# Patient Record
Sex: FEMALE | Race: WHITE | HISPANIC OR LATINO | Employment: OTHER | URBAN - METROPOLITAN AREA
[De-identification: names, ages, dates, MRNs, and addresses within clinical notes are randomized per-mention and may not be internally consistent; named-entity substitution may affect disease eponyms.]

---

## 2017-02-03 ENCOUNTER — HISTORICAL (OUTPATIENT)
Dept: ADMINISTRATIVE | Facility: HOSPITAL | Age: 47
End: 2017-02-03

## 2017-05-02 VITALS
WEIGHT: 176 LBS | BODY MASS INDEX: 26.67 KG/M2 | SYSTOLIC BLOOD PRESSURE: 112 MMHG | HEIGHT: 68 IN | DIASTOLIC BLOOD PRESSURE: 70 MMHG | HEART RATE: 87 BPM

## 2017-05-02 RX ORDER — LEVOTHYROXINE SODIUM 100 UG/1
100 CAPSULE ORAL DAILY
COMMUNITY

## 2017-05-02 RX ORDER — MORPHINE SULFATE 15 MG/1
15 TABLET ORAL EVERY 4 HOURS PRN
COMMUNITY
End: 2018-10-30 | Stop reason: ALTCHOICE

## 2017-05-02 RX ORDER — FENOFIBRATE 160 MG/1
160 TABLET ORAL DAILY
COMMUNITY
End: 2018-05-22

## 2017-05-02 RX ORDER — MELOXICAM 7.5 MG/1
7.5 TABLET ORAL 2 TIMES DAILY
COMMUNITY
End: 2023-08-23

## 2017-05-02 RX ORDER — BACLOFEN 10 MG/1
10 TABLET ORAL 2 TIMES DAILY
COMMUNITY
End: 2019-05-07 | Stop reason: DRUGHIGH

## 2017-05-02 RX ORDER — CLONAZEPAM 1 MG/1
1 TABLET ORAL DAILY
COMMUNITY
End: 2019-04-02

## 2017-05-02 RX ORDER — QUETIAPINE FUMARATE 50 MG/1
100 TABLET, FILM COATED ORAL 2 TIMES DAILY
COMMUNITY
End: 2019-05-07 | Stop reason: DRUGHIGH

## 2017-05-12 PROBLEM — R73.03 BORDERLINE DIABETES MELLITUS: Status: ACTIVE | Noted: 2017-05-12

## 2017-05-12 PROBLEM — G89.4 CHRONIC PAIN ASSOCIATED WITH SIGNIFICANT PSYCHOSOCIAL DYSFUNCTION: Status: ACTIVE | Noted: 2017-05-12

## 2017-05-12 PROBLEM — J44.9 CHRONIC OBSTRUCTIVE PULMONARY DISEASE: Status: ACTIVE | Noted: 2017-05-12

## 2017-05-12 PROBLEM — Z98.890 HISTORY OF MAJOR ABDOMINAL SURGERY: Status: ACTIVE | Noted: 2017-05-12

## 2017-05-12 PROBLEM — M81.6 LOCALIZED OSTEOPOROSIS WITHOUT CURRENT PATHOLOGICAL FRACTURE: Status: ACTIVE | Noted: 2017-05-12

## 2017-05-12 PROBLEM — M53.9 DORSOPATHY: Status: ACTIVE | Noted: 2017-05-12

## 2017-05-12 PROBLEM — Z90.81 HISTORY OF SPLENECTOMY: Status: ACTIVE | Noted: 2017-05-12

## 2017-05-12 PROBLEM — J18.9 RECURRENT PNEUMONIA: Status: ACTIVE | Noted: 2017-05-12

## 2017-05-17 ENCOUNTER — OFFICE VISIT (OUTPATIENT)
Dept: PULMONOLOGY | Facility: CLINIC | Age: 47
End: 2017-05-17
Payer: MEDICAID

## 2017-05-17 VITALS
SYSTOLIC BLOOD PRESSURE: 118 MMHG | BODY MASS INDEX: 26.22 KG/M2 | OXYGEN SATURATION: 98 % | WEIGHT: 173 LBS | HEIGHT: 68 IN | HEART RATE: 112 BPM | DIASTOLIC BLOOD PRESSURE: 80 MMHG

## 2017-05-17 DIAGNOSIS — J43.9 PULMONARY EMPHYSEMA, UNSPECIFIED EMPHYSEMA TYPE: Primary | ICD-10-CM

## 2017-05-17 DIAGNOSIS — Z87.891 PERSONAL HISTORY OF TOBACCO USE, PRESENTING HAZARDS TO HEALTH: ICD-10-CM

## 2017-05-17 DIAGNOSIS — J18.9 RECURRENT PNEUMONIA: ICD-10-CM

## 2017-05-17 PROCEDURE — 99214 OFFICE O/P EST MOD 30 MIN: CPT | Mod: 25,,, | Performed by: INTERNAL MEDICINE

## 2017-05-17 PROCEDURE — 99406 BEHAV CHNG SMOKING 3-10 MIN: CPT | Mod: ,,, | Performed by: INTERNAL MEDICINE

## 2017-05-17 RX ORDER — ALBUTEROL SULFATE 90 UG/1
2 AEROSOL, METERED RESPIRATORY (INHALATION) EVERY 6 HOURS PRN
Qty: 18 G | Refills: 5 | Status: SHIPPED | OUTPATIENT
Start: 2017-05-17 | End: 2019-11-07

## 2017-05-17 RX ORDER — HYDROCODONE BITARTRATE AND ACETAMINOPHEN 10; 325 MG/1; MG/1
TABLET ORAL
Refills: 0 | COMMUNITY
Start: 2017-04-20 | End: 2018-10-30 | Stop reason: ALTCHOICE

## 2017-05-17 RX ORDER — SUMATRIPTAN SUCCINATE 100 MG/1
TABLET ORAL
Refills: 3 | COMMUNITY
Start: 2017-04-12 | End: 2019-05-07

## 2017-05-17 NOTE — PROGRESS NOTES
"Subjective:       Patient ID: Araceli Ibarra is a 46 y.o. female.    Chief Complaint: COPD (COPD follow up)    HPI Comments: Here for follow up, overall doing much better with ANORO.  Did get some chest wall injury at a fair and has some chest discomfort as a result.  She continues to work on stopping smoking. No other new complaints    COPD    GOLD A    Last PFT - 2/10/17  FEV1- 90 % DLCO - 59 %     + LABA/LAMA    + prn ALBUTEROL    mMRC -  0 - SOB with strenuous exercise   - + 1 - SOB level ground, slight hill   -  2 - SOB walk slower or stop for breath level ground   -  3 - SOB at 100 yards or after few minutes   -  4 - SOB in house, dressing    Referral to PULMONARY REHABILITATION -   NO    Tested for alpha-1-antitrypsin - NO    Cigarette Counselling    I have counseled pt for 3-5 minutes regarding cigarette cessation.  This has included the need to stop smoking as well as strategies, including but not limited to "cold turkey", CHANTIX (including risks and benefits of kasia drug), nicotine replacement and WELLBUTRIN.      Review of Systems   Constitutional: Negative for activity change, appetite change, chills, fatigue and fever.   HENT: Positive for congestion. Negative for hearing loss, nosebleeds, postnasal drip, sneezing and sore throat.    Respiratory: Negative for apnea, cough, choking, chest tightness, shortness of breath, wheezing and stridor.    Cardiovascular: Negative for chest pain, palpitations and leg swelling.   Gastrointestinal: Negative for abdominal distention, abdominal pain, constipation, diarrhea and nausea.   Genitourinary: Negative for dysuria, frequency and urgency.   Musculoskeletal: Negative for arthralgias and myalgias.   Neurological: Negative for syncope, weakness and numbness.   Hematological: Negative for adenopathy.   Psychiatric/Behavioral: Negative for dysphoric mood. The patient is not nervous/anxious.        Objective:      Physical Exam   Constitutional: She is oriented to " person, place, and time. She appears well-developed and well-nourished. No distress.   HENT:   Head: Normocephalic and atraumatic.   Right Ear: External ear normal.   Left Ear: External ear normal.   Nose: Nose normal.   Mouth/Throat: Oropharynx is clear and moist.   Eyes: Conjunctivae and EOM are normal. Pupils are equal, round, and reactive to light.   Neck: Normal range of motion. Neck supple. No JVD present. No tracheal deviation present. No thyromegaly present.   Cardiovascular: Normal rate, regular rhythm, normal heart sounds and intact distal pulses.  Exam reveals no gallop and no friction rub.    No murmur heard.  Pulmonary/Chest: Effort normal and breath sounds normal. No stridor. No respiratory distress. She has no wheezes. She has no rales. She exhibits no tenderness.   Abdominal: Soft. Bowel sounds are normal. She exhibits no distension. There is no tenderness.   Musculoskeletal: Normal range of motion. She exhibits no edema or tenderness.   Lymphadenopathy:     She has no cervical adenopathy.   Neurological: She is alert and oriented to person, place, and time. No cranial nerve deficit.   Skin: Skin is warm and dry. She is not diaphoretic.   Psychiatric: She has a normal mood and affect. Her behavior is normal.   Nursing note and vitals reviewed.      Assessment:       1. Pulmonary emphysema, unspecified emphysema type    2. Recurrent pneumonia    3. Personal history of tobacco use, presenting hazards to health        Plan:       Problem List Items Addressed This Visit        Pulmonary    Recurrent pneumonia  - stable    Chronic obstructive pulmonary disease - Primary  - continue present medications  -RTC 6 months       Other    Personal history of tobacco use, presenting hazards to health  - d/w pt about the need to stop

## 2017-05-17 NOTE — MR AVS SNAPSHOT
[facility]  [address]  [address]  [place] [address]  Phone: [phone]                  Araceli Ibarra   2017 10:15 AM   Office Visit    Description:  Female : 1970   Provider:  Darrel Page MD   Department:  [facility]           Reason for Visit     COPD           Diagnoses this Visit        Comments    Pulmonary emphysema, unspecified emphysema type    -  Primary     Recurrent pneumonia         Personal history of tobacco use, presenting hazards to health                To Do List           Future Appointments        Provider Department Dept Phone    2017 10:00 AM Darrel Page MD [facility] [phone]      Goals (5 Years of Data)     None      Follow-Up and Disposition     Return in about 6 months (around 2017).    Follow-up and Disposition History       These Medications        Disp Refills Start End    albuterol 90 mcg/actuation inhaler 18 g 5 2017    Inhale 2 puffs into the lungs every 6 (six) hours as needed for Wheezing. Rescue - Inhalation    Pharmacy: [facility] [address] [facility] [facility] [address] AT [facility] #: [phone]              Medications           Message regarding Medications     Verify the changes and/or additions to your medication regime listed below are the same as discussed with your clinician today.  If any of these changes or additions are incorrect, please notify your healthcare provider.        START taking these NEW medications        Refills    albuterol 90 mcg/actuation inhaler 5    Sig: Inhale 2 puffs into the lungs every 6 (six) hours as needed for Wheezing. Rescue    Class: Normal    Route: Inhalation           Verify that the below list of medications is an accurate representation of the medications you are currently taking.  If none reported, the list may be blank. If incorrect, please contact your  "healthcare provider. Carry this list with you in case of emergency.           Current Medications     baclofen (LIORESAL) 10 MG tablet Take 10 mg by mouth 2 (two) times daily.    clonazePAM (KLONOPIN) 1 MG tablet Take 1 mg by mouth once daily.    fenofibrate 160 MG Tab Take 160 mg by mouth once daily.    levothyroxine (SYNTHROID) 75 MCG tablet Take 75 mcg by mouth once daily.    meloxicam (MOBIC) 15 MG tablet Take 15 mg by mouth once daily.    morphine (MSIR) 15 MG tablet Take 15 mg by mouth every 4 (four) hours as needed for Pain.    quetiapine (SEROQUEL) 50 MG tablet Take 50 mg by mouth every evening.    umeclidinium-vilanterol (ANORO ELLIPTA) 62.5-25 mcg/actuation DsDv Inhale 1 puff into the lungs once daily. Controller    albuterol 90 mcg/actuation inhaler Inhale 2 puffs into the lungs every 6 (six) hours as needed for Wheezing. Rescue    hydrocodone-acetaminophen 10-325mg (NORCO)  mg Tab     sumatriptan (IMITREX) 100 MG tablet TK 1 T PO D ONSET OF MIGRAINE PRF HEADACHES           Clinical Reference Information           Your Vitals Were     BP Pulse Height Weight SpO2 BMI    118/80 112 5' 8" (1.727 m) 78.5 kg (173 lb) 98% 26.3 kg/m2      Blood Pressure          Most Recent Value    BP  118/80      Allergies as of 5/17/2017     Cephalosporins    Penicillins    Sulfa (Sulfonamide Antibiotics)      Immunizations Administered on Date of Encounter - 5/17/2017     None      [facility] [facility] Sign UP     Activating your [employer] account is as easy as 1-2-3!     1) Visit [email or web address], select Sign Up Now, enter this activation code and your date of birth, then select Next.  [email or web address]  Expires: 7/1/2017 11:04 AM      2) Create a username and password to use when you visit [employer] in the future and select a security question in case you lose your password and select Next.    3) Enter your e-mail address and click Sign Up!    Additional Information  If you have questions, please  call [phone] to talk to " our MyChart staff. Remember, MyChart is NOT to be used for urgent needs. For medical emergencies, dial 911.

## 2017-05-23 ENCOUNTER — HOSPITAL ENCOUNTER (EMERGENCY)
Facility: HOSPITAL | Age: 47
Discharge: HOME | End: 2017-05-23
Payer: MEDICAID

## 2017-05-23 VITALS
WEIGHT: 173 LBS | RESPIRATION RATE: 16 BRPM | OXYGEN SATURATION: 99 % | HEART RATE: 78 BPM | BODY MASS INDEX: 26.3 KG/M2 | DIASTOLIC BLOOD PRESSURE: 68 MMHG | SYSTOLIC BLOOD PRESSURE: 121 MMHG | TEMPERATURE: 98 F

## 2017-05-23 DIAGNOSIS — S22.41XA CLOSED FRACTURE OF MULTIPLE RIBS OF RIGHT SIDE, INITIAL ENCOUNTER: Primary | ICD-10-CM

## 2017-05-23 DIAGNOSIS — R07.9 CHEST PAIN: ICD-10-CM

## 2017-05-23 DIAGNOSIS — R07.81 RIB PAIN ON RIGHT SIDE: ICD-10-CM

## 2017-05-23 LAB
BASOPHILS # BLD AUTO: 0.1 K/UL
BASOPHILS NFR BLD: 1.1 %
D DIMER PPP IA.FEU-MCNC: 1.32 MG/L FEU
DIFFERENTIAL METHOD: ABNORMAL
EOSINOPHIL # BLD AUTO: 0.2 K/UL
EOSINOPHIL NFR BLD: 1.9 %
ERYTHROCYTE [DISTWIDTH] IN BLOOD BY AUTOMATED COUNT: 13.9 %
HCT VFR BLD AUTO: 36.4 %
HGB BLD-MCNC: 11.9 G/DL
LYMPHOCYTES # BLD AUTO: 2.5 K/UL
LYMPHOCYTES NFR BLD: 24.8 %
MCH RBC QN AUTO: 31.7 PG
MCHC RBC AUTO-ENTMCNC: 32.8 %
MCV RBC AUTO: 97 FL
MONOCYTES # BLD AUTO: 0.6 K/UL
MONOCYTES NFR BLD: 5.8 %
NEUTROPHILS # BLD AUTO: 6.6 K/UL
NEUTROPHILS NFR BLD: 66.4 %
PLATELET # BLD AUTO: 492 K/UL
PMV BLD AUTO: 7.7 FL
RBC # BLD AUTO: 3.77 M/UL
TROPONIN I SERPL DL<=0.01 NG/ML-MCNC: 0.01 NG/ML
WBC # BLD AUTO: 10 K/UL

## 2017-05-23 PROCEDURE — 99284 EMERGENCY DEPT VISIT MOD MDM: CPT

## 2017-05-23 PROCEDURE — 36415 COLL VENOUS BLD VENIPUNCTURE: CPT

## 2017-05-23 PROCEDURE — 84484 ASSAY OF TROPONIN QUANT: CPT

## 2017-05-23 PROCEDURE — 25500020 PHARM REV CODE 255

## 2017-05-23 PROCEDURE — 85025 COMPLETE CBC W/AUTO DIFF WBC: CPT

## 2017-05-23 PROCEDURE — 85379 FIBRIN DEGRADATION QUANT: CPT

## 2017-05-23 PROCEDURE — 93005 ELECTROCARDIOGRAM TRACING: CPT

## 2017-05-23 RX ADMIN — IOHEXOL 100 ML: 350 INJECTION, SOLUTION INTRAVENOUS at 02:05
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
## 2017-06-05 ENCOUNTER — TELEPHONE (OUTPATIENT)
Dept: PULMONOLOGY | Facility: CLINIC | Age: 47
End: 2017-06-05

## 2017-06-07 ENCOUNTER — TELEPHONE (OUTPATIENT)
Dept: PULMONOLOGY | Facility: CLINIC | Age: 47
End: 2017-06-07

## 2017-06-07 NOTE — TELEPHONE ENCOUNTER
Allergies: PCN, Sulfa, Cephalosporins...asked pt what she CAN take, states unsure just knows zpack doesn't help and couldn't tolerate levaquin

## 2017-10-26 ENCOUNTER — TELEPHONE (OUTPATIENT)
Dept: PULMONOLOGY | Facility: CLINIC | Age: 47
End: 2017-10-26

## 2017-10-26 RX ORDER — PREDNISONE 10 MG/1
TABLET ORAL
Qty: 18 TABLET | Refills: 0 | Status: SHIPPED | OUTPATIENT
Start: 2017-10-26 | End: 2017-11-21

## 2017-10-26 RX ORDER — AZITHROMYCIN 250 MG/1
TABLET, FILM COATED ORAL
Qty: 6 TABLET | Refills: 0 | Status: SHIPPED | OUTPATIENT
Start: 2017-10-26 | End: 2017-11-21 | Stop reason: SDUPTHER

## 2017-10-26 NOTE — TELEPHONE ENCOUNTER
Thick mucus for few weeks, cough increasing, now lung starting to hurt, romel when laying down. Tried mucinex and delsym.

## 2017-11-07 RX ORDER — UMECLIDINIUM BROMIDE AND VILANTEROL TRIFENATATE 62.5; 25 UG/1; UG/1
POWDER RESPIRATORY (INHALATION)
Qty: 1 EACH | Refills: 6 | Status: SHIPPED | OUTPATIENT
Start: 2017-11-07 | End: 2018-04-14

## 2017-11-21 ENCOUNTER — OFFICE VISIT (OUTPATIENT)
Dept: PULMONOLOGY | Facility: CLINIC | Age: 47
End: 2017-11-21
Payer: MEDICAID

## 2017-11-21 VITALS
WEIGHT: 187 LBS | DIASTOLIC BLOOD PRESSURE: 90 MMHG | BODY MASS INDEX: 28.34 KG/M2 | SYSTOLIC BLOOD PRESSURE: 150 MMHG | OXYGEN SATURATION: 95 % | HEART RATE: 118 BPM | HEIGHT: 68 IN

## 2017-11-21 DIAGNOSIS — Z87.891 PERSONAL HISTORY OF TOBACCO USE, PRESENTING HAZARDS TO HEALTH: Primary | ICD-10-CM

## 2017-11-21 DIAGNOSIS — J43.9 PULMONARY EMPHYSEMA, UNSPECIFIED EMPHYSEMA TYPE: ICD-10-CM

## 2017-11-21 PROCEDURE — 99214 OFFICE O/P EST MOD 30 MIN: CPT | Mod: ,,, | Performed by: INTERNAL MEDICINE

## 2017-11-21 RX ORDER — AMITRIPTYLINE HYDROCHLORIDE 75 MG/1
75 TABLET ORAL NIGHTLY
COMMUNITY
End: 2021-08-23 | Stop reason: DRUGHIGH

## 2017-11-21 RX ORDER — AZITHROMYCIN 250 MG/1
TABLET, FILM COATED ORAL
Qty: 6 TABLET | Refills: 0 | Status: SHIPPED | OUTPATIENT
Start: 2017-11-21 | End: 2018-04-14 | Stop reason: SDUPTHER

## 2017-11-21 RX ORDER — PREDNISONE 10 MG/1
TABLET ORAL
Qty: 30 TABLET | Refills: 0 | Status: SHIPPED | OUTPATIENT
Start: 2017-11-21 | End: 2017-12-29 | Stop reason: SDUPTHER
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
## 2017-12-26 ENCOUNTER — TELEPHONE (OUTPATIENT)
Dept: PULMONOLOGY | Facility: CLINIC | Age: 47
End: 2017-12-26

## 2017-12-26 DIAGNOSIS — J43.9 PULMONARY EMPHYSEMA, UNSPECIFIED EMPHYSEMA TYPE: ICD-10-CM

## 2017-12-26 NOTE — TELEPHONE ENCOUNTER
Called with c/o cough congestion, productive. Said she can not leave the house due to GI issues shes had, on two new antibiotics from GI to try to clear diarrhea and vomiting issues she has had for two weeks. I explained that would be a reason we could not give any abx., she should consult PCP or GI, but would like to wait to see if Dr. flores would give steroid or soemthing. Explained his time off, but I would call when message answered.

## 2017-12-29 RX ORDER — PREDNISONE 10 MG/1
TABLET ORAL
Qty: 30 TABLET | Refills: 0 | Status: SHIPPED | OUTPATIENT
Start: 2017-12-29 | End: 2018-04-14 | Stop reason: SDUPTHER

## 2018-01-12 ENCOUNTER — TELEPHONE (OUTPATIENT)
Dept: PULMONOLOGY | Facility: CLINIC | Age: 48
End: 2018-01-12

## 2018-03-05 ENCOUNTER — TELEPHONE (OUTPATIENT)
Dept: PULMONOLOGY | Facility: CLINIC | Age: 48
End: 2018-03-05

## 2018-03-05 NOTE — TELEPHONE ENCOUNTER
Lot of mucus, taking mucinex plain bid, doesn't think infected yet but usually will progress to that. Doesn't think anoro is really helping either, asking for spiriva (mother well controlled on that). Asking for rx to dry up mucus, and new inhaler.

## 2018-04-13 ENCOUNTER — TELEPHONE (OUTPATIENT)
Dept: PULMONOLOGY | Facility: CLINIC | Age: 48
End: 2018-04-13

## 2018-04-13 DIAGNOSIS — J43.9 PULMONARY EMPHYSEMA, UNSPECIFIED EMPHYSEMA TYPE: ICD-10-CM

## 2018-04-13 NOTE — TELEPHONE ENCOUNTER
Left voicemail asking for abx/steroid for sinus infection symtoms, explained out until Monday so may want to try PCP but that I will leave message.  Also asking to change to Trelegy from Anoro, could not get back in touch to ask more details. Her next appt is 5/22

## 2018-04-14 RX ORDER — AZITHROMYCIN 250 MG/1
TABLET, FILM COATED ORAL
Qty: 6 TABLET | Refills: 0 | Status: SHIPPED | OUTPATIENT
Start: 2018-04-14 | End: 2018-05-23 | Stop reason: SDUPTHER

## 2018-04-14 RX ORDER — PREDNISONE 10 MG/1
TABLET ORAL
Qty: 30 TABLET | Refills: 0 | Status: SHIPPED | OUTPATIENT
Start: 2018-04-14 | End: 2019-02-05

## 2018-05-22 ENCOUNTER — OFFICE VISIT (OUTPATIENT)
Dept: PULMONOLOGY | Facility: CLINIC | Age: 48
End: 2018-05-22
Payer: MEDICAID

## 2018-05-22 VITALS
OXYGEN SATURATION: 96 % | WEIGHT: 188 LBS | DIASTOLIC BLOOD PRESSURE: 78 MMHG | HEIGHT: 68 IN | SYSTOLIC BLOOD PRESSURE: 122 MMHG | BODY MASS INDEX: 28.49 KG/M2 | HEART RATE: 98 BPM

## 2018-05-22 DIAGNOSIS — Z87.891 PERSONAL HISTORY OF TOBACCO USE, PRESENTING HAZARDS TO HEALTH: ICD-10-CM

## 2018-05-22 DIAGNOSIS — J43.9 PULMONARY EMPHYSEMA, UNSPECIFIED EMPHYSEMA TYPE: Primary | ICD-10-CM

## 2018-05-22 PROCEDURE — 99214 OFFICE O/P EST MOD 30 MIN: CPT | Mod: ,,, | Performed by: INTERNAL MEDICINE

## 2018-05-22 RX ORDER — SIMVASTATIN 20 MG/1
20 TABLET, FILM COATED ORAL NIGHTLY
COMMUNITY
End: 2019-07-24
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
## 2018-05-23 ENCOUNTER — TELEPHONE (OUTPATIENT)
Dept: PULMONOLOGY | Facility: CLINIC | Age: 48
End: 2018-05-23

## 2018-05-23 DIAGNOSIS — J18.9 PNEUMONIA OF RIGHT MIDDLE LOBE DUE TO INFECTIOUS ORGANISM: Primary | ICD-10-CM

## 2018-05-23 DIAGNOSIS — J43.9 PULMONARY EMPHYSEMA, UNSPECIFIED EMPHYSEMA TYPE: ICD-10-CM

## 2018-05-23 RX ORDER — LEVOFLOXACIN 750 MG/1
750 TABLET ORAL DAILY
Qty: 7 TABLET | Refills: 0 | Status: SHIPPED | OUTPATIENT
Start: 2018-05-23 | End: 2018-05-23 | Stop reason: ALTCHOICE

## 2018-05-23 RX ORDER — AZITHROMYCIN 250 MG/1
TABLET, FILM COATED ORAL
Qty: 6 TABLET | Refills: 0 | Status: SHIPPED | OUTPATIENT
Start: 2018-05-23 | End: 2018-06-19 | Stop reason: SDUPTHER

## 2018-05-23 NOTE — TELEPHONE ENCOUNTER
Can not take Levaquin, forgot to tell us, tolerates zithromax, and thinks she has taken amoxil without incident as an adult

## 2018-05-24 LAB
BASOPHILS NFR BLD: 0.1 K/UL (ref 0–0.2)
BASOPHILS NFR BLD: 0.5 %
EOSINOPHIL NFR BLD: 0.1 K/UL (ref 0–0.7)
EOSINOPHIL NFR BLD: 0.7 %
ERYTHROCYTE [DISTWIDTH] IN BLOOD BY AUTOMATED COUNT: 14.5 % (ref 11.7–14.9)
GRAN #: 7.4 K/UL (ref 1.4–6.5)
GRAN%: 61.7 %
HCT VFR BLD AUTO: 40.1 % (ref 36–48)
HGB BLD-MCNC: 13.2 G/DL (ref 12–15)
IMMATURE GRANS (ABS): 0.1 K/UL (ref 0–1)
IMMATURE GRANULOCYTES: 0.5 %
LYMPH #: 3.6 K/UL (ref 1.2–3.4)
LYMPH%: 30 %
MCH RBC QN AUTO: 32.2 PG (ref 25–35)
MCHC RBC AUTO-ENTMCNC: 32.9 G/DL (ref 31–36)
MCV RBC AUTO: 97.8 FL (ref 79–98)
MONO #: 0.8 K/UL (ref 0.1–0.6)
MONO%: 6.6 %
NUCLEATED RBCS: 0 %
PLATELET # BLD AUTO: 521 K/UL (ref 140–440)
PMV BLD AUTO: 9.7 FL (ref 8.8–12.7)
RBC # BLD AUTO: 4.1 M/UL (ref 3.5–5.5)
WBC # BLD AUTO: 11.9 K/UL (ref 5–10)

## 2018-05-26 LAB — IGE: 9 IU/ML (ref 0–100)

## 2018-05-29 LAB — IGE: 11 IU/ML (ref 0–100)

## 2018-06-07 ENCOUNTER — OFFICE VISIT (OUTPATIENT)
Dept: ALLERGY | Facility: CLINIC | Age: 48
End: 2018-06-07
Payer: MEDICAID

## 2018-06-07 VITALS
DIASTOLIC BLOOD PRESSURE: 80 MMHG | WEIGHT: 188 LBS | HEIGHT: 68 IN | SYSTOLIC BLOOD PRESSURE: 118 MMHG | BODY MASS INDEX: 28.49 KG/M2

## 2018-06-07 DIAGNOSIS — Z90.81 HISTORY OF SPLENECTOMY: ICD-10-CM

## 2018-06-07 DIAGNOSIS — Z87.891 PERSONAL HISTORY OF TOBACCO USE, PRESENTING HAZARDS TO HEALTH: ICD-10-CM

## 2018-06-07 DIAGNOSIS — J43.9 PULMONARY EMPHYSEMA, UNSPECIFIED EMPHYSEMA TYPE: ICD-10-CM

## 2018-06-07 DIAGNOSIS — R73.03 BORDERLINE DIABETES MELLITUS: ICD-10-CM

## 2018-06-07 DIAGNOSIS — J18.9 RECURRENT PNEUMONIA: Primary | ICD-10-CM

## 2018-06-07 PROCEDURE — 99204 OFFICE O/P NEW MOD 45 MIN: CPT | Mod: ,,, | Performed by: ALLERGY & IMMUNOLOGY

## 2018-06-07 NOTE — PROGRESS NOTES
"Subjective:       Patient ID: Araceli Ibarra is a 47 y.o. female.    Chief Complaint: Chest Congestion (Referral from Dr. Darrel Page for abnormal IgG, recurrent URI, pneumonia)    HPI     Pt presents as a consult from Dr. IFRAH Page for recurrent bacterial infection     Infection history:  Onset: 19 yrs old.   Pna: 2 per year since she was 19 yrs old. - recently had azithro for cap outpt . Per report   Sinusitis: none   Otitis: none   She is still having symptoms of cough and mucus production.   She does endorse emphysema as well that may contribute towards proclivity for infection.     She also had a gi bug with diarrhea in dec to march and vomited one day.- Gi doc- Dr. Ge in [place].  pt did have h pylori in feb. Diarrhea didn't stop in march.    Didn't check stool - uncertain if girardia.     Hospitalizations: none for pneumonia - but suggested by pcp.     Of note: she did have her spleen removed a few years ago.   Sx: she sweats profusely but states she is persistently hot. She doesn't "cool off"   - her primary did check her hormones. Unsure of result.   - she does supplement her thyroid.     Labs:  IgG, Total Serum              625 L                   700-1600  mg/dL         Region 6 panel: negative         Review of Systems      General: neg unexpected weight changes, fevers, chills, night sweats, malaise  HEENT: see hpi, Neg eye pain, vision changes, ear drainage, nose bleeds, throat tightness, sores in the mouth  CV: Neg chest pain, palpitations, swelling  Resp: see hpi, neg shortness of breath, hemoptysis, cough  GI: see hpi, neg dysphagia, night abdominal pain, reflux, chronic diarrhea, chronic constipation  Derm: See Hpi, neg new rash, neg flushing  Mu/sk: Neg joint pain, joint swelling   Psych: Neg anxiety  neuro: neg chronic headaches, muscle weakness  Endo: +chronic fatigue + heat intolerance and sweating neg cold     Objective:     Vitals:    06/07/18 1047   BP: 118/80   Weight: 85.3 " "kg (188 lb)   Height: 5' 8" (1.727 m)        Physical Exam      General: no acute distress, well developed well nourished   HEENT:   Head:normocephalic atraumatic  Eyes: KHANH, EOMI, Neg injection, scleral icterus, or conjunctival papillary hypertrophy.  Ears: tm clear bilaterally, normal canal  Nose:2-3+ inferior turbinates pink, neg nasal polyps            Mucosa:             Septal irritation:  OP: mucus membranes moist, - cobblestoning, - PND, neg erythema or lesions  Neck: supple, Full range of motion, neg lymphadenopathy  Chest: full respiratory excursion no abnormal chest abnormality  Resp: clear to ascultation bilaterally  CV: RRR, neg MRG, brisk capillary refill  Abdomen: BS+, non tender, non distended  Ext:  Neg clubbing, cyanosis, pitting edema  Skin: Neg rashes or lesions  Lymph: neg supraclavicular, axillary     Assessment:       1. Recurrent pneumonia    2. Personal history of tobacco use, presenting hazards to health    3. History of splenectomy    4. Pulmonary emphysema, unspecified emphysema type    5. Borderline diabetes mellitus        Plan:       Recurrent pneumonia  -     IgG; Future; Expected date: 06/07/2018  -     IgA; Future; Expected date: 06/07/2018  -     IgM; Future; Expected date: 06/07/2018  -     Pneumococcal Im (14 Serotypes); Future; Expected date: 06/07/2018    Personal history of tobacco use, presenting hazards to health    History of splenectomy    Pulmonary emphysema, unspecified emphysema type    Borderline diabetes mellitus      Pt will likely need ppv-23 q 5 years due to splenectomy.   Tobacco abuse contributing towards health comorbitdities   Dm may make it difficult for her to heal.   continue pulmonary care       Follow up in 6 weeks to review labs.         Argelia Vasquez M.D.  Allergy/Immunology  [facility]   [phone] phone  [phone] fax          "

## 2018-06-07 NOTE — LETTER
June 7, 2018        Darrel Page MD  [address]  [address]  [place] [facility] [place] [address]             [facility] - Allergy  [address]  [address]  [place] [address]  Phone: [phone]  Fax: [phone]   Patient: Araceli Ibarra   MR Number: [id number]   YOB: 1970   Date of Visit: 6/7/2018       Dear Dr. Page:    Thank you for referring Araceli Ibarra to me for evaluation. Below are the relevant portions of my assessment and plan of care.        1. Recurrent pneumonia          Recurrent pneumonia  -     IgG; Future; Expected date: 06/07/2018  -     IgA; Future; Expected date: 06/07/2018  -     IgM; Future; Expected date: 06/07/2018  -     Pneumococcal Im (14 Serotypes); Future; Expected date: 06/07/2018      Follow up in 6 weeks to review labs.       If you have questions, please do not hesitate to call me. I look forward to following Araceli along with you.    Sincerely,      Argelia Vasquez MD           CC  No Recipients

## 2018-06-12 LAB
IGA SERPL-MCNC: 172 MG/DL (ref 87–352)
IGG SERPL-MCNC: 626 MG/DL (ref 700–1600)
IGM SERPL-MCNC: 80 MG/DL (ref 26–217)
S PNEUM DA 18C IGG SER IA-MCNC: 1.8 UG/ML
S PNEUM DA 19A IGG SER IA-MCNC: 1.3 UG/ML
S PNEUM DA 6B IGG SER IA-MCNC: 3.7 UG/ML
S PNEUM DA 7F IGG SER IA-MCNC: 0.1 UG/ML
S PNEUM DA 9V IGG SER IA-MCNC: 1.4 UG/ML
STREPTOCOCCUS PNEUMONIAE 1 AB IGG: <0.1 UG/ML
STREPTOCOCCUS PNEUMONIAE 12 AB IGG: <0.1 UG/ML
STREPTOCOCCUS PNEUMONIAE 14 AB IGG: 22.5 UG/ML
STREPTOCOCCUS PNEUMONIAE 19 AB IGG: 6.1 UG/ML
STREPTOCOCCUS PNEUMONIAE 23 AB IGG: <0.1 UG/ML
STREPTOCOCCUS PNEUMONIAE 3 AB IGG: 1.2 UG/ML
STREPTOCOCCUS PNEUMONIAE 4 AB IGG: 0.1 UG/ML
STREPTOCOCCUS PNEUMONIAE 8 AB IGG: 1.4 UG/ML
STREPTOCOCCUS PNEUMONIAE 9 AB IGG: 0.2 UG/ML

## 2018-06-19 ENCOUNTER — TELEPHONE (OUTPATIENT)
Dept: PULMONOLOGY | Facility: CLINIC | Age: 48
End: 2018-06-19

## 2018-06-19 DIAGNOSIS — J43.9 PULMONARY EMPHYSEMA, UNSPECIFIED EMPHYSEMA TYPE: ICD-10-CM

## 2018-06-19 RX ORDER — AZITHROMYCIN 250 MG/1
TABLET, FILM COATED ORAL
Qty: 6 TABLET | Refills: 0 | Status: SHIPPED | OUTPATIENT
Start: 2018-06-19 | End: 2018-07-20

## 2018-06-19 NOTE — TELEPHONE ENCOUNTER
Patient called this morning and left a voicemail wanting to know if we could order something for her coughing up mucus. She stated that the mucus was colored and thick, and gives her a horrible taste in her mouth. She has tried Mucinex (per patient) but that doesn't work. Please advise.

## 2018-06-26 NOTE — PROGRESS NOTES
Will you call and tell her that her IgG is persistently low, she also has < 50% protection to the strep pneumoniae bug. Id like for her to get the pneumovax 23 vaccine and repeat titers 4 weeks afterwards. Iga and igm are normal.

## 2018-07-09 NOTE — PROGRESS NOTES
Patient notified and stated that she will call her PCP and notify them, she has an appt with them on the 19th as well as with Dr. Vasquez.

## 2018-07-19 ENCOUNTER — OFFICE VISIT (OUTPATIENT)
Dept: ALLERGY | Facility: CLINIC | Age: 48
End: 2018-07-19
Payer: MEDICAID

## 2018-07-19 VITALS
WEIGHT: 182.69 LBS | DIASTOLIC BLOOD PRESSURE: 64 MMHG | SYSTOLIC BLOOD PRESSURE: 112 MMHG | HEIGHT: 68 IN | BODY MASS INDEX: 27.69 KG/M2

## 2018-07-19 DIAGNOSIS — J18.9 RECURRENT PNEUMONIA: Primary | ICD-10-CM

## 2018-07-19 DIAGNOSIS — L74.9 SWEATING ABNORMALITY: ICD-10-CM

## 2018-07-19 DIAGNOSIS — D80.1 HYPOGAMMAGLOBULINEMIA: ICD-10-CM

## 2018-07-19 DIAGNOSIS — Z90.81 HISTORY OF SPLENECTOMY: ICD-10-CM

## 2018-07-19 DIAGNOSIS — Z87.891 PERSONAL HISTORY OF TOBACCO USE, PRESENTING HAZARDS TO HEALTH: ICD-10-CM

## 2018-07-19 PROCEDURE — 99214 OFFICE O/P EST MOD 30 MIN: CPT | Mod: ,,, | Performed by: ALLERGY & IMMUNOLOGY

## 2018-07-19 NOTE — PROGRESS NOTES
"Subjective:       Patient ID: Araceli Ibarra is a 47 y.o. female.    Chief Complaint: Immunodeficiency (recurrent bacterial infection)    HPI     Pt presents for recurrent bacterial infection   Last visit in June 2018    Infection history:  abx since last visit, none  Having rhinitis symptoms at times.   If she feels symptoms she takes otc meds and this can help.   Onset: 19 yrs old.   Pna: 2 per year since she was 19 yrs old. - recently had azithro for cap outpt . Per report   Sinusitis: none   Otitis: none   She is still having symptoms of cough and mucus production.   She does endorse emphysema as well that may contribute towards proclivity for infection.     She also had a gi bug with diarrhea in dec to march and vomited one day.- Gi doc- Dr. Ge in [place].  pt did have h pylori in feb. Diarrhea didn't stop in march.    Didn't check stool - uncertain if girardia.     Hospitalizations: none for pneumonia - but suggested by pcp.     Of note: she did have her spleen removed a few years ago.   Sx: she sweats profusely but states she is persistently hot. She doesn't "cool off"   - her primary did check her hormones. Unsure of result.   - she does supplement her thyroid.     Labs:  IgG, Total Serum              625 L                   700-1600  mg/dL         Region 6 panel: negative     Component      Latest Ref Rng & Units 6/7/2018 5/24/2018   IgE      0 - 100 IU/mL  11   Immunoglobulin G      700 - 1,600 mg/dL 626 (L)    Immunoglobulin A (IgA)      87 - 352 mg/dL 172    Immunoglobulin M      26 - 217 mg/dL 80        Component      Latest Ref Rng & Units 6/7/2018   Streptococcus pneumoniae 1 Ab IgG      >1.3 ug/mL <0.1 (L)   Streptococcus pneumoniae 3 Ab IgG      >1.3 ug/mL 1.2 (L)   Streptococcus pneumoniae 4 Ab IgG      >1.3 ug/mL 0.1 (L)   Streptococcus pneumoniae 8 Ab IgG      >1.3 ug/mL 1.4   Streptococcus pneumoniae 9 Ab IgG      >1.3 ug/mL 0.2 (L)   Streptococcus pneumoniae 12 Ab IgG      >1.3 ug/mL " "<0.1 (L)   Streptococcus pneumoniae 14 Ab IgG      >1.3 ug/mL 22.5   Streptococcus pneumoniae 19 Ab IgG      >1.3 ug/mL 6.1   Streptococcus pneumoniae 23 Ab IgG      >1.3 ug/mL <0.1 (L)   Streptococcus pneumoniae 26 Ab IgG      >1.3 ug/mL 3.7   Streptococcus pneumoniae 51 Ab IgG      >1.3 ug/mL 0.1 (L)   Streptococcus pneumoniae 56 Ab IgG      >1.3 ug/mL 1.8   Streptococcus pneumoniae 57 Ab IgG      >1.3 ug/mL 1.3 (L)   Streptococcus pneumoniae 68 Ab IgG      >1.3 ug/mL 1.4     We review her labs and discuss her hypogam and low strep pneumo titers  I instruct and give her a hand written rx for pneumovax-23 and to get repeat titers 4-6 weeks after injection.     Review of Systems      General: neg unexpected weight changes, fevers, chills, night sweats, malaise  HEENT: see hpi, Neg eye pain, vision changes, ear drainage, nose bleeds, throat tightness, sores in the mouth  CV: Neg chest pain, palpitations, swelling  Resp: see hpi, neg shortness of breath, hemoptysis, cough  GI: see hpi, neg dysphagia, night abdominal pain, reflux, chronic diarrhea, chronic constipation  Derm: See Hpi, neg new rash, neg flushing  Mu/sk: Neg joint pain, joint swelling   Psych: Neg anxiety  neuro: neg chronic headaches, muscle weakness  Endo: +chronic fatigue + heat intolerance and sweating neg cold     Objective:     Vitals:    07/19/18 1100   BP: 112/64   Weight: 82.9 kg (182 lb 11.2 oz)   Height: 5' 8" (1.727 m)        Physical Exam      General: no acute distress, well developed well nourished   HEENT:   Head:normocephalic atraumatic  Eyes: KHANH, EOMI, Neg injection, scleral icterus, or conjunctival papillary hypertrophy.  Ears: tm clear bilaterally, normal canal  Nose:2-3+ inferior turbinates pink, neg nasal polyps            Mucosa:             Septal irritation:  OP: mucus membranes moist, - cobblestoning, - PND, neg erythema or lesions  Neck: supple, Full range of motion, neg lymphadenopathy  Chest: full respiratory excursion no " abnormal chest abnormality  Resp: clear to ascultation bilaterally  CV: RRR, neg MRG, brisk capillary refill  Abdomen: BS+, non tender, non distended  Ext:  Neg clubbing, cyanosis, pitting edema  Skin: Neg rashes or lesions  Lymph: neg supraclavicular, axillary     Assessment:       1. Recurrent pneumonia    2. Hypogammaglobulinemia    3. History of splenectomy    4. Personal history of tobacco use, presenting hazards to health    5. Sweating abnormality        Plan:       Recurrent pneumonia    Hypogammaglobulinemia  -     azithromycin (ZITHROMAX) 500 MG tablet; 1 pill once per day three days per week (mon wed fri ) for immune deficiency.  Dispense: 15 tablet; Refill: 3    History of splenectomy    Personal history of tobacco use, presenting hazards to health    Sweating abnormality      Pt will need ppv-23 q 5 years due to splenectomy.   Tobacco abuse contributing towards health comorbitdities   Dm may make it difficult for her to heal.   continue pulmonary care   Discussed with patient face to face > 30 mins about her immune deficiency and treatment. Discussed after her post titers, i'd like to start her on infusions due to her recurrent infection such as pna and sinusitis. She is being pushed to file disability but wants to work. Discussed she can work on infusions or replacement therapy.     Discussed that she see neuroendocrine for her sweating and fatigue. Answered all pt questions. She agrees to start infusions.     Start proph abx until her infusions are approved.       Follow up in 6 weeks to review post titer labs and start infusions, sooner if needed.        Argelia Vasquez M.D.  Allergy/Immunology  [facility]   [phone] phone  [phone] fax

## 2018-07-19 NOTE — PATIENT INSTRUCTIONS
pneumovax 23 vaccine with Dr. Efren Yoon  [phone]    May be able to help with memory. Ask for referral.     Ask about neuroendocrine for the sweating.

## 2018-07-20 PROBLEM — L74.9 SWEATING ABNORMALITY: Status: ACTIVE | Noted: 2018-07-20

## 2018-07-20 RX ORDER — AZITHROMYCIN 500 MG/1
TABLET, FILM COATED ORAL
Qty: 15 TABLET | Refills: 3 | Status: SHIPPED | OUTPATIENT
Start: 2018-07-20 | End: 2018-08-30 | Stop reason: SDUPTHER

## 2018-07-31 ENCOUNTER — OFFICE VISIT (OUTPATIENT)
Dept: PULMONOLOGY | Facility: CLINIC | Age: 48
End: 2018-07-31
Payer: MEDICAID

## 2018-07-31 VITALS
HEART RATE: 86 BPM | DIASTOLIC BLOOD PRESSURE: 84 MMHG | WEIGHT: 183 LBS | OXYGEN SATURATION: 95 % | SYSTOLIC BLOOD PRESSURE: 120 MMHG | BODY MASS INDEX: 27.74 KG/M2 | HEIGHT: 68 IN

## 2018-07-31 DIAGNOSIS — D80.1 HYPOGAMMAGLOBULINEMIA: ICD-10-CM

## 2018-07-31 DIAGNOSIS — Z87.891 PERSONAL HISTORY OF TOBACCO USE, PRESENTING HAZARDS TO HEALTH: ICD-10-CM

## 2018-07-31 DIAGNOSIS — J43.9 PULMONARY EMPHYSEMA, UNSPECIFIED EMPHYSEMA TYPE: Primary | ICD-10-CM

## 2018-07-31 PROCEDURE — 99214 OFFICE O/P EST MOD 30 MIN: CPT | Mod: ,,, | Performed by: INTERNAL MEDICINE

## 2018-07-31 RX ORDER — ROFLUMILAST 250 UG/1
250 TABLET ORAL DAILY
Qty: 30 TABLET | Refills: 11 | Status: SHIPPED | OUTPATIENT
Start: 2018-07-31 | End: 2018-10-24 | Stop reason: ALTCHOICE

## 2018-07-31 NOTE — PROGRESS NOTES
"Subjective:       Patient ID: Araceli Ibarra is a 47 y.o. female.    Chief Complaint: COPD (follow up from 5/23/18)    5/17/2017 - Here for follow up, overall doing much better with ANORO.  Did get some chest wall injury at a fair and has some chest discomfort as a result.  She continues to work on stopping smoking. No other new complaints    1/21/2017 - Here for follow up, still smoking (lots of smokers in the house).  She is currently using cigarettes and a VAPE.  Has dyspnea (primarily with exertion), has daily cough (mucus currently brown - her usual).  She does feel that her breathing is worse over the last month or so.  Taking medications regularly.  Working on stopping smoking.    5/22/2018 - Here for follow up has been on TRELEGY for 4-6 months, still with problems with cough, congestion which has not been much better.  Still smoking (d/w her)., still using VAPE.  Mother has had similar problems.  Has had elevated WBC and is going to see hematologist.  Denies much nasal congestion, sputum is described as a "caramel" color.  No recent CXR.    7/31/2018 - Here for follow up, feels ok for now, has chronic cough but not worse.  Has chronic dyspnea (not worse).  Still smoking about 1/2 PPD.  Reviewed CXR, CT  And sinus films with pt.  No other new issues.    COPD    GOLD A    Last PFT - 2/10/17  FEV1- 90 % DLCO - 59 %     + LABA/LAMA    + prn ALBUTEROL    mMRC -  0 - SOB with strenuous exercise   - + 1 - SOB level ground, slight hill   -  2 - SOB walk slower or stop for breath level ground   -  3 - SOB at 100 yards or after few minutes   -  4 - SOB in house, dressing    Referral to PULMONARY REHABILITATION -   NO    Tested for alpha-1-antitrypsin - NO    Cigarette Counseling    Currently smoking 1/2 packs per day  30 pack years    I have counseled pt for 3-5 minutes regarding cigarette cessation.  This has included the need to stop smoking as well as strategies, including but not limited to "cold turkey", " CHANTIX (including risks and benefits of kasia drug), nicotine replacement and WELLBUTRIN.        COPD   Associated symptoms include coughing. Pertinent negatives include no abdominal pain, arthralgias, chest pain, chills, congestion, fatigue, fever, myalgias, nausea, numbness, sore throat or weakness.     Review of Systems   Constitutional: Negative for activity change, appetite change, chills, fatigue and fever.   HENT: Negative for congestion, hearing loss, nosebleeds, postnasal drip, sneezing and sore throat.    Respiratory: Positive for cough and shortness of breath. Negative for apnea, choking, chest tightness, wheezing and stridor.    Cardiovascular: Negative for chest pain, palpitations and leg swelling.   Gastrointestinal: Negative for abdominal distention, abdominal pain, constipation, diarrhea and nausea.   Genitourinary: Negative for dysuria, frequency and urgency.   Musculoskeletal: Negative for arthralgias and myalgias.   Neurological: Negative for syncope, weakness and numbness.   Hematological: Negative for adenopathy.   Psychiatric/Behavioral: Negative for dysphoric mood. The patient is nervous/anxious.        Objective:      Physical Exam   Constitutional: She is oriented to person, place, and time. She appears well-developed and well-nourished. No distress.   HENT:   Head: Normocephalic and atraumatic.   Nose: Nose normal.   Mouth/Throat: Oropharynx is clear and moist.   Eyes: Conjunctivae and EOM are normal. Pupils are equal, round, and reactive to light.   Neck: Normal range of motion. Neck supple. No JVD present. No tracheal deviation present. No thyromegaly present.   Cardiovascular: Normal rate, regular rhythm, normal heart sounds and intact distal pulses.  Exam reveals no gallop and no friction rub.    No murmur heard.  Pulmonary/Chest: Effort normal and breath sounds normal. No stridor. No respiratory distress. She has no wheezes. She has no rales. She exhibits no tenderness.   Abdominal:  Soft. Bowel sounds are normal. She exhibits no distension. There is no tenderness.   Musculoskeletal: Normal range of motion. She exhibits no edema or tenderness.   Lymphadenopathy:     She has no cervical adenopathy.   Neurological: She is alert and oriented to person, place, and time. No cranial nerve deficit.   Skin: Skin is warm and dry. She is not diaphoretic.   Psychiatric: She has a normal mood and affect. Her behavior is normal.   Nursing note and vitals reviewed.      Assessment:       No diagnosis found.    Plan:       Problem List Items Addressed This Visit        Pulmonary    Recurrent pneumonia  - resolved      Chronic obstructive pulmonary disease - Primary  - continue present medications  - add daliresp (d/w pt) 250 mg/day - call me in one month - may need to increase dose  - reviewed all studies with pt  - needs to stop smoking  - RTC 3 months         Other    Personal history of tobacco use, presenting hazards to health  - d/w pt about the need to stop

## 2018-08-30 ENCOUNTER — OFFICE VISIT (OUTPATIENT)
Dept: ALLERGY | Facility: CLINIC | Age: 48
End: 2018-08-30
Payer: MEDICAID

## 2018-08-30 VITALS
SYSTOLIC BLOOD PRESSURE: 110 MMHG | DIASTOLIC BLOOD PRESSURE: 68 MMHG | BODY MASS INDEX: 27.13 KG/M2 | WEIGHT: 179 LBS | HEIGHT: 68 IN

## 2018-08-30 DIAGNOSIS — A49.9 RECURRENT BACTERIAL INFECTION: ICD-10-CM

## 2018-08-30 DIAGNOSIS — R05.8 PRODUCTIVE COUGH: ICD-10-CM

## 2018-08-30 DIAGNOSIS — R61 SWEATING INCREASE: ICD-10-CM

## 2018-08-30 DIAGNOSIS — R68.83 CHILLS: ICD-10-CM

## 2018-08-30 DIAGNOSIS — R68.89 HEAT INTOLERANCE: ICD-10-CM

## 2018-08-30 DIAGNOSIS — R89.4 IMMUNOLOGIC ABNORMALITY: ICD-10-CM

## 2018-08-30 DIAGNOSIS — D80.1 HYPOGAMMAGLOBULINEMIA: Primary | ICD-10-CM

## 2018-08-30 PROCEDURE — S8110 PEAK EXPIRATORY FLOW RATE (P: HCPCS | Mod: ,,, | Performed by: ALLERGY & IMMUNOLOGY

## 2018-08-30 PROCEDURE — 99213 OFFICE O/P EST LOW 20 MIN: CPT | Mod: ,,, | Performed by: ALLERGY & IMMUNOLOGY

## 2018-08-30 RX ORDER — AZITHROMYCIN 500 MG/1
TABLET, FILM COATED ORAL
Qty: 15 TABLET | Refills: 3 | Status: SHIPPED | OUTPATIENT
Start: 2018-08-30 | End: 2019-02-05

## 2018-08-30 RX ORDER — THEOPHYLLINE 300 MG/1
300 TABLET, EXTENDED RELEASE ORAL 2 TIMES DAILY
Qty: 60 TABLET | Refills: 11 | Status: SHIPPED | OUTPATIENT
Start: 2018-08-30 | End: 2018-10-24 | Stop reason: ALTCHOICE

## 2018-08-30 NOTE — PATIENT INSTRUCTIONS
Start azithromycin three days per week. 500 mg. Once per day.     Keep sinuses clear with saline.     Continue inhalers per Dr. Page.     Let's try prophylactic antibiotics for about 3 months, if getting sick prior to this, call me.     [facility]     Once labs are in will call.     Call if you are ill or sick. [phone] push [phone].

## 2018-08-30 NOTE — PROGRESS NOTES
"Subjective:       Patient ID: Araceli Ibarra is a 47 y.o. female.    Chief Complaint: Other (follow up for possible labs to see if a candidate for IGG therapy)    HPI     Pt presents for recurrent bacterial infection   Last visit in July    Currently, pt has been having chills x 1 week, fatigue, decrease in appetite.   Pt states she hasn't been on prophylactic antibiotics that were ordered at the last visit for prophylaxis.       Infection history:  abx since last visit, none  Having rhinitis symptoms at times.   If she feels symptoms she takes otc meds and this can help.   Onset: 19 yrs old.   Pna: 2 per year since she was 19 yrs old. - recently had azithro for cap outpt . Per report   Sinusitis: none   Otitis: none   She is still having symptoms of cough and mucus production. Always productive from yellow to brown.   She does endorse emphysema as well that may contribute towards proclivity for infection.     She also had a gi bug with diarrhea in dec to march and vomited one day.- Gi doc- Dr. Ge in [place].  pt did have h pylori in feb. Diarrhea didn't stop in march.    Didn't check stool - uncertain if girardia.     Hospitalizations: none for pneumonia - but suggested by pcp.     Of note: she did have her spleen removed a few years ago.   Sx: she sweats profusely but states she is persistently hot. She doesn't "cool off"   - her primary did check her hormones. Unsure of result.   - she does supplement her thyroid.   Pt is seeking endocrinologist.     Labs:  IgG, Total Serum              625 L                   700-1600  mg/dL         Region 6 panel: negative     Component      Latest Ref Rng & Units 6/7/2018 5/24/2018   IgE      0 - 100 IU/mL  11   Immunoglobulin G      700 - 1,600 mg/dL 626 (L)    Immunoglobulin A (IgA)      87 - 352 mg/dL 172    Immunoglobulin M      26 - 217 mg/dL 80        Component      Latest Ref Rng & Units 6/7/2018   Streptococcus pneumoniae 1 Ab IgG      >1.3 ug/mL <0.1 (L) " "  Streptococcus pneumoniae 3 Ab IgG      >1.3 ug/mL 1.2 (L)   Streptococcus pneumoniae 4 Ab IgG      >1.3 ug/mL 0.1 (L)   Streptococcus pneumoniae 8 Ab IgG      >1.3 ug/mL 1.4   Streptococcus pneumoniae 9 Ab IgG      >1.3 ug/mL 0.2 (L)   Streptococcus pneumoniae 12 Ab IgG      >1.3 ug/mL <0.1 (L)   Streptococcus pneumoniae 14 Ab IgG      >1.3 ug/mL 22.5   Streptococcus pneumoniae 19 Ab IgG      >1.3 ug/mL 6.1   Streptococcus pneumoniae 23 Ab IgG      >1.3 ug/mL <0.1 (L)   Streptococcus pneumoniae 26 Ab IgG      >1.3 ug/mL 3.7   Streptococcus pneumoniae 51 Ab IgG      >1.3 ug/mL 0.1 (L)   Streptococcus pneumoniae 56 Ab IgG      >1.3 ug/mL 1.8   Streptococcus pneumoniae 57 Ab IgG      >1.3 ug/mL 1.3 (L)   Streptococcus pneumoniae 68 Ab IgG      >1.3 ug/mL 1.4     We review her labs and discuss her hypogam and low strep pneumo titers  Had injection and ready for post strep labs.     Review of Systems      General: neg unexpected weight changes, fevers, chills, night sweats, malaise  HEENT: see hpi, Neg eye pain, vision changes, ear drainage, nose bleeds, throat tightness, sores in the mouth  CV: Neg chest pain, palpitations, swelling  Resp: see hpi, neg shortness of breath, hemoptysis, cough  GI: see hpi, neg dysphagia, night abdominal pain, reflux, chronic diarrhea, chronic constipation  Derm: See Hpi, neg new rash, neg flushing  Mu/sk: Neg joint pain, joint swelling   Psych: Neg anxiety  neuro: neg chronic headaches, muscle weakness  Endo: +chronic fatigue + heat intolerance and sweating neg cold     Objective:     Vitals:    08/30/18 0946   BP: 110/68   Weight: 81.2 kg (179 lb)   Height: 5' 8" (1.727 m)   PF: 250 L/min        Physical Exam      General: no acute distress, well developed well nourished   HEENT:   Head:normocephalic atraumatic  Eyes: KHANH, EOMI, Neg injection, scleral icterus, or conjunctival papillary hypertrophy.  Ears: tm clear bilaterally, normal canal  Nose:2-3+ inferior turbinates pink, neg " nasal polyps            Mucosa:             Septal irritation:  OP: mucus membranes moist, - cobblestoning, - PND, neg erythema or lesions  Neck: supple, Full range of motion, neg lymphadenopathy  Chest: full respiratory excursion no abnormal chest abnormality  Resp: clear to ascultation bilaterally  CV: RRR, neg MRG, brisk capillary refill  Abdomen: BS+, non tender, non distended  Ext:  Neg clubbing, cyanosis, pitting edema  Skin: Neg rashes or lesions  Lymph: neg supraclavicular, axillary     Assessment:       1. Hypogammaglobulinemia    2. Chills    3. Productive cough    4. Immunologic abnormality    5. Recurrent bacterial infection    6. Heat intolerance    7. Sweating increase        Plan:       Hypogammaglobulinemia  -     azithromycin (ZITHROMAX) 500 MG tablet; 1 pill once per day three days per week (mon wed fri ) for immune deficiency.  Dispense: 15 tablet; Refill: 3  -     Cancel: Pneumococcal Im (14 Serotypes); Future; Expected date: 08/30/2018  -     Pneumococcal Im (14 Serotypes); Future; Expected date: 08/30/2018    Chills    Productive cough    Immunologic abnormality    Recurrent bacterial infection    Heat intolerance    Sweating increase      Pt will need ppv-23 q 5 years due to splenectomy.   Tobacco abuse contributing towards health comorbitdities   Dm may make it difficult for her to heal.   continue pulmonary care   Discussed with patient face to face > 30 mins about her immune deficiency and treatment. Discussed after her post titers, i'd like to start her on infusions due to her recurrent infection such as pna and sinusitis. She is being pushed to file disability but wants to work. Discussed she can work on infusions or replacement therapy.     Discussed that she see neuroendocrine for her sweating and fatigue. Answered all pt questions. She agrees to start infusions.     Start proph abx until her infusions are approved.       Follow up in 6 weeks to review post titer labs and start  infusions, sooner if needed.        Argelia Vasquez M.D.  Allergy/Immunology  [facility]   [phone] phone  [phone] fax

## 2018-09-05 ENCOUNTER — TELEPHONE (OUTPATIENT)
Dept: PULMONOLOGY | Facility: CLINIC | Age: 48
End: 2018-09-05

## 2018-09-20 LAB
Lab: 0.4 AI
Lab: 0.7 AI
Lab: 0.8 AI
Lab: 1.2 AI
Lab: 1.2 AI
Lab: <0.3 AI
Lab: NORMAL AI

## 2018-09-27 ENCOUNTER — OFFICE VISIT (OUTPATIENT)
Dept: ALLERGY | Facility: CLINIC | Age: 48
End: 2018-09-27
Payer: MEDICAID

## 2018-09-27 VITALS
BODY MASS INDEX: 27.13 KG/M2 | SYSTOLIC BLOOD PRESSURE: 130 MMHG | HEIGHT: 68 IN | DIASTOLIC BLOOD PRESSURE: 80 MMHG | WEIGHT: 179 LBS

## 2018-09-27 DIAGNOSIS — J40 BRONCHITIS: ICD-10-CM

## 2018-09-27 DIAGNOSIS — Z90.81 HISTORY OF SPLENECTOMY: ICD-10-CM

## 2018-09-27 DIAGNOSIS — D80.1 HYPOGAMMAGLOBULINEMIA: Primary | ICD-10-CM

## 2018-09-27 DIAGNOSIS — D80.6 ANTI-POLYSACCHARIDE ANTIBODY DEFICIENCY: ICD-10-CM

## 2018-09-27 PROCEDURE — 99213 OFFICE O/P EST LOW 20 MIN: CPT | Mod: ,,, | Performed by: ALLERGY & IMMUNOLOGY

## 2018-09-27 RX ORDER — DOXYCYCLINE HYCLATE 100 MG
100 TABLET ORAL 2 TIMES DAILY
Qty: 20 TABLET | Refills: 0 | Status: SHIPPED | OUTPATIENT
Start: 2018-09-27 | End: 2018-10-07

## 2018-09-27 RX ORDER — DULOXETIN HYDROCHLORIDE 60 MG/1
1 CAPSULE, DELAYED RELEASE ORAL 2 TIMES DAILY
COMMUNITY
End: 2019-04-02

## 2018-09-27 NOTE — PATIENT INSTRUCTIONS
Stop azithromycin until after your doxycycline course is done.    Then once the doxycycline course is finished, go back to azithromycin three days per week.     [facility] will be the specialty pharmacy I am sending your prescription. Infusion suite is in [place].  [facility] will be the infusion product.

## 2018-09-27 NOTE — PROGRESS NOTES
"Subjective:       Patient ID: Araceli Ibarra is a 48 y.o. female.    Chief Complaint: Other (follow up, doing not so great - she was sick this past weekend)    HPI     Pt presents for recurrent bacterial infection       Currently, pt has been having chills x 1 week, fatigue, decrease in appetite.   Pt states doing better on prophylactic abx however this past weekend she felt that she has had difficulty breathing, change in her mucus, she was also having chills.       Her recent labs show SAD as she did not respond to her pneumonia vaccine.   This is in addition to her hypogam.     Infection history:  abx since last visit, none  Having rhinitis symptoms at times.   If she feels symptoms she takes otc meds and this can help.   Onset: 19 yrs old.   Pna: 2 per year since she was 19 yrs old. - recently had azithro for cap outpt . Per report   Sinusitis: none   Otitis: none   She is still having symptoms of cough and mucus production. Always productive from yellow to brown.   She does endorse emphysema as well that may contribute towards proclivity for infection.     She also had a gi bug with diarrhea in dec to march and vomited one day.- Gi doc- Dr. Ge in [place].  pt did have h pylori in feb. Diarrhea didn't stop in march.    Didn't check stool - uncertain if girardia.     Hospitalizations: none for pneumonia - but suggested by pcp.     Of note: she did have her spleen removed a few years ago.   Sx: she sweats profusely but states she is persistently hot. She doesn't "cool off"   - her primary did check her hormones. Unsure of result.   - she does supplement her thyroid.   Pt is seeking endocrinologist.     Labs:  IgG, Total Serum              625 L                   700-1600  mg/dL         Region 6 panel: negative     Component      Latest Ref Rng & Units 6/7/2018 5/24/2018   IgE      0 - 100 IU/mL  11   Immunoglobulin G      700 - 1,600 mg/dL 626 (L)    Immunoglobulin A (IgA)      87 - 352 mg/dL 172  "   Immunoglobulin M      26 - 217 mg/dL 80        Component      Latest Ref Rng & Units 6/7/2018   Streptococcus pneumoniae 1 Ab IgG      >1.3 ug/mL <0.1 (L)   Streptococcus pneumoniae 3 Ab IgG      >1.3 ug/mL 1.2 (L)   Streptococcus pneumoniae 4 Ab IgG      >1.3 ug/mL 0.1 (L)   Streptococcus pneumoniae 8 Ab IgG      >1.3 ug/mL 1.4   Streptococcus pneumoniae 9 Ab IgG      >1.3 ug/mL 0.2 (L)   Streptococcus pneumoniae 12 Ab IgG      >1.3 ug/mL <0.1 (L)   Streptococcus pneumoniae 14 Ab IgG      >1.3 ug/mL 22.5   Streptococcus pneumoniae 19 Ab IgG      >1.3 ug/mL 6.1   Streptococcus pneumoniae 23 Ab IgG      >1.3 ug/mL <0.1 (L)   Streptococcus pneumoniae 26 Ab IgG      >1.3 ug/mL 3.7   Streptococcus pneumoniae 51 Ab IgG      >1.3 ug/mL 0.1 (L)   Streptococcus pneumoniae 56 Ab IgG      >1.3 ug/mL 1.8   Streptococcus pneumoniae 57 Ab IgG      >1.3 ug/mL 1.3 (L)   Streptococcus pneumoniae 68 Ab IgG      >1.3 ug/mL 1.4     Component      Latest Ref Rng & Units 8/31/2018   PNEUMO AB TYPE 5 AVIDITY      AI CANCELED   Pneumo Ab Type 68(9V) Avidity      AI 1.2   PNEUMO AB TYPE 1 AVIDITY      AI TNP   PNEUMO AB TYPE 3 AVIDITY      AI <0.3   Pneumo Ab Type 4 Avidity      AI TNP   Pneumo Ab Type 26(6B) Avidity      AI <0.3   Pneumo Ab Type 8 Avidity      AI 0.7   Pneumo Ab Type 9(9N) Avidity      AI <0.3   Pneumo Ab Type 12(12F)Avidity      AI TNP   Pneumo Ab Type 14 Avidity      AI 0.8   Pneumo Ab Type 19(19F)Avidity      AI <0.3   Pneumo Ab Type 23(23F)Avidity      AI TNP   Pneumo Ab Type 51(7F) Avidity      AI 1.2   Pneumo Ab Type 56(18C)Avidity      AI 0.4       We review her labs and discuss her hypogam and low strep pneumo titers  She did not respond to her vaccine.     Review of Systems      General: neg unexpected weight changes, fevers, chills, night sweats, malaise  HEENT: see hpi, Neg eye pain, vision changes, ear drainage, nose bleeds, throat tightness, sores in the mouth  CV: Neg chest pain, palpitations,  "swelling  Resp: see hpi, neg shortness of breath, hemoptysis  GI: see hpi, neg dysphagia, night abdominal pain, reflux, chronic diarrhea, chronic constipation  Derm: See Hpi, neg new rash, neg flushing  Mu/sk: Neg joint pain, joint swelling   Psych: Neg anxiety  neuro: neg chronic headaches, muscle weakness  Endo: +chronic fatigue + heat intolerance and sweating neg cold     Objective:     Vitals:    09/27/18 1025   BP: 130/80   Weight: 81.2 kg (179 lb)   Height: 5' 8" (1.727 m)        Physical Exam      General: no acute distress, well developed well nourished   HEENT:   Head:normocephalic atraumatic  Eyes: KHANH, EOMI, Neg injection, scleral icterus, or conjunctival papillary hypertrophy.  Ears: tm clear bilaterally, normal canal  Nose:2-3+ inferior turbinates pink, neg nasal polyps            Mucosa: dry             Septal irritation: none   OP: mucus membranes moist, - cobblestoning, - PND, neg erythema or lesions  Neck: supple, Full range of motion, neg lymphadenopathy  Chest: full respiratory excursion no abnormal chest abnormality  Resp: clear to ascultation bilaterally  CV: RRR, neg MRG, brisk capillary refill  Abdomen: BS+, non tender, non distended  Ext:  Neg clubbing, cyanosis, pitting edema  Skin: Neg rashes or lesions   Lymph: neg supraclavicular, axillary     Assessment:       1. Hypogammaglobulinemia    2. Anti-polysaccharide antibody deficiency    3. History of splenectomy    4. Bronchitis        Plan:       Hypogammaglobulinemia    Anti-polysaccharide antibody deficiency    History of splenectomy    Bronchitis  -     doxycycline (VIBRA-TABS) 100 MG tablet; Take 1 tablet (100 mg total) by mouth 2 (two) times daily. for 10 days  Dispense: 20 tablet; Refill: 0      Start infusions, hizentra 20% 550 mg/kg/month- 11 grams per week bioscrip  Tobacco abuse contributing towards health comorbitdities   Dm may make it difficult for her to heal.   continue pulmonary care   Send rx today for infusion suite " training.     Discussed that she see neuroendocrine for her sweating and fatigue. Answered all pt questions.      continue proph abx until her infusions are approved.   Due to recent illness stop proph till after full tx course. Resume proph till infusions started.     Follow up in 3 months , sooner if needed.           Argelia Vasquez M.D.  Allergy/Immunology  [facility]   [phone] phone  [phone] fax

## 2018-10-01 ENCOUNTER — TELEPHONE (OUTPATIENT)
Dept: PULMONOLOGY | Facility: CLINIC | Age: 48
End: 2018-10-01

## 2018-10-24 ENCOUNTER — TELEPHONE (OUTPATIENT)
Dept: PULMONOLOGY | Facility: CLINIC | Age: 48
End: 2018-10-24

## 2018-10-24 RX ORDER — ROFLUMILAST 500 UG/1
500 TABLET ORAL DAILY
Qty: 30 TABLET | Refills: 11 | Status: SHIPPED | OUTPATIENT
Start: 2018-10-24 | End: 2020-05-07 | Stop reason: SDUPTHER

## 2018-10-25 ENCOUNTER — OFFICE VISIT (OUTPATIENT)
Dept: ALLERGY | Facility: CLINIC | Age: 48
End: 2018-10-25
Payer: MEDICAID

## 2018-10-25 VITALS
WEIGHT: 185 LBS | HEIGHT: 68 IN | SYSTOLIC BLOOD PRESSURE: 124 MMHG | BODY MASS INDEX: 28.04 KG/M2 | DIASTOLIC BLOOD PRESSURE: 74 MMHG

## 2018-10-25 DIAGNOSIS — D80.6 ANTI-POLYSACCHARIDE ANTIBODY DEFICIENCY: ICD-10-CM

## 2018-10-25 DIAGNOSIS — J43.9 PULMONARY EMPHYSEMA, UNSPECIFIED EMPHYSEMA TYPE: ICD-10-CM

## 2018-10-25 DIAGNOSIS — D80.1 HYPOGAMMAGLOBULINEMIA: Primary | ICD-10-CM

## 2018-10-25 DIAGNOSIS — T80.90XA INFUSION REACTION, INITIAL ENCOUNTER: ICD-10-CM

## 2018-10-25 PROCEDURE — 99213 OFFICE O/P EST LOW 20 MIN: CPT | Mod: ,,, | Performed by: ALLERGY & IMMUNOLOGY

## 2018-10-25 RX ORDER — LIDOCAINE AND PRILOCAINE 25; 25 MG/G; MG/G
CREAM TOPICAL
Qty: 30 G | Refills: 3 | Status: SHIPPED | OUTPATIENT
Start: 2018-10-25

## 2018-10-25 RX ORDER — DIPHENHYDRAMINE HCL 25 MG
25 TABLET ORAL NIGHTLY PRN
Qty: 30 TABLET | Refills: 6 | Status: SHIPPED | OUTPATIENT
Start: 2018-10-25

## 2018-10-25 NOTE — PROGRESS NOTES
"Subjective:       Patient ID: Araceli Ibarra is a 48 y.o. female.    Chief Complaint: Other (tired/weak after first infusion - discuss how she is doing with treatments)    HPI     Pt presents for recurrent bacterial infection       Her recent labs show SAD as she did not respond to her pneumonia vaccine.   This is in addition to her hypogam.     Infection history:  abx since last visit, none  She started her hizentra 20% 550 mg/kg/month, 11 grams per week.   Mainly se are fatigue.   Infuses in legs and stomach  She is using briova for infusions.     Having rhinitis symptoms at times.   If she feels symptoms she takes otc meds and this can help.   Onset: 19 yrs old.   Pna: 2 per year since she was 19 yrs old. - recently had azithro for cap outpt . Per report   Sinusitis: none   Otitis: none   She is still having symptoms of cough and mucus production. Always productive from yellow to brown.   She does endorse emphysema as well that may contribute towards proclivity for infection.     She also had a gi bug with diarrhea in dec to march and vomited one day.- Gi doc- Dr. Ge in [place].  pt did have h pylori in feb. Diarrhea didn't stop in march.    Didn't check stool - uncertain if girardia.     Hospitalizations: none for pneumonia - but suggested by pcp.     Of note: she did have her spleen removed a few years ago.   Sx: she sweats profusely but states she is persistently hot. She doesn't "cool off"   - her primary did check her hormones. Unsure of result.   - she does supplement her thyroid.   Pt is seeking endocrinologist.     Labs:  IgG, Total Serum              625 L                   700-1600  mg/dL         Region 6 panel: negative     Component      Latest Ref Rng & Units 6/7/2018 5/24/2018   IgE      0 - 100 IU/mL  11   Immunoglobulin G      700 - 1,600 mg/dL 626 (L)    Immunoglobulin A (IgA)      87 - 352 mg/dL 172    Immunoglobulin M      26 - 217 mg/dL 80        Component      Latest Ref Rng & Units " 6/7/2018   Streptococcus pneumoniae 1 Ab IgG      >1.3 ug/mL <0.1 (L)   Streptococcus pneumoniae 3 Ab IgG      >1.3 ug/mL 1.2 (L)   Streptococcus pneumoniae 4 Ab IgG      >1.3 ug/mL 0.1 (L)   Streptococcus pneumoniae 8 Ab IgG      >1.3 ug/mL 1.4   Streptococcus pneumoniae 9 Ab IgG      >1.3 ug/mL 0.2 (L)   Streptococcus pneumoniae 12 Ab IgG      >1.3 ug/mL <0.1 (L)   Streptococcus pneumoniae 14 Ab IgG      >1.3 ug/mL 22.5   Streptococcus pneumoniae 19 Ab IgG      >1.3 ug/mL 6.1   Streptococcus pneumoniae 23 Ab IgG      >1.3 ug/mL <0.1 (L)   Streptococcus pneumoniae 26 Ab IgG      >1.3 ug/mL 3.7   Streptococcus pneumoniae 51 Ab IgG      >1.3 ug/mL 0.1 (L)   Streptococcus pneumoniae 56 Ab IgG      >1.3 ug/mL 1.8   Streptococcus pneumoniae 57 Ab IgG      >1.3 ug/mL 1.3 (L)   Streptococcus pneumoniae 68 Ab IgG      >1.3 ug/mL 1.4     Component      Latest Ref Rng & Units 8/31/2018   PNEUMO AB TYPE 5 AVIDITY      AI CANCELED   Pneumo Ab Type 68(9V) Avidity      AI 1.2   PNEUMO AB TYPE 1 AVIDITY      AI TNP   PNEUMO AB TYPE 3 AVIDITY      AI <0.3   Pneumo Ab Type 4 Avidity      AI TNP   Pneumo Ab Type 26(6B) Avidity      AI <0.3   Pneumo Ab Type 8 Avidity      AI 0.7   Pneumo Ab Type 9(9N) Avidity      AI <0.3   Pneumo Ab Type 12(12F)Avidity      AI TNP   Pneumo Ab Type 14 Avidity      AI 0.8   Pneumo Ab Type 19(19F)Avidity      AI <0.3   Pneumo Ab Type 23(23F)Avidity      AI TNP   Pneumo Ab Type 51(7F) Avidity      AI 1.2   Pneumo Ab Type 56(18C)Avidity      AI 0.4       We review her labs and discuss her hypogam and low strep pneumo titers  She did not respond to her vaccine.     Review of Systems      General: neg unexpected weight changes, fevers, chills, night sweats, malaise  HEENT: see hpi, Neg eye pain, vision changes, ear drainage, nose bleeds, throat tightness, sores in the mouth  CV: Neg chest pain, palpitations, swelling  Resp: see hpi, neg shortness of breath, hemoptysis  GI: see hpi, neg dysphagia, night  "abdominal pain, reflux, chronic diarrhea, chronic constipation  Derm: See Hpi, neg new rash, neg flushing  Mu/sk: Neg joint pain, joint swelling   Psych: Neg anxiety  neuro: neg chronic headaches, muscle weakness  Endo: +chronic fatigue + heat intolerance and sweating neg cold     Objective:     Vitals:    10/25/18 1122   BP: 124/74   Weight: 83.9 kg (185 lb)   Height: 5' 8" (1.727 m)        Physical Exam      General: no acute distress, well developed well nourished   HEENT:   Head:normocephalic atraumatic  Eyes: KHANH, EOMI, Neg injection, scleral icterus, or conjunctival papillary hypertrophy.  Ears: tm clear bilaterally, normal canal  Nose:2-3+ inferior turbinates pink, neg nasal polyps            Mucosa: dry             Septal irritation: none   OP: mucus membranes moist, - cobblestoning, - PND, neg erythema or lesions  Neck: supple, Full range of motion, neg lymphadenopathy  Chest: full respiratory excursion no abnormal chest abnormality  Resp: clear to ascultation bilaterally  CV: RRR, neg MRG, brisk capillary refill  Abdomen: BS+, non tender, non distended  Ext:  Neg clubbing, cyanosis, pitting edema  Skin: Neg rashes or lesions   Lymph: neg supraclavicular, axillary     Assessment:       1. Hypogammaglobulinemia    2. Anti-polysaccharide antibody deficiency    3. Pulmonary emphysema, unspecified emphysema type    4. Infusion reaction, initial encounter        Plan:       Hypogammaglobulinemia    Anti-polysaccharide antibody deficiency    Pulmonary emphysema, unspecified emphysema type    Infusion reaction, initial encounter  -     lidocaine-prilocaine (EMLA) cream; Apply topically as needed.  Dispense: 30 g; Refill: 3  -     diphenhydrAMINE (BENADRYL ALLERGY) 25 mg tablet; Take 1 tablet (25 mg total) by mouth nightly as needed for Itching.  Dispense: 30 tablet; Refill: 6      continue infusions, hizentra 20% 550 mg/kg/month- 11 grams per week brannon.   Tobacco abuse contributing towards health " comorbitdities   Dm may make it difficult for her to heal.   continue pulmonary care   Pt to call if abx are needed and will increase dose.     Repeat IgG in April.     Follow up in 3 months, sooner if needed.           Argelia Vasquez M.D.  Allergy/Immunology  [facility]   [phone] phone  [phone] fax

## 2018-10-30 ENCOUNTER — OFFICE VISIT (OUTPATIENT)
Dept: PULMONOLOGY | Facility: CLINIC | Age: 48
End: 2018-10-30
Payer: MEDICAID

## 2018-10-30 VITALS
OXYGEN SATURATION: 97 % | SYSTOLIC BLOOD PRESSURE: 120 MMHG | HEIGHT: 68 IN | DIASTOLIC BLOOD PRESSURE: 80 MMHG | BODY MASS INDEX: 26.83 KG/M2 | HEART RATE: 108 BPM | WEIGHT: 177 LBS

## 2018-10-30 DIAGNOSIS — Z87.891 PERSONAL HISTORY OF TOBACCO USE, PRESENTING HAZARDS TO HEALTH: ICD-10-CM

## 2018-10-30 DIAGNOSIS — J43.9 PULMONARY EMPHYSEMA, UNSPECIFIED EMPHYSEMA TYPE: Primary | ICD-10-CM

## 2018-10-30 DIAGNOSIS — D80.1 HYPOGAMMAGLOBULINEMIA: ICD-10-CM

## 2018-10-30 PROCEDURE — 99214 OFFICE O/P EST MOD 30 MIN: CPT | Mod: ,,, | Performed by: INTERNAL MEDICINE

## 2018-10-30 RX ORDER — OXYCODONE AND ACETAMINOPHEN 7.5; 325 MG/1; MG/1
TABLET ORAL
Refills: 0 | COMMUNITY
Start: 2018-10-22 | End: 2020-05-07

## 2018-10-30 RX ORDER — GABAPENTIN 800 MG/1
800 TABLET ORAL 2 TIMES DAILY
Refills: 1 | COMMUNITY
Start: 2018-10-22

## 2018-10-30 NOTE — PROGRESS NOTES
"Subjective:       Patient ID: Araceli Ibarra is a 48 y.o. female.    Chief Complaint: COPD (follow up, finally got Daliresp approved. )    5/17/2017 - Here for follow up, overall doing much better with ANORO.  Did get some chest wall injury at a fair and has some chest discomfort as a result.  She continues to work on stopping smoking. No other new complaints    1/21/2017 - Here for follow up, still smoking (lots of smokers in the house).  She is currently using cigarettes and a VAPE.  Has dyspnea (primarily with exertion), has daily cough (mucus currently brown - her usual).  She does feel that her breathing is worse over the last month or so.  Taking medications regularly.  Working on stopping smoking.    5/22/2018 - Here for follow up has been on TRELEGY for 4-6 months, still with problems with cough, congestion which has not been much better.  Still smoking (d/w her)., still using VAPE.  Mother has had similar problems.  Has had elevated WBC and is going to see hematologist.  Denies much nasal congestion, sputum is described as a "caramel" color.  No recent CXR.    7/31/2018 - Here for follow up, feels ok for now, has chronic cough but not worse.  Has chronic dyspnea (not worse).  Still smoking about 1/2 PPD.  Reviewed CXR, CT  And sinus films with pt.  No other new issues.    10/30/2018 - Here for follow up, has been getting immunoglobulin infusions.  Smoking about 6 cig/day.  Overall doing ok, no recent pneumonia, no recent ER visits or hospitalizations.  Sputum is still described as brown but not as much as usual.  Has also been approved for DALIRESP.and started about 2 weeks ago.    COPD    GOLD A    Last PFT - 2/10/17  FEV1- 90 % DLCO - 59 %     + LABA/LAMA/ICS/daliresp    + prn ALBUTEROL    mMRC -  0 - SOB with strenuous exercise   - + 1 - SOB level ground, slight hill   -  2 - SOB walk slower or stop for breath level ground   -  3 - SOB at 100 yards or after few minutes   -  4 - SOB in house, " "dressing    Referral to PULMONARY REHABILITATION -   NO    Tested for alpha-1-antitrypsin - NO    Cigarette Counseling    Currently smoking 1/2 packs per day  30 pack years    I have counseled pt for 3-5 minutes regarding cigarette cessation.  This has included the need to stop smoking as well as strategies, including but not limited to "cold turkey", CHANTIX (including risks and benefits of kasia drug), nicotine replacement and WELLBUTRIN.        COPD   Associated symptoms include coughing. Pertinent negatives include no abdominal pain, arthralgias, chest pain, chills, congestion, fatigue, fever, myalgias, nausea, numbness, sore throat or weakness.     Review of Systems   Constitutional: Negative for activity change, appetite change, chills, fatigue and fever.   HENT: Negative for congestion, hearing loss, nosebleeds, postnasal drip, sneezing and sore throat.    Respiratory: Positive for cough and shortness of breath. Negative for apnea, choking, chest tightness, wheezing and stridor.    Cardiovascular: Negative for chest pain, palpitations and leg swelling.   Gastrointestinal: Negative for abdominal distention, abdominal pain, constipation, diarrhea and nausea.   Genitourinary: Negative for dysuria, frequency and urgency.   Musculoskeletal: Negative for arthralgias and myalgias.   Neurological: Negative for syncope, weakness and numbness.   Hematological: Negative for adenopathy.   Psychiatric/Behavioral: Negative for dysphoric mood. The patient is nervous/anxious.        Objective:      Physical Exam   Constitutional: She is oriented to person, place, and time. She appears well-developed and well-nourished. No distress.   HENT:   Head: Normocephalic and atraumatic.   Nose: Nose normal.   Mouth/Throat: Oropharynx is clear and moist.   Eyes: Conjunctivae and EOM are normal. Pupils are equal, round, and reactive to light.   Neck: Normal range of motion. Neck supple. No JVD present. No tracheal deviation present. No " thyromegaly present.   Cardiovascular: Normal rate, regular rhythm, normal heart sounds and intact distal pulses. Exam reveals no gallop and no friction rub.   No murmur heard.  Pulmonary/Chest: Effort normal and breath sounds normal. No stridor. No respiratory distress. She has no wheezes. She has no rales. She exhibits no tenderness.   Abdominal: Soft. Bowel sounds are normal. She exhibits no distension. There is no tenderness.   Musculoskeletal: Normal range of motion. She exhibits no edema or tenderness.   Lymphadenopathy:     She has no cervical adenopathy.   Neurological: She is alert and oriented to person, place, and time. No cranial nerve deficit.   Skin: Skin is warm and dry. She is not diaphoretic.   Psychiatric: She has a normal mood and affect. Her behavior is normal.   Nursing note and vitals reviewed.      Assessment:       No diagnosis found.    Plan:       Problem List Items Addressed This Visit        Pulmonary    Recurrent pneumonia  - resolved      Chronic obstructive pulmonary disease - Primary  - continue present medications  - needs to stop smoking  - actually seems better on current regimen  - RTC 3 months         Other    Personal history of tobacco use, presenting hazards to health  - d/w pt about the need to stop

## 2018-11-15 ENCOUNTER — TELEPHONE (OUTPATIENT)
Dept: ALLERGY | Facility: CLINIC | Age: 48
End: 2018-11-15

## 2018-11-15 NOTE — TELEPHONE ENCOUNTER
Patient states her supplies came from the company, but the benadryl did not arrive with it.  I found the fax where it was faxed to [employer], I will call them to check on this.      Patient also states she got a jury duty summons.  She is questioning if she should try to give them a medical reason to be excused to avoid being around that many people.  She does her infusion on Friday and it wipes her out.      Please call at home if she does answer cell: [phone].

## 2018-11-19 ENCOUNTER — TELEPHONE (OUTPATIENT)
Dept: PULMONOLOGY | Facility: CLINIC | Age: 48
End: 2018-11-19

## 2018-12-21 RX ORDER — FLUTICASONE FUROATE, UMECLIDINIUM BROMIDE AND VILANTEROL TRIFENATATE 100; 62.5; 25 UG/1; UG/1; UG/1
POWDER RESPIRATORY (INHALATION)
Qty: 60 EACH | Refills: 6 | Status: SHIPPED | OUTPATIENT
Start: 2018-12-21 | End: 2019-09-13 | Stop reason: SDUPTHER

## 2019-01-03 ENCOUNTER — OFFICE VISIT (OUTPATIENT)
Dept: ALLERGY | Facility: CLINIC | Age: 49
End: 2019-01-03
Payer: MEDICAID

## 2019-01-03 VITALS
DIASTOLIC BLOOD PRESSURE: 84 MMHG | HEART RATE: 107 BPM | SYSTOLIC BLOOD PRESSURE: 118 MMHG | OXYGEN SATURATION: 99 % | HEIGHT: 68 IN | WEIGHT: 182 LBS | BODY MASS INDEX: 27.58 KG/M2

## 2019-01-03 DIAGNOSIS — D80.6 DEFICIENCY OF ANTI-PNEUMOCOCCAL POLYSACCHARIDE ANTIBODY: ICD-10-CM

## 2019-01-03 DIAGNOSIS — D80.1 HYPOGAMMAGLOBULINEMIA: Primary | ICD-10-CM

## 2019-01-03 PROCEDURE — 99213 PR OFFICE/OUTPT VISIT, EST, LEVL III, 20-29 MIN: ICD-10-PCS | Mod: ,,, | Performed by: ALLERGY & IMMUNOLOGY

## 2019-01-03 PROCEDURE — 99213 OFFICE O/P EST LOW 20 MIN: CPT | Mod: ,,, | Performed by: ALLERGY & IMMUNOLOGY

## 2019-01-03 RX ORDER — TEMAZEPAM 30 MG/1
30 CAPSULE ORAL NIGHTLY
Refills: 2 | COMMUNITY
Start: 2018-12-14 | End: 2024-01-29

## 2019-01-04 PROBLEM — D80.6 DEFICIENCY OF ANTI-PNEUMOCOCCAL POLYSACCHARIDE ANTIBODY: Status: ACTIVE | Noted: 2019-01-04

## 2019-01-04 NOTE — PROGRESS NOTES
"Subjective:       Patient ID: Araecli Ibarra is a 48 y.o. female.    Chief Complaint: Immunodeficiency    HPI     Pt presents for recurrent bacterial infection       Her recent labs show SAD as she did not respond to her pneumonia vaccine.   This is in addition to her hypogam.     Infection history:  abx since last visit, none  She started her hizentra 20% 550 mg/kg/month, 11 grams per week.   Mainly se are fatigue. Can have headaches  Infuses in legs and stomach  She is using briova for infusions.   Duration is 1 hour.   Happy that she doesn't have pneumonia.     Having rhinitis symptoms at times.   If she feels symptoms she takes otc meds and this can help.       Prior history 10/25/2018  Onset: 19 yrs old.   Pna: 2 per year since she was 19 yrs old. - recently had azithro for cap outpt . Per report   Sinusitis: none   Otitis: none   She is still having symptoms of cough and mucus production. Always productive from yellow to brown.   She does endorse emphysema as well that may contribute towards proclivity for infection.     She also had a gi bug with diarrhea in dec to march and vomited one day.- Gi doc- Dr. Ge in [place].  pt did have h pylori in feb. Diarrhea didn't stop in march.    Didn't check stool - uncertain if girardia.     Hospitalizations: none for pneumonia - but suggested by pcp.     Of note: she did have her spleen removed a few years ago.   Sx: she sweats profusely but states she is persistently hot. She doesn't "cool off"   - her primary did check her hormones. Unsure of result.   - she does supplement her thyroid.   Pt is seeking endocrinologist.     Labs:  IgG, Total Serum              625 L                   700-1600  mg/dL         Region 6 panel: negative     Component      Latest Ref Rng & Units 6/7/2018 5/24/2018   IgE      0 - 100 IU/mL  11   Immunoglobulin G      700 - 1,600 mg/dL 626 (L)    Immunoglobulin A (IgA)      87 - 352 mg/dL 172    Immunoglobulin M      26 - 217 mg/dL 80  "       Component      Latest Ref Rng & Units 6/7/2018   Streptococcus pneumoniae 1 Ab IgG      >1.3 ug/mL <0.1 (L)   Streptococcus pneumoniae 3 Ab IgG      >1.3 ug/mL 1.2 (L)   Streptococcus pneumoniae 4 Ab IgG      >1.3 ug/mL 0.1 (L)   Streptococcus pneumoniae 8 Ab IgG      >1.3 ug/mL 1.4   Streptococcus pneumoniae 9 Ab IgG      >1.3 ug/mL 0.2 (L)   Streptococcus pneumoniae 12 Ab IgG      >1.3 ug/mL <0.1 (L)   Streptococcus pneumoniae 14 Ab IgG      >1.3 ug/mL 22.5   Streptococcus pneumoniae 19 Ab IgG      >1.3 ug/mL 6.1   Streptococcus pneumoniae 23 Ab IgG      >1.3 ug/mL <0.1 (L)   Streptococcus pneumoniae 26 Ab IgG      >1.3 ug/mL 3.7   Streptococcus pneumoniae 51 Ab IgG      >1.3 ug/mL 0.1 (L)   Streptococcus pneumoniae 56 Ab IgG      >1.3 ug/mL 1.8   Streptococcus pneumoniae 57 Ab IgG      >1.3 ug/mL 1.3 (L)   Streptococcus pneumoniae 68 Ab IgG      >1.3 ug/mL 1.4     Component      Latest Ref Rng & Units 8/31/2018   PNEUMO AB TYPE 5 AVIDITY      AI CANCELED   Pneumo Ab Type 68(9V) Avidity      AI 1.2   PNEUMO AB TYPE 1 AVIDITY      AI TNP   PNEUMO AB TYPE 3 AVIDITY      AI <0.3   Pneumo Ab Type 4 Avidity      AI TNP   Pneumo Ab Type 26(6B) Avidity      AI <0.3   Pneumo Ab Type 8 Avidity      AI 0.7   Pneumo Ab Type 9(9N) Avidity      AI <0.3   Pneumo Ab Type 12(12F)Avidity      AI TNP   Pneumo Ab Type 14 Avidity      AI 0.8   Pneumo Ab Type 19(19F)Avidity      AI <0.3   Pneumo Ab Type 23(23F)Avidity      AI TNP   Pneumo Ab Type 51(7F) Avidity      AI 1.2   Pneumo Ab Type 56(18C)Avidity      AI 0.4       She did not respond to her vaccine.     Review of Systems      General: neg unexpected weight changes, fevers, chills, night sweats, malaise  HEENT: see hpi, Neg eye pain, vision changes, ear drainage, nose bleeds, throat tightness, sores in the mouth  CV: Neg chest pain, palpitations, swelling  Resp: see hpi, neg shortness of breath, hemoptysis  GI: see hpi, neg dysphagia, night abdominal pain, reflux, chronic  "diarrhea, chronic constipation  Derm: See Hpi, neg new rash, neg flushing  Mu/sk: Neg joint pain, joint swelling   Psych: Neg anxiety  neuro: neg chronic headaches, muscle weakness  Endo: +chronic fatigue + heat intolerance and sweating neg cold     Objective:     Vitals:    01/03/19 1147   BP: 118/84   Pulse: 107   SpO2: 99%   Weight: 82.6 kg (182 lb)   Height: 5' 8" (1.727 m)        Physical Exam      General: no acute distress, well developed well nourished   HEENT:   Head:normocephalic atraumatic  Eyes: KHANH, EOMI, Neg injection, scleral icterus, or conjunctival papillary hypertrophy.  Ears: tm clear bilaterally, normal canal  Nose:2-3+ inferior turbinates pink, neg nasal polyps            Mucosa: dry             Septal irritation: none   OP: mucus membranes moist, - cobblestoning, - PND, neg erythema or lesions  Neck: supple, Full range of motion, neg lymphadenopathy  Chest: full respiratory excursion no abnormal chest abnormality  Resp: clear to ascultation bilaterally  CV: RRR, neg MRG, brisk capillary refill  Abdomen: BS+, non tender, non distended  Ext:  Neg clubbing, cyanosis, pitting edema  Skin: Neg rashes or lesions   Lymph: neg supraclavicular, axillary     Assessment:       1. Hypogammaglobulinemia    2. Deficiency of anti-pneumococcal polysaccharide antibody        Plan:       Hypogammaglobulinemia    Deficiency of anti-pneumococcal polysaccharide antibody      continue infusions, hizentra 20% 550 mg/kg/month- 11 grams per week briova.   Tobacco abuse contributing towards health comorbitdities   Dm may make it difficult for her to heal.   continue pulmonary care   Pt to call if abx are needed and will increase dose.     Repeat IgG in April 2019     Follow up in 6 months, sooner if needed.           Argelia Vasquez M.D.  Allergy/Immunology  [facility]   [phone] phone  [phone] fax          "

## 2019-02-05 ENCOUNTER — OFFICE VISIT (OUTPATIENT)
Dept: PULMONOLOGY | Facility: CLINIC | Age: 49
End: 2019-02-05
Payer: MEDICAID

## 2019-02-05 VITALS
SYSTOLIC BLOOD PRESSURE: 122 MMHG | DIASTOLIC BLOOD PRESSURE: 84 MMHG | OXYGEN SATURATION: 98 % | WEIGHT: 179 LBS | HEART RATE: 94 BPM | BODY MASS INDEX: 27.13 KG/M2 | HEIGHT: 68 IN

## 2019-02-05 DIAGNOSIS — D80.1 HYPOGAMMAGLOBULINEMIA: ICD-10-CM

## 2019-02-05 DIAGNOSIS — Z87.891 PERSONAL HISTORY OF TOBACCO USE, PRESENTING HAZARDS TO HEALTH: ICD-10-CM

## 2019-02-05 DIAGNOSIS — R05.9 COUGH: ICD-10-CM

## 2019-02-05 DIAGNOSIS — J43.9 PULMONARY EMPHYSEMA, UNSPECIFIED EMPHYSEMA TYPE: Primary | ICD-10-CM

## 2019-02-05 DIAGNOSIS — J18.9 RECURRENT PNEUMONIA: ICD-10-CM

## 2019-02-05 PROCEDURE — 99214 OFFICE O/P EST MOD 30 MIN: CPT | Mod: ,,, | Performed by: INTERNAL MEDICINE

## 2019-02-05 PROCEDURE — 99214 PR OFFICE/OUTPT VISIT, EST, LEVL IV, 30-39 MIN: ICD-10-PCS | Mod: ,,, | Performed by: INTERNAL MEDICINE

## 2019-02-05 RX ORDER — METOPROLOL SUCCINATE 25 MG/1
50 TABLET, EXTENDED RELEASE ORAL 2 TIMES DAILY
Refills: 4 | COMMUNITY
Start: 2019-01-29 | End: 2023-05-11

## 2019-02-05 RX ORDER — RANOLAZINE 1000 MG/1
1000 TABLET, FILM COATED, EXTENDED RELEASE ORAL 2 TIMES DAILY
Refills: 3 | COMMUNITY
Start: 2019-01-17 | End: 2022-02-23

## 2019-02-05 NOTE — PROGRESS NOTES
"Subjective:       Patient ID: Araceli Ibarra is a 48 y.o. female.    Chief Complaint: COPD (been pneumonia free but still lot of mucus, even on daliresp)    5/17/2017 - Here for follow up, overall doing much better with ANORO.  Did get some chest wall injury at a fair and has some chest discomfort as a result.  She continues to work on stopping smoking. No other new complaints    1/21/2017 - Here for follow up, still smoking (lots of smokers in the house).  She is currently using cigarettes and a VAPE.  Has dyspnea (primarily with exertion), has daily cough (mucus currently brown - her usual).  She does feel that her breathing is worse over the last month or so.  Taking medications regularly.  Working on stopping smoking.    5/22/2018 - Here for follow up has been on TRELEGY for 4-6 months, still with problems with cough, congestion which has not been much better.  Still smoking (d/w her)., still using VAPE.  Mother has had similar problems.  Has had elevated WBC and is going to see hematologist.  Denies much nasal congestion, sputum is described as a "caramel" color.  No recent CXR.    7/31/2018 - Here for follow up, feels ok for now, has chronic cough but not worse.  Has chronic dyspnea (not worse).  Still smoking about 1/2 PPD.  Reviewed CXR, CT  And sinus films with pt.  No other new issues.    10/30/2018 - Here for follow up, has been getting immunoglobulin infusions.  Smoking about 6 cig/day.  Overall doing ok, no recent pneumonia, no recent ER visits or hospitalizations.  Sputum is still described as brown but not as much as usual.  Has also been approved for DALIRESP.and started about 2 weeks ago.    2/5/2019 - Here for follow up, still with daily mucus (white to a little green).  Breathing has been OK.  Usually sinuses are not as much of a problem.  No reflux or heartburn.  Has been on daliresp for about 4 months and on TRELEGY.  Still smoking (was as high as 1 PPD but back to < 1/2 PPD).  Getting IVIG and " "hasn't had pneumonia since.  No hemoptysis.    COPD    GOLD A    Last PFT - 2/10/17  FEV1- 90 % DLCO - 59 %     + LABA/LAMA/ICS/daliresp    + prn ALBUTEROL    mMRC -  0 - SOB with strenuous exercise   - + 1 - SOB level ground, slight hill   -  2 - SOB walk slower or stop for breath level ground   -  3 - SOB at 100 yards or after few minutes   -  4 - SOB in house, dressing    Referral to PULMONARY REHABILITATION -   NO    Tested for alpha-1-antitrypsin - NO    Cigarette Counseling    Currently smoking 1/2 packs per day  30 pack years    I have counseled pt for 3-5 minutes regarding cigarette cessation.  This has included the need to stop smoking as well as strategies, including but not limited to "cold turkey", CHANTIX (including risks and benefits of kasia drug), nicotine replacement and WELLBUTRIN.        COPD   Associated symptoms include coughing. Pertinent negatives include no abdominal pain, arthralgias, chest pain, chills, congestion, fatigue, fever, myalgias, nausea, numbness, sore throat or weakness.     Review of Systems   Constitutional: Negative for activity change, appetite change, chills, fatigue and fever.   HENT: Negative for congestion, hearing loss, nosebleeds, postnasal drip, sneezing and sore throat.    Respiratory: Positive for cough and shortness of breath. Negative for apnea, choking, chest tightness, wheezing and stridor.    Cardiovascular: Negative for chest pain, palpitations and leg swelling.   Gastrointestinal: Negative for abdominal distention, abdominal pain, constipation, diarrhea and nausea.   Genitourinary: Negative for dysuria, frequency and urgency.   Musculoskeletal: Negative for arthralgias and myalgias.   Neurological: Negative for syncope, weakness and numbness.   Hematological: Negative for adenopathy.   Psychiatric/Behavioral: Negative for dysphoric mood. The patient is nervous/anxious.        Objective:      Physical Exam   Constitutional: She is oriented to person, place, and " time. She appears well-developed and well-nourished. No distress.   HENT:   Head: Normocephalic and atraumatic.   Nose: Nose normal.   Mouth/Throat: Oropharynx is clear and moist.   Eyes: Conjunctivae and EOM are normal. Pupils are equal, round, and reactive to light.   Neck: Normal range of motion. Neck supple. No JVD present. No tracheal deviation present. No thyromegaly present.   Cardiovascular: Normal rate, regular rhythm, normal heart sounds and intact distal pulses. Exam reveals no gallop and no friction rub.   No murmur heard.  Pulmonary/Chest: Effort normal and breath sounds normal. No stridor. No respiratory distress. She has no wheezes. She has no rales. She exhibits no tenderness.   Abdominal: Soft. Bowel sounds are normal. She exhibits no distension. There is no tenderness.   Musculoskeletal: Normal range of motion. She exhibits no edema or tenderness.   Lymphadenopathy:     She has no cervical adenopathy.   Neurological: She is alert and oriented to person, place, and time. No cranial nerve deficit.   Skin: Skin is warm and dry. She is not diaphoretic.   Psychiatric: She has a normal mood and affect. Her behavior is normal.   Nursing note and vitals reviewed.      Assessment:       No diagnosis found.    Plan:       Problem List Items Addressed This Visit        Pulmonary    Recurrent pneumonia  - resolved      Chronic obstructive pulmonary disease - Primary  - continue present medications  - needs to stop smoking  - actually seems better on current regimen  - will recheck CT scan  - check sputum for AFB and routine culture  - may consider a trial of qod zithromax  - RTC 3 months         Other    Personal history of tobacco use, presenting hazards to health  - d/w pt about the need to stop          Darrel Page MD

## 2019-02-25 ENCOUNTER — TELEPHONE (OUTPATIENT)
Dept: PULMONOLOGY | Facility: CLINIC | Age: 49
End: 2019-02-25

## 2019-02-25 NOTE — TELEPHONE ENCOUNTER
Called with concerns about a letter [facility] sent warning they will not cover trelegy/daliresp in may. Told her when its time we can PA both like we've done before  *also c/o mucus getting thicker and bad taste/odor-usually indicates infection. Asking for rx

## 2019-02-26 RX ORDER — AZITHROMYCIN 250 MG/1
TABLET, FILM COATED ORAL
Qty: 6 TABLET | Refills: 0 | Status: SHIPPED | OUTPATIENT
Start: 2019-02-26 | End: 2019-04-02

## 2019-03-29 ENCOUNTER — TELEPHONE (OUTPATIENT)
Dept: ALLERGY | Facility: CLINIC | Age: 49
End: 2019-03-29

## 2019-03-29 NOTE — TELEPHONE ENCOUNTER
Patient called and left a message on the voicemail stating that she has been sick.  Call returned and she reports having pneumonia about a month ago requiring antibiotics.  She is still coughing.  Her sinuses have mucous in them.  Cough is productive.  Voice is hoarse.  She wants to know if she may need an increase in Hizentra dose.  Appt scheduled for Tuesday at 4, will look for open appointments Monday AM to see sooner.     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

## 2019-04-02 ENCOUNTER — OFFICE VISIT (OUTPATIENT)
Dept: ALLERGY | Facility: CLINIC | Age: 49
End: 2019-04-02
Payer: MEDICAID

## 2019-04-02 VITALS
SYSTOLIC BLOOD PRESSURE: 132 MMHG | HEIGHT: 68 IN | BODY MASS INDEX: 28.19 KG/M2 | WEIGHT: 186 LBS | DIASTOLIC BLOOD PRESSURE: 76 MMHG

## 2019-04-02 DIAGNOSIS — R41.3 MEMORY PROBLEM: ICD-10-CM

## 2019-04-02 DIAGNOSIS — D80.6 ANTI-POLYSACCHARIDE ANTIBODY DEFICIENCY: ICD-10-CM

## 2019-04-02 DIAGNOSIS — G43.809 OTHER MIGRAINE WITHOUT STATUS MIGRAINOSUS, NOT INTRACTABLE: ICD-10-CM

## 2019-04-02 DIAGNOSIS — D80.1 HYPOGAMMAGLOBULINEMIA: Primary | ICD-10-CM

## 2019-04-02 DIAGNOSIS — R05.8 PRODUCTIVE COUGH: ICD-10-CM

## 2019-04-02 DIAGNOSIS — J18.9 RECURRENT PNEUMONIA: ICD-10-CM

## 2019-04-02 PROCEDURE — 99214 OFFICE O/P EST MOD 30 MIN: CPT | Mod: ,,, | Performed by: ALLERGY & IMMUNOLOGY

## 2019-04-02 PROCEDURE — 99214 PR OFFICE/OUTPT VISIT, EST, LEVL IV, 30-39 MIN: ICD-10-PCS | Mod: ,,, | Performed by: ALLERGY & IMMUNOLOGY

## 2019-04-02 NOTE — PROGRESS NOTES
Subjective:       Patient ID: Araceli Ibarra is a 48 y.o. female.    Chief Complaint: Hypogammaglobulinemia (SAD with infection - has had pneumonia this month - possibly need to increase hizentra)    HPI     Pt presents for recurrent bacterial infection   Her recent labs show SAD as she did not respond to her pneumonia vaccine.   This is in addition to her hypogam.     This past 4 weeks , she has been coughing up brown sputum daily.   Cough is worst at night than in the morning.   Pt states she was dx with pna in the past few weeks  Has had fever 101-102.  Prior was well until the past 4 weeks.   Chest ct showed emphysematous changes. 2/2019  She was given z pack for her pneumonia.     Infection history:  abx since last visit: zpack for pna per report.   She started her hizentra 20% 550 mg/kg/month, 11 grams per week.   Mainly se are fatigue. Can have headaches  Infuses in legs and stomach  She is using [facility]ova for infusions as specialty pharmacy.   Duration is 1 hour.     Having rhinitis symptoms at times.   If she feels symptoms she takes otc meds and this can help.       Prior history 10/25/2018  Onset: 19 yrs old.   Pna: 2 per year since she was 19 yrs old. - recently had azithro for cap outpt . Per report   Sinusitis: none   Otitis: none   She is still having symptoms of cough and mucus production. Always productive from yellow to brown.   She does endorse emphysema as well that may contribute towards proclivity for infection.     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 Has had recent ppv 23.     Labs:  IgG, Total Serum              625 L                   700-1600  mg/dL         Region 6 panel: negative     Component      Latest Ref Rng & Units 6/7/2018 5/24/2018   IgE      0 - 100 IU/mL  11   Immunoglobulin G      700 - 1,600 mg/dL 626 (L)    Immunoglobulin A (IgA)      87 - 352 mg/dL 172    Immunoglobulin M      26 - 217 mg/dL 80         Component      Latest Ref Rng & Units 6/7/2018   Streptococcus pneumoniae 1 Ab IgG      >1.3 ug/mL <0.1 (L)   Streptococcus pneumoniae 3 Ab IgG      >1.3 ug/mL 1.2 (L)   Streptococcus pneumoniae 4 Ab IgG      >1.3 ug/mL 0.1 (L)   Streptococcus pneumoniae 8 Ab IgG      >1.3 ug/mL 1.4   Streptococcus pneumoniae 9 Ab IgG      >1.3 ug/mL 0.2 (L)   Streptococcus pneumoniae 12 Ab IgG      >1.3 ug/mL <0.1 (L)   Streptococcus pneumoniae 14 Ab IgG      >1.3 ug/mL 22.5   Streptococcus pneumoniae 19 Ab IgG      >1.3 ug/mL 6.1   Streptococcus pneumoniae 23 Ab IgG      >1.3 ug/mL <0.1 (L)   Streptococcus pneumoniae 26 Ab IgG      >1.3 ug/mL 3.7   Streptococcus pneumoniae 51 Ab IgG      >1.3 ug/mL 0.1 (L)   Streptococcus pneumoniae 56 Ab IgG      >1.3 ug/mL 1.8   Streptococcus pneumoniae 57 Ab IgG      >1.3 ug/mL 1.3 (L)   Streptococcus pneumoniae 68 Ab IgG      >1.3 ug/mL 1.4     Component      Latest Ref Rng & Units 8/31/2018   PNEUMO AB TYPE 5 AVIDITY      AI CANCELED   Pneumo Ab Type 68(9V) Avidity      AI 1.2   PNEUMO AB TYPE 1 AVIDITY      AI TNP   PNEUMO AB TYPE 3 AVIDITY      AI <0.3   Pneumo Ab Type 4 Avidity      AI TNP   Pneumo Ab Type 26(6B) Avidity      AI <0.3   Pneumo Ab Type 8 Avidity      AI 0.7   Pneumo Ab Type 9(9N) Avidity      AI <0.3   Pneumo Ab Type 12(12F)Avidity      AI TNP   Pneumo Ab Type 14 Avidity      AI 0.8   Pneumo Ab Type 19(19F)Avidity      AI <0.3   Pneumo Ab Type 23(23F)Avidity      AI TNP   Pneumo Ab Type 51(7F) Avidity      AI 1.2   Pneumo Ab Type 56(18C)Avidity      AI 0.4       She did not respond to her vaccine.     Review of Systems      General: neg unexpected weight changes, fevers, chills, night sweats, malaise  HEENT: see hpi, Neg eye pain, vision changes, ear drainage, nose bleeds, throat tightness, sores in the mouth  CV: Neg chest pain, palpitations, swelling  Resp: see hpi, neg shortness of breath, hemoptysis  GI: see hpi, neg dysphagia, night abdominal pain, reflux, chronic  "diarrhea, chronic constipation  Derm: See Hpi, neg new rash, neg flushing  Mu/sk: Neg joint pain, joint swelling   Psych: Neg anxiety  neuro: neg chronic headaches, muscle weakness  Endo: +chronic fatigue + heat intolerance and sweating neg cold     Objective:     Vitals:    04/02/19 1332   BP: 132/76   Weight: 84.4 kg (186 lb)   Height: 5' 8" (1.727 m)   PF: 360 L/min        Physical Exam      General: no acute distress, well developed well nourished   HEENT:   Head:normocephalic atraumatic  Eyes: KHANH, EOMI, Neg injection, scleral icterus, or conjunctival papillary hypertrophy.  Ears: tm clear bilaterally, normal canal  Nose:2-3+ inferior turbinates pink, neg nasal polyps            Mucosa: dry             Septal irritation: none   OP: mucus membranes moist, - cobblestoning, - PND, neg erythema or lesions  Neck: supple, Full range of motion, neg lymphadenopathy  Chest: full respiratory excursion no abnormal chest abnormality  Resp: clear to ascultation bilaterally- coarseness   CV: RRR, neg MRG, brisk capillary refill  Abdomen: BS+, non tender, non distended  Ext:  Neg clubbing, cyanosis, pitting edema  Skin: Neg rashes or lesions   Lymph: neg supraclavicular, axillary     Assessment:       1. Hypogammaglobulinemia    2. Anti-polysaccharide antibody deficiency    3. Recurrent pneumonia    4. Productive cough    5. Other migraine without status migrainosus, not intractable    6. Memory problem        Plan:       Hypogammaglobulinemia  -     IgG; Future; Expected date: 04/02/2019  -     immun glob G,IgG,-pro-IgA 0-50 (HIZENTRA) 4 gram/20 mL (20 %) Soln; Infuse 13 grams weekly  Dispense: 320 mL; Refill: 11    Anti-polysaccharide antibody deficiency  -     immun glob G,IgG,-pro-IgA 0-50 (HIZENTRA) 4 gram/20 mL (20 %) Soln; Infuse 13 grams weekly  Dispense: 320 mL; Refill: 11    Recurrent pneumonia    Productive cough  -     IgE; Future; Expected date: 04/02/2019  -     Aspergillus fumagatus IgE; Future; Expected " date: 04/02/2019  -     IgE Bipolaris; Future; Expected date: 04/02/2019  -     Curvularia lunata IgE; Future; Expected date: 04/02/2019    Other migraine without status migrainosus, not intractable  -     Ambulatory Referral to Neurology    Memory problem  -     Ambulatory Referral to Neurology      continue infusions, hizentra 20% increase dose 600 mg/kg/month- 13 grams per week briova. From 11 grams.   Tobacco abuse contributing towards health comorbitdities   Dm may make it difficult for her to heal.   continue pulmonary care with Dr. Page.    Repeat IgG today, IgE to see if there is any potential abpa- rust colored brown sputum. Emphasematous changes on ct.       Pt states she has migraine issues and memory problems and would like to see neurology.     Follow up in 6 weeks, sooner if needed.           Argelia Vasquez M.D.  Allergy/Immunology  [facility]   [phone] phone  [phone] fax

## 2019-04-02 NOTE — PATIENT INSTRUCTIONS
Labs today non fasting or tomorrow.     Increase dose to 13 grams weekly when new shipment arrives.     Continue respiratory care per Dr. Page.     Referral for neurology placed for migraine and memory.

## 2019-05-07 ENCOUNTER — OFFICE VISIT (OUTPATIENT)
Dept: PULMONOLOGY | Facility: CLINIC | Age: 49
End: 2019-05-07
Payer: MEDICAID

## 2019-05-07 ENCOUNTER — OFFICE VISIT (OUTPATIENT)
Dept: ALLERGY | Facility: CLINIC | Age: 49
End: 2019-05-07
Payer: MEDICAID

## 2019-05-07 VITALS
WEIGHT: 184 LBS | HEART RATE: 84 BPM | SYSTOLIC BLOOD PRESSURE: 126 MMHG | BODY MASS INDEX: 27.89 KG/M2 | OXYGEN SATURATION: 98 % | DIASTOLIC BLOOD PRESSURE: 74 MMHG | HEIGHT: 68 IN

## 2019-05-07 VITALS
DIASTOLIC BLOOD PRESSURE: 74 MMHG | WEIGHT: 184 LBS | HEIGHT: 68 IN | BODY MASS INDEX: 27.89 KG/M2 | OXYGEN SATURATION: 98 % | HEART RATE: 84 BPM | SYSTOLIC BLOOD PRESSURE: 126 MMHG

## 2019-05-07 DIAGNOSIS — R05.8 PRODUCTIVE COUGH: ICD-10-CM

## 2019-05-07 DIAGNOSIS — J43.9 PULMONARY EMPHYSEMA, UNSPECIFIED EMPHYSEMA TYPE: Primary | ICD-10-CM

## 2019-05-07 DIAGNOSIS — J18.9 RECURRENT PNEUMONIA: ICD-10-CM

## 2019-05-07 DIAGNOSIS — J43.9 PULMONARY EMPHYSEMA, UNSPECIFIED EMPHYSEMA TYPE: ICD-10-CM

## 2019-05-07 DIAGNOSIS — D80.6 ANTI-POLYSACCHARIDE ANTIBODY DEFICIENCY: ICD-10-CM

## 2019-05-07 DIAGNOSIS — J18.9 RECURRENT PNEUMONIA: Primary | ICD-10-CM

## 2019-05-07 DIAGNOSIS — Z87.891 PERSONAL HISTORY OF TOBACCO USE, PRESENTING HAZARDS TO HEALTH: ICD-10-CM

## 2019-05-07 DIAGNOSIS — R05.9 COUGH: ICD-10-CM

## 2019-05-07 DIAGNOSIS — D80.1 HYPOGAMMAGLOBULINEMIA: ICD-10-CM

## 2019-05-07 PROCEDURE — 99214 PR OFFICE/OUTPT VISIT, EST, LEVL IV, 30-39 MIN: ICD-10-PCS | Mod: ,,, | Performed by: INTERNAL MEDICINE

## 2019-05-07 PROCEDURE — 99214 OFFICE O/P EST MOD 30 MIN: CPT | Mod: ,,, | Performed by: ALLERGY & IMMUNOLOGY

## 2019-05-07 PROCEDURE — 99214 OFFICE O/P EST MOD 30 MIN: CPT | Mod: ,,, | Performed by: INTERNAL MEDICINE

## 2019-05-07 PROCEDURE — 99214 PR OFFICE/OUTPT VISIT, EST, LEVL IV, 30-39 MIN: ICD-10-PCS | Mod: ,,, | Performed by: ALLERGY & IMMUNOLOGY

## 2019-05-07 RX ORDER — MORPHINE SULFATE 15 MG/1
15 TABLET, FILM COATED, EXTENDED RELEASE ORAL 2 TIMES DAILY
COMMUNITY
Start: 2019-04-17

## 2019-05-07 RX ORDER — HYDROCODONE BITARTRATE AND ACETAMINOPHEN 10; 325 MG/1; MG/1
1 TABLET ORAL 3 TIMES DAILY
COMMUNITY
Start: 2019-04-17

## 2019-05-07 RX ORDER — QUETIAPINE FUMARATE 200 MG/1
200 TABLET, FILM COATED ORAL NIGHTLY
Refills: 0 | COMMUNITY
Start: 2019-03-20

## 2019-05-07 RX ORDER — BACLOFEN 20 MG/1
20 TABLET ORAL 3 TIMES DAILY
COMMUNITY
Start: 2019-04-17

## 2019-05-07 NOTE — PROGRESS NOTES
"Subjective:       Patient ID: Araceli Ibarra is a 48 y.o. female.    Chief Complaint: Emphysema (3 moth follow up)    5/17/2017 - Here for follow up, overall doing much better with ANORO.  Did get some chest wall injury at a fair and has some chest discomfort as a result.  She continues to work on stopping smoking. No other new complaints    1/21/2017 - Here for follow up, still smoking (lots of smokers in the house).  She is currently using cigarettes and a VAPE.  Has dyspnea (primarily with exertion), has daily cough (mucus currently brown - her usual).  She does feel that her breathing is worse over the last month or so.  Taking medications regularly.  Working on stopping smoking.    5/22/2018 - Here for follow up has been on TRELEGY for 4-6 months, still with problems with cough, congestion which has not been much better.  Still smoking (d/w her)., still using VAPE.  Mother has had similar problems.  Has had elevated WBC and is going to see hematologist.  Denies much nasal congestion, sputum is described as a "caramel" color.  No recent CXR.    7/31/2018 - Here for follow up, feels ok for now, has chronic cough but not worse.  Has chronic dyspnea (not worse).  Still smoking about 1/2 PPD.  Reviewed CXR, CT  And sinus films with pt.  No other new issues.    10/30/2018 - Here for follow up, has been getting immunoglobulin infusions.  Smoking about 6 cig/day.  Overall doing ok, no recent pneumonia, no recent ER visits or hospitalizations.  Sputum is still described as brown but not as much as usual.  Has also been approved for DALIRESP.and started about 2 weeks ago.    2/5/2019 - Here for follow up, still with daily mucus (white to a little green).  Breathing has been OK.  Usually sinuses are not as much of a problem.  No reflux or heartburn.  Has been on daliresp for about 4 months and on TRELEGY.  Still smoking (was as high as 1 PPD but back to < 1/2 PPD).  Getting IVIG and hasn't had pneumonia since.  No " "hemoptysis.    5/7/2019 - Here for follow up, overall stable on present meds (TRELEGY and DALIRESP - she has been on numerous other regimens and this has been the best at controlling her symptoms).  Pt is currently stable on present medications with no recent increases in their symptoms or use of rescue medications.  I have reviewed the medical regimen and re-educated the pt on the role of rescue and controlling medications.  All questions answered.  Inhaler technique seems adequate.  Still getting immunoglobulin infusions (dose has been increased recently).  Still smoking anywhere from (1/4 - 3/4 PPD) - lives in house with multiple smokers.  CT scan showed COPD but no acute changes.  Sputum culture and AFB were negative.    COPD    GOLD A    Last PFT - 2/10/17  FEV1- 90 % DLCO - 59 %     + LABA/LAMA/ICS/daliresp    + prn ALBUTEROL    mMRC -  0 - SOB with strenuous exercise   - + 1 - SOB level ground, slight hill   -  2 - SOB walk slower or stop for breath level ground   -  3 - SOB at 100 yards or after few minutes   -  4 - SOB in house, dressing    Referral to PULMONARY REHABILITATION -   NO    Tested for alpha-1-antitrypsin - NO    Cigarette Counseling    Currently smoking 1/2 packs per day  30 pack years    I have counseled pt for 3-5 minutes regarding cigarette cessation.  This has included the need to stop smoking as well as strategies, including but not limited to "cold turkey", CHANTIX (including risks and benefits of kasia drug), nicotine replacement and WELLBUTRIN.      COPD   Associated symptoms include coughing. Pertinent negatives include no abdominal pain, arthralgias, chest pain, chills, congestion, fatigue, fever, myalgias, nausea, numbness, sore throat or weakness.     Review of Systems   Constitutional: Negative for activity change, appetite change, chills, fatigue and fever.   HENT: Negative for congestion, hearing loss, nosebleeds, postnasal drip, sneezing and sore throat.    Respiratory: Positive " for cough and shortness of breath. Negative for apnea, choking, chest tightness, wheezing and stridor.    Cardiovascular: Negative for chest pain, palpitations and leg swelling.   Gastrointestinal: Negative for abdominal distention, abdominal pain, constipation, diarrhea and nausea.   Genitourinary: Negative for dysuria, frequency and urgency.   Musculoskeletal: Negative for arthralgias and myalgias.   Neurological: Negative for syncope, weakness and numbness.   Hematological: Negative for adenopathy.   Psychiatric/Behavioral: Negative for dysphoric mood. The patient is nervous/anxious.        Objective:      Physical Exam   Constitutional: She is oriented to person, place, and time. She appears well-developed and well-nourished. No distress.   HENT:   Head: Normocephalic and atraumatic.   Nose: Nose normal.   Mouth/Throat: Oropharynx is clear and moist.   Eyes: Pupils are equal, round, and reactive to light. Conjunctivae and EOM are normal.   Neck: Normal range of motion. Neck supple. No JVD present. No tracheal deviation present. No thyromegaly present.   Cardiovascular: Normal rate, regular rhythm, normal heart sounds and intact distal pulses. Exam reveals no gallop and no friction rub.   No murmur heard.  Pulmonary/Chest: Effort normal and breath sounds normal. No stridor. No respiratory distress. She has no wheezes. She has no rales. She exhibits no tenderness.   Abdominal: Soft. Bowel sounds are normal. She exhibits no distension. There is no tenderness.   Musculoskeletal: Normal range of motion. She exhibits no edema or tenderness.   Lymphadenopathy:     She has no cervical adenopathy.   Neurological: She is alert and oriented to person, place, and time. No cranial nerve deficit.   Skin: Skin is warm and dry. She is not diaphoretic.   Psychiatric: She has a normal mood and affect. Her behavior is normal.   Nursing note and vitals reviewed.      Assessment:       No diagnosis found.    Plan:       Problem List  Items Addressed This Visit        Pulmonary    Recurrent pneumonia  - resolved      Chronic obstructive pulmonary disease - Primary  - continue present medications  - needs to stop smoking  - actually seems better on current regimen  - RTC 6 months         Other    Personal history of tobacco use, presenting hazards to health  - d/w pt about the need to stop          Darrel Page MD

## 2019-05-07 NOTE — PROGRESS NOTES
Subjective:       Patient ID: Araceli Ibarra is a 48 y.o. female.    Chief Complaint: Hypogammaglobulinemia (increased Hizentra at last visit 4/2/19 - reports that she did not require an abx in the last 5 weeks)    HPI     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   as she did not respond to her pneumonia vaccine.   This is in addition to her hypogam.     she has been coughing up brown sputum daily. This hasn't stopped since the last visit.   No labs done as of yet to eval for ABPA.   Cough is worst at night than in the morning.   Prior was well until the past 8 weeks.   Chest ct showed emphysematous changes. 2/2019  She was given z pack for her pneumonia 2/2019.    No other infection since Feb.     She recently got her increase 13 grams weekly 5/2019.    Infection history:  abx since last visit: zpack for pna per report.   She started her hizentra 20% 550 mg/kg/month, 11 grams per week.   Mainly se are fatigue. Can have headaches.   No headaches with increase in dose.   Needs longer needles had leaking in her legs. Most recent dose.   Infuses in legs and stomach, rotates sites.   Possible 9 mm needle, discussed 12 -14 mm needle.   She is using [facility] for infusions as specialty pharmacy.   Duration is 1 hour.     Having rhinitis symptoms at times.   If she feels symptoms she takes otc meds and this can help.       Prior history 10/25/2018  Onset: 19 yrs old.   Pna: 2 per year since she was 19 yrs old. - recently had azithro for cap outpt . Per report   Sinusitis: none   Otitis: none   She is still having symptoms of cough and mucus production. Always productive from yellow to brown.   She does endorse emphysema as well that may contribute towards proclivity for infection.     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 Has had recent ppv 23.     Labs:  IgG, Total Serum              625 L                   700-1600  mg/dL          Region 6 panel: negative     Component      Latest Ref Rng & Units 6/7/2018 5/24/2018   IgE      0 - 100 IU/mL  11   Immunoglobulin G      700 - 1,600 mg/dL 626 (L)    Immunoglobulin A (IgA)      87 - 352 mg/dL 172    Immunoglobulin M      26 - 217 mg/dL 80        Component      Latest Ref Rng & Units 6/7/2018   Streptococcus pneumoniae 1 Ab IgG      >1.3 ug/mL <0.1 (L)   Streptococcus pneumoniae 3 Ab IgG      >1.3 ug/mL 1.2 (L)   Streptococcus pneumoniae 4 Ab IgG      >1.3 ug/mL 0.1 (L)   Streptococcus pneumoniae 8 Ab IgG      >1.3 ug/mL 1.4   Streptococcus pneumoniae 9 Ab IgG      >1.3 ug/mL 0.2 (L)   Streptococcus pneumoniae 12 Ab IgG      >1.3 ug/mL <0.1 (L)   Streptococcus pneumoniae 14 Ab IgG      >1.3 ug/mL 22.5   Streptococcus pneumoniae 19 Ab IgG      >1.3 ug/mL 6.1   Streptococcus pneumoniae 23 Ab IgG      >1.3 ug/mL <0.1 (L)   Streptococcus pneumoniae 26 Ab IgG      >1.3 ug/mL 3.7   Streptococcus pneumoniae 51 Ab IgG      >1.3 ug/mL 0.1 (L)   Streptococcus pneumoniae 56 Ab IgG      >1.3 ug/mL 1.8   Streptococcus pneumoniae 57 Ab IgG      >1.3 ug/mL 1.3 (L)   Streptococcus pneumoniae 68 Ab IgG      >1.3 ug/mL 1.4     Component      Latest Ref Rng & Units 8/31/2018   PNEUMO AB TYPE 5 AVIDITY      AI CANCELED   Pneumo Ab Type 68(9V) Avidity      AI 1.2   PNEUMO AB TYPE 1 AVIDITY      AI TNP   PNEUMO AB TYPE 3 AVIDITY      AI <0.3   Pneumo Ab Type 4 Avidity      AI TNP   Pneumo Ab Type 26(6B) Avidity      AI <0.3   Pneumo Ab Type 8 Avidity      AI 0.7   Pneumo Ab Type 9(9N) Avidity      AI <0.3   Pneumo Ab Type 12(12F)Avidity      AI TNP   Pneumo Ab Type 14 Avidity      AI 0.8   Pneumo Ab Type 19(19F)Avidity      AI <0.3   Pneumo Ab Type 23(23F)Avidity      AI TNP   Pneumo Ab Type 51(7F) Avidity      AI 1.2   Pneumo Ab Type 56(18C)Avidity      AI 0.4       She did not respond to her vaccine.     Review of Systems      General: neg unexpected weight changes, fevers, chills, night sweats, malaise  HEENT:  "see hpi, Neg eye pain, vision changes, ear drainage, nose bleeds, throat tightness, sores in the mouth  CV: Neg chest pain, palpitations, swelling  Resp: see hpi, neg shortness of breath, hemoptysis  GI: see hpi, neg dysphagia, night abdominal pain, reflux, chronic diarrhea, chronic constipation  Derm: See Hpi, neg new rash, neg flushing  Mu/sk: Neg joint pain, joint swelling   Psych: Neg anxiety  neuro: neg chronic headaches, muscle weakness  Endo: +chronic fatigue + heat intolerance and sweating neg cold     Objective:     Vitals:    05/07/19 1212   BP: 126/74   Pulse: 84   SpO2: 98%   Weight: 83.5 kg (184 lb)   Height: 5' 8" (1.727 m)        Physical Exam      General: no acute distress, well developed well nourished   HEENT:   Head:normocephalic atraumatic  Eyes: KHANH, EOMI, Neg injection, scleral icterus, or conjunctival papillary hypertrophy.  Ears: tm clear bilaterally, normal canal  Nose: nares patent   OP: mucus membranes moist, - cobblestoning, - PND, neg erythema or lesions  Neck: supple, Full range of motion, neg lymphadenopathy  Chest: full respiratory excursion no abnormal chest abnormality  Resp: clear to ascultation anaterior, posterior diminished and wheezing.    CV: RRR, neg MRG, brisk capillary refill  Abdomen: BS+, non tender, non distended  Ext:  Neg clubbing, cyanosis, pitting edema  Skin: Neg rashes or lesions   Lymph: neg supraclavicular, axillary     Assessment:       1. Recurrent pneumonia    2. Pulmonary emphysema, unspecified emphysema type    3. Productive cough    4. Hypogammaglobulinemia    5. Anti-polysaccharide antibody deficiency        Plan:       Recurrent pneumonia    Pulmonary emphysema, unspecified emphysema type    Productive cough    Hypogammaglobulinemia    Anti-polysaccharide antibody deficiency      continue infusions, hizentra 20% increase dose 600 mg/kg/month- 13 grams per week briova. From 11 grams. Just received 5/2019.  Discussed infusion techniques today.   Tobacco " abuse contributing towards health comorbitdities   Dm may make it difficult for her to heal.   continue pulmonary care with Dr. Page.    Repeat IgG, IgE to see if there is any potential ABPA- rust colored brown sputum. Emphasematous changes on ct.     Pt states she has migraine issues and memory problems and would like to see neurology. Has appt not seen yet.     For sweating, trying to get in to see endocrinology.     Follow up in 3 months, sooner if needed.           Argelia Vasquez M.D.  Allergy/Immunology  [facility]   [phone] phone  [phone] fax

## 2019-05-07 NOTE — PATIENT INSTRUCTIONS
Hizentra:  Try longer needle 12 mm or 14 mm needle. Try 12 mm first then try 14 mm needle with Briova.     Labs- [facility]    Follow up in 3 months

## 2019-05-09 LAB — IGG, SERUM: 1107 MG/DL (ref 700–1600)

## 2019-05-16 LAB — IGE: 6 IU/ML (ref 6–495)

## 2019-06-10 ENCOUNTER — TELEPHONE (OUTPATIENT)
Dept: ALLERGY | Facility: CLINIC | Age: 49
End: 2019-06-10

## 2019-06-10 NOTE — TELEPHONE ENCOUNTER
----- Message from Yaquelin Mullen sent at 6/6/2019 10:30 AM CDT -----  Contact: Patient  The patient called stating she had an infusion on Friday that did not help her condition and she now has Pneumonia.  She is requesting a Z-Pack and steroids sent to her pharmacy [facility]  [phone].  Please assist and thank in advance.

## 2019-06-11 DIAGNOSIS — D80.6 ANTI-POLYSACCHARIDE ANTIBODY DEFICIENCY: ICD-10-CM

## 2019-06-11 DIAGNOSIS — D80.1 HYPOGAMMAGLOBULINEMIA: Primary | ICD-10-CM

## 2019-06-11 DIAGNOSIS — R05.9 COUGH: ICD-10-CM

## 2019-06-11 NOTE — PROGRESS NOTES
Pt states she feels she has pneumonia.     Will order chest radiograph and try to confirm.

## 2019-07-24 ENCOUNTER — OFFICE VISIT (OUTPATIENT)
Dept: ALLERGY | Facility: CLINIC | Age: 49
End: 2019-07-24
Payer: MEDICAID

## 2019-07-24 VITALS
WEIGHT: 191 LBS | BODY MASS INDEX: 28.95 KG/M2 | DIASTOLIC BLOOD PRESSURE: 62 MMHG | HEIGHT: 68 IN | SYSTOLIC BLOOD PRESSURE: 126 MMHG

## 2019-07-24 DIAGNOSIS — J43.9 PULMONARY EMPHYSEMA, UNSPECIFIED EMPHYSEMA TYPE: ICD-10-CM

## 2019-07-24 DIAGNOSIS — D80.1 HYPOGAMMAGLOBULINEMIA: Primary | ICD-10-CM

## 2019-07-24 DIAGNOSIS — D80.6 ANTI-POLYSACCHARIDE ANTIBODY DEFICIENCY: ICD-10-CM

## 2019-07-24 DIAGNOSIS — R06.2 WHEEZING ON AUSCULTATION: ICD-10-CM

## 2019-07-24 DIAGNOSIS — Z87.891 PERSONAL HISTORY OF TOBACCO USE, PRESENTING HAZARDS TO HEALTH: ICD-10-CM

## 2019-07-24 PROCEDURE — 99213 OFFICE O/P EST LOW 20 MIN: CPT | Mod: ,,, | Performed by: ALLERGY & IMMUNOLOGY

## 2019-07-24 PROCEDURE — 99213 PR OFFICE/OUTPT VISIT, EST, LEVL III, 20-29 MIN: ICD-10-PCS | Mod: ,,, | Performed by: ALLERGY & IMMUNOLOGY

## 2019-07-24 RX ORDER — ROSUVASTATIN CALCIUM 20 MG/1
20 TABLET, COATED ORAL NIGHTLY
COMMUNITY
Start: 2019-07-23

## 2019-07-24 NOTE — PROGRESS NOTES
Subjective:       Patient ID: Araceli Ibarra is a 48 y.o. female.    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   as she did not respond to her pneumonia vaccine.   This is in addition to her hypogam.     Recently she fell in a parking lot [place] [employer]  She did catch a diarrhea bug.   she has been coughing up brown sputum daily. This hasn't stopped since the last visit.   eval for ABPA- negative   Cough is worst at night than in the morning.   Chest ct showed emphysematous changes. 2/2019  She was given z pack for her pneumonia 2/2019.    No other infection since Feb. 2019    She recently got her increase 13 grams weekly 5/2019 hizentra.  She is with briova.     Infection history:  abx since last visit: none since may 2019    zpack for pna per report. 2/2019   She started her hizentra 20% 13 grams per week. 600 mg/kg.   Mainly se are fatigue. Can have headaches.   No headaches with increase in dose.   Needs longer needles had leaking in her legs. Most recent dose.   Infuses in legs and stomach, rotates sites.   Possible 9 mm needle, discussed 12 -14 mm needle. Still has leakage with infusions.   She is using [facility] for infusions as specialty pharmacy.   Duration is 1 hour.     Having rhinitis symptoms at times.   If she feels symptoms she takes otc meds and this can help.     Prior history 10/25/2018  Onset: 19 yrs old.   Pna: 2 per year since she was 19 yrs old. - recently had azithro for cap outpt . Per report   Sinusitis: none   Otitis: none   She is still having symptoms of cough and mucus production. Always productive from yellow to brown.   She does endorse emphysema as well that may contribute towards proclivity for infection.     Hospitalizations: none for pneumonia - tx outpatient     Of note: she did have  her spleen removed a few years ago. Has had recent ppv 23.     Labs:  IgG, Total Serum              625 L                   700-1600  mg/dL         Region 6 panel: negative     Component      Latest Ref Rng & Units 6/7/2018 5/24/2018   IgE      0 - 100 IU/mL  11   Immunoglobulin G      700 - 1,600 mg/dL 626 (L)    Immunoglobulin A (IgA)      87 - 352 mg/dL 172    Immunoglobulin M      26 - 217 mg/dL 80        Component      Latest Ref Rng & Units 6/7/2018   Streptococcus pneumoniae 1 Ab IgG      >1.3 ug/mL <0.1 (L)   Streptococcus pneumoniae 3 Ab IgG      >1.3 ug/mL 1.2 (L)   Streptococcus pneumoniae 4 Ab IgG      >1.3 ug/mL 0.1 (L)   Streptococcus pneumoniae 8 Ab IgG      >1.3 ug/mL 1.4   Streptococcus pneumoniae 9 Ab IgG      >1.3 ug/mL 0.2 (L)   Streptococcus pneumoniae 12 Ab IgG      >1.3 ug/mL <0.1 (L)   Streptococcus pneumoniae 14 Ab IgG      >1.3 ug/mL 22.5   Streptococcus pneumoniae 19 Ab IgG      >1.3 ug/mL 6.1   Streptococcus pneumoniae 23 Ab IgG      >1.3 ug/mL <0.1 (L)   Streptococcus pneumoniae 26 Ab IgG      >1.3 ug/mL 3.7   Streptococcus pneumoniae 51 Ab IgG      >1.3 ug/mL 0.1 (L)   Streptococcus pneumoniae 56 Ab IgG      >1.3 ug/mL 1.8   Streptococcus pneumoniae 57 Ab IgG      >1.3 ug/mL 1.3 (L)   Streptococcus pneumoniae 68 Ab IgG      >1.3 ug/mL 1.4     Component      Latest Ref Rng & Units 8/31/2018   PNEUMO AB TYPE 5 AVIDITY      AI CANCELED   Pneumo Ab Type 68(9V) Avidity      AI 1.2   PNEUMO AB TYPE 1 AVIDITY      AI TNP   PNEUMO AB TYPE 3 AVIDITY      AI <0.3   Pneumo Ab Type 4 Avidity      AI TNP   Pneumo Ab Type 26(6B) Avidity      AI <0.3   Pneumo Ab Type 8 Avidity      AI 0.7   Pneumo Ab Type 9(9N) Avidity      AI <0.3   Pneumo Ab Type 12(12F)Avidity      AI TNP   Pneumo Ab Type 14 Avidity      AI 0.8   Pneumo Ab Type 19(19F)Avidity      AI <0.3   Pneumo Ab Type 23(23F)Avidity      AI TNP   Pneumo Ab Type 51(7F) Avidity      AI 1.2   Pneumo Ab Type 56(18C)Avidity      AI 0.4       She did  "not respond to her vaccine.     Review of Systems      General: neg unexpected weight changes, fevers, chills, night sweats, malaise  HEENT: see hpi, Neg eye pain, vision changes, ear drainage, nose bleeds, throat tightness, sores in the mouth  CV: Neg chest pain, palpitations, swelling  Resp: see hpi, neg shortness of breath, hemoptysis  GI: see hpi, neg dysphagia, night abdominal pain, reflux, chronic diarrhea, chronic constipation  Derm: See Hpi, neg new rash, neg flushing  Mu/sk: Neg joint pain, joint swelling   Psych: Neg anxiety  neuro: neg chronic headaches, muscle weakness  Endo: +chronic fatigue + heat intolerance and sweating neg cold     Objective:     Vitals:    07/24/19 1308   BP: 126/62   Weight: 86.6 kg (191 lb)   Height: 5' 8" (1.727 m)        Physical Exam      General: no acute distress, well developed well nourished   HEENT:   Head:normocephalic atraumatic  Eyes: KHANH, EOMI, Neg injection, scleral icterus, or conjunctival papillary hypertrophy.  Ears: tm clear bilaterally, normal canal  Nose: nares patent   OP: mucus membranes moist, - cobblestoning, - PND, neg erythema or lesions  Neck: supple, Full range of motion, neg lymphadenopathy  Chest: full respiratory excursion no abnormal chest abnormality  Resp: diminished and wheezing in all lung lobes.   CV: RRR, neg MRG, brisk capillary refill  Abdomen: BS+, non tender, non distended  Ext:  Neg clubbing, cyanosis, pitting edema  Skin: Neg rashes or lesions   Lymph: neg supraclavicular, axillary     Assessment:       1. Hypogammaglobulinemia    2. Anti-polysaccharide antibody deficiency    3. Personal history of tobacco use, presenting hazards to health    4. Pulmonary emphysema, unspecified emphysema type    5. Wheezing on auscultation        Plan:       Hypogammaglobulinemia    Anti-polysaccharide antibody deficiency    Personal history of tobacco use, presenting hazards to health    Pulmonary emphysema, unspecified emphysema type    Wheezing on " auscultation      continue infusions, hizentra 20% 600 mg/kg/month- 13 grams per week briova. From 11 grams. Just received 5/2019. Thus far , no more pneumonia.   Discussed infusion techniques today.   Tobacco abuse contributing towards health comorbitdities   Dm may make it difficult for her to heal.   continue pulmonary care with Dr. Page and primary for blood glucose control.     Repeat IgG, IgE to see if there is any potential ABPA- rust colored brown sputum. Emphasematous changes on ct.   - negative abpa evaluation.     Pt states she has migraine issues and memory problems and would like to see neurology. Has appt not seen yet.     For sweating, trying to get in to see endocrinology.     Follow up in 6 months, sooner if needed.           Argelia Vasquez M.D.  Allergy/Immunology  [facility]   [phone] phone  [phone] fax

## 2019-07-24 NOTE — PATIENT INSTRUCTIONS
Hizentra:  Continue 13 grams weekly for infection prevention.  May consider using longer needles for leaking with infusions.     Call [phone] push [phone] for our direct extension.       Follow up in 6 months, sooner if needed.

## 2019-09-16 RX ORDER — FLUTICASONE FUROATE, UMECLIDINIUM BROMIDE AND VILANTEROL TRIFENATATE 100; 62.5; 25 UG/1; UG/1; UG/1
POWDER RESPIRATORY (INHALATION)
Qty: 60 EACH | Refills: 6 | Status: SHIPPED | OUTPATIENT
Start: 2019-09-16 | End: 2020-03-02

## 2019-11-04 ENCOUNTER — TELEPHONE (OUTPATIENT)
Dept: PULMONOLOGY | Facility: CLINIC | Age: 49
End: 2019-11-04

## 2019-11-04 DIAGNOSIS — R50.9 FEVER, UNSPECIFIED FEVER CAUSE: Primary | ICD-10-CM

## 2019-11-04 RX ORDER — AZITHROMYCIN 250 MG/1
TABLET, FILM COATED ORAL
Qty: 6 TABLET | Refills: 0 | Status: SHIPPED | OUTPATIENT
Start: 2019-11-04 | End: 2019-12-19 | Stop reason: SDUPTHER

## 2019-11-04 NOTE — TELEPHONE ENCOUNTER
Ran 104 fever Friday-has come down, has an appt with  this week but thinks she needs to get started on abx. Been doing infusions from .

## 2019-11-06 ENCOUNTER — HOSPITAL ENCOUNTER (OUTPATIENT)
Dept: RADIOLOGY | Facility: HOSPITAL | Age: 49
Discharge: HOME | End: 2019-11-06
Attending: INTERNAL MEDICINE
Payer: MEDICAID

## 2019-11-06 DIAGNOSIS — R50.9 FEVER, UNSPECIFIED FEVER CAUSE: ICD-10-CM

## 2019-11-06 PROCEDURE — 71046 X-RAY EXAM CHEST 2 VIEWS: CPT | Mod: TC

## 2019-11-07 ENCOUNTER — OFFICE VISIT (OUTPATIENT)
Dept: PULMONOLOGY | Facility: CLINIC | Age: 49
End: 2019-11-07
Payer: MEDICAID

## 2019-11-07 ENCOUNTER — TELEPHONE (OUTPATIENT)
Dept: ALLERGY | Facility: CLINIC | Age: 49
End: 2019-11-07

## 2019-11-07 VITALS
OXYGEN SATURATION: 98 % | HEIGHT: 68 IN | SYSTOLIC BLOOD PRESSURE: 124 MMHG | WEIGHT: 188 LBS | HEART RATE: 117 BPM | BODY MASS INDEX: 28.49 KG/M2 | DIASTOLIC BLOOD PRESSURE: 80 MMHG

## 2019-11-07 DIAGNOSIS — J43.9 PULMONARY EMPHYSEMA, UNSPECIFIED EMPHYSEMA TYPE: Primary | ICD-10-CM

## 2019-11-07 DIAGNOSIS — J18.9 RECURRENT PNEUMONIA: ICD-10-CM

## 2019-11-07 DIAGNOSIS — D80.6 ANTI-POLYSACCHARIDE ANTIBODY DEFICIENCY: ICD-10-CM

## 2019-11-07 DIAGNOSIS — R05.8 PRODUCTIVE COUGH: ICD-10-CM

## 2019-11-07 DIAGNOSIS — D80.1 HYPOGAMMAGLOBULINEMIA: ICD-10-CM

## 2019-11-07 DIAGNOSIS — Z87.891 PERSONAL HISTORY OF TOBACCO USE, PRESENTING HAZARDS TO HEALTH: ICD-10-CM

## 2019-11-07 DIAGNOSIS — D80.6 DEFICIENCY OF ANTI-PNEUMOCOCCAL POLYSACCHARIDE ANTIBODY: ICD-10-CM

## 2019-11-07 PROCEDURE — 99214 PR OFFICE/OUTPT VISIT, EST, LEVL IV, 30-39 MIN: ICD-10-PCS | Mod: S$GLB,,, | Performed by: INTERNAL MEDICINE

## 2019-11-07 PROCEDURE — 99214 OFFICE O/P EST MOD 30 MIN: CPT | Mod: S$GLB,,, | Performed by: INTERNAL MEDICINE

## 2019-11-07 RX ORDER — ALBUTEROL SULFATE 0.83 MG/ML
2.5 SOLUTION RESPIRATORY (INHALATION) EVERY 6 HOURS PRN
Qty: 100 EACH | Refills: 6 | Status: SHIPPED | OUTPATIENT
Start: 2019-11-07 | End: 2021-11-10 | Stop reason: SDUPTHER

## 2019-11-07 RX ORDER — BUDESONIDE 0.5 MG/2ML
0.5 INHALANT ORAL 2 TIMES DAILY
Qty: 120 ML | Refills: 5 | Status: SHIPPED | OUTPATIENT
Start: 2019-11-07 | End: 2020-03-02 | Stop reason: ALTCHOICE

## 2019-11-07 NOTE — PROGRESS NOTES
"Subjective:       Patient ID: Araceli Ibarra is a 49 y.o. female.    Chief Complaint: Emphysema (6 month follow up-recent exacerbation-meds sent and normal CXR.)    5/17/2017 - Here for follow up, overall doing much better with ANORO.  Did get some chest wall injury at a fair and has some chest discomfort as a result.  She continues to work on stopping smoking. No other new complaints    1/21/2017 - Here for follow up, still smoking (lots of smokers in the house).  She is currently using cigarettes and a VAPE.  Has dyspnea (primarily with exertion), has daily cough (mucus currently brown - her usual).  She does feel that her breathing is worse over the last month or so.  Taking medications regularly.  Working on stopping smoking.    5/22/2018 - Here for follow up has been on TRELEGY for 4-6 months, still with problems with cough, congestion which has not been much better.  Still smoking (d/w her)., still using VAPE.  Mother has had similar problems.  Has had elevated WBC and is going to see hematologist.  Denies much nasal congestion, sputum is described as a "caramel" color.  No recent CXR.    7/31/2018 - Here for follow up, feels ok for now, has chronic cough but not worse.  Has chronic dyspnea (not worse).  Still smoking about 1/2 PPD.  Reviewed CXR, CT  And sinus films with pt.  No other new issues.    10/30/2018 - Here for follow up, has been getting immunoglobulin infusions.  Smoking about 6 cig/day.  Overall doing ok, no recent pneumonia, no recent ER visits or hospitalizations.  Sputum is still described as brown but not as much as usual.  Has also been approved for DALIRESP.and started about 2 weeks ago.    2/5/2019 - Here for follow up, still with daily mucus (white to a little green).  Breathing has been OK.  Usually sinuses are not as much of a problem.  No reflux or heartburn.  Has been on daliresp for about 4 months and on TRELEGY.  Still smoking (was as high as 1 PPD but back to < 1/2 PPD).  Getting " "IVIG and hasn't had pneumonia since.  No hemoptysis.    5/7/2019 - Here for follow up, overall stable on present meds (TRELEGY and DALIRESP - she has been on numerous other regimens and this has been the best at controlling her symptoms).  Pt is currently stable on present medications with no recent increases in their symptoms or use of rescue medications.  I have reviewed the medical regimen and re-educated the pt on the role of rescue and controlling medications.  All questions answered.  Inhaler technique seems adequate.  Still getting immunoglobulin infusions (dose has been increased recently).  Still smoking anywhere from (1/4 - 3/4 PPD) - lives in house with multiple smokers.  CT scan showed COPD but no acute changes.  Sputum culture and AFB were negative.     11/7/2019 - Had URI and temp to 104, CXR was OK, treated with antibiotics and feels better.  She does feel that she "let it go too far".  Has had issues with exposures to things, AC went out, still with a lot of stress.  Still smoking about < 1/2 PPD but has second hand exposure as well.  Getting infusions from Dr Vasquez.  Does not vape at this time.  [place] a fall [place] [employer] and had some back injury which has slowed her down.    COPD    GOLD A    Last PFT - 2/10/17  FEV1- 90 % DLCO - 59 %     + LABA/LAMA/ICS/daliresp    + prn ALBUTEROL    mMRC -  0 - SOB with strenuous exercise   - + 1 - SOB level ground, slight hill   -  2 - SOB walk slower or stop for breath level ground   -  3 - SOB at 100 yards or after few minutes   -  4 - SOB in house, dressing    Referral to PULMONARY REHABILITATION -   NO    Tested for alpha-1-antitrypsin - NO    Cigarette Counseling    Currently smoking 1/2 packs per day  30 pack years    I have counseled pt for 3-5 minutes regarding cigarette cessation.  This has included the need to stop smoking as well as strategies, including but not limited to "cold turkey", CHANTIX (including risks and benefits of kasia drug), nicotine " "replacement and WELLBUTRIN.      COPD   Associated symptoms include coughing. Pertinent negatives include no abdominal pain, arthralgias, chest pain, chills, congestion, fatigue, fever, myalgias, nausea, numbness, sore throat or weakness.     Review of Systems   Constitutional: Negative for activity change, appetite change, chills, fatigue and fever.   HENT: Negative for congestion, hearing loss, nosebleeds, postnasal drip, sneezing and sore throat.    Respiratory: Positive for cough and shortness of breath. Negative for apnea, choking, chest tightness, wheezing and stridor.    Cardiovascular: Negative for chest pain, palpitations and leg swelling.   Gastrointestinal: Negative for abdominal distention, abdominal pain, constipation, diarrhea and nausea.   Genitourinary: Negative for dysuria, frequency and urgency.   Musculoskeletal: Negative for arthralgias and myalgias.   Neurological: Negative for syncope, weakness and numbness.   Hematological: Negative for adenopathy.   Psychiatric/Behavioral: Negative for dysphoric mood. The patient is nervous/anxious.        Objective:       Vitals:    11/07/19 1116   BP: 124/80   Pulse: (!) 117   SpO2: 98%   Weight: 85.3 kg (188 lb)   Height: 5' 8" (1.727 m)         Physical Exam   Constitutional: She is oriented to person, place, and time. She appears well-developed and well-nourished. No distress.   HENT:   Head: Normocephalic and atraumatic.   Nose: Nose normal.   Mouth/Throat: Oropharynx is clear and moist.   Eyes: Pupils are equal, round, and reactive to light. Conjunctivae and EOM are normal.   Neck: Normal range of motion. Neck supple. No JVD present. No tracheal deviation present. No thyromegaly present.   Cardiovascular: Normal rate, regular rhythm, normal heart sounds and intact distal pulses. Exam reveals no gallop and no friction rub.   No murmur heard.  Pulmonary/Chest: Effort normal and breath sounds normal. No stridor. No respiratory distress. She has no " wheezes. She has no rales. She exhibits no tenderness.   Abdominal: Soft. Bowel sounds are normal. She exhibits no distension. There is no tenderness.   Musculoskeletal: Normal range of motion. She exhibits no edema or tenderness.   Lymphadenopathy:     She has no cervical adenopathy.   Neurological: She is alert and oriented to person, place, and time. No cranial nerve deficit.   Skin: Skin is warm and dry. She is not diaphoretic.   Psychiatric: She has a normal mood and affect. Her behavior is normal.   Nursing note and vitals reviewed.      Assessment:       No diagnosis found.    Plan:       Problem List Items Addressed This Visit        Pulmonary    Recurrent pneumonia  - resolved      Chronic obstructive pulmonary disease - Primary  - continue present medications  - needs to stop smoking  - actually seems better on current regimen  - had recent exacerbation with fever - better now  - RTC 6 months         Other    Personal history of tobacco use, presenting hazards to health  - d/w pt about the need to stop          Darrel Page MD

## 2019-11-07 NOTE — TELEPHONE ENCOUNTER
Was here to see  today, recent fever and had to take abx. Asking to look at infusion dose and see if you think she needs to go up again?

## 2019-11-13 DIAGNOSIS — D80.6 DEFICIENCY OF ANTI-PNEUMOCOCCAL POLYSACCHARIDE ANTIBODY: Primary | ICD-10-CM

## 2019-11-13 DIAGNOSIS — D80.1 HYPOGAMMAGLOBULINEMIA: ICD-10-CM

## 2019-11-13 DIAGNOSIS — D80.6 ANTI-POLYSACCHARIDE ANTIBODY DEFICIENCY: ICD-10-CM

## 2019-11-14 NOTE — TELEPHONE ENCOUNTER
Increase to 14 grams weekly hizentra due to recent zpack in 11/2019. Has emphysema and may be metabolizing medication faster.

## 2019-11-25 ENCOUNTER — TELEPHONE (OUTPATIENT)
Dept: PULMONOLOGY | Facility: CLINIC | Age: 49
End: 2019-11-25

## 2019-11-25 NOTE — TELEPHONE ENCOUNTER
Felt bad all last week,  has increased hizentra dose (for next months infusion) but this morning has 103 temp. Cough, congestion. Asking for abx and steroid

## 2019-11-26 RX ORDER — PREDNISONE 10 MG/1
TABLET ORAL
Qty: 18 TABLET | Refills: 0 | Status: SHIPPED | OUTPATIENT
Start: 2019-11-26 | End: 2020-05-07

## 2019-11-26 RX ORDER — DOXYCYCLINE HYCLATE 100 MG
100 TABLET ORAL 2 TIMES DAILY
Qty: 14 TABLET | Refills: 0 | Status: SHIPPED | OUTPATIENT
Start: 2019-11-26 | End: 2020-05-07

## 2019-12-02 RX ORDER — ROFLUMILAST 500 UG/1
TABLET ORAL
Qty: 30 TABLET | Refills: 11 | Status: SHIPPED | OUTPATIENT
Start: 2019-12-02 | End: 2020-11-02 | Stop reason: SDUPTHER

## 2019-12-06 ENCOUNTER — TELEPHONE (OUTPATIENT)
Dept: ALLERGY | Facility: CLINIC | Age: 49
End: 2019-12-06

## 2019-12-06 NOTE — TELEPHONE ENCOUNTER
Patient called to check the status of the PA for Daliresp.  Upon chart review, the PA was submitted earlier in the week and we have not rec'd a decision.  She states understanding.

## 2019-12-19 ENCOUNTER — TELEPHONE (OUTPATIENT)
Dept: PULMONOLOGY | Facility: CLINIC | Age: 49
End: 2019-12-19

## 2019-12-19 DIAGNOSIS — R05.9 COUGH: Primary | ICD-10-CM

## 2019-12-19 RX ORDER — AZITHROMYCIN 250 MG/1
TABLET, FILM COATED ORAL
Qty: 6 TABLET | Refills: 1 | Status: SHIPPED | OUTPATIENT
Start: 2019-12-19 | End: 2020-05-07

## 2019-12-19 RX ORDER — AZITHROMYCIN 250 MG/1
TABLET, FILM COATED ORAL
Qty: 6 TABLET | Refills: 0 | Status: SHIPPED | OUTPATIENT
Start: 2019-12-19 | End: 2019-12-19

## 2019-12-19 NOTE — TELEPHONE ENCOUNTER
----- Message from Yaquelin Mullen sent at 12/19/2019  9:22 AM CST -----  Contact: Self, [phone]  The patient called stating she thinks she has pneumonia(she can taste it).  Please call her back at her request, she is requesting medication for treatment.  Thanks in advance.

## 2020-01-23 ENCOUNTER — OFFICE VISIT (OUTPATIENT)
Dept: ALLERGY | Facility: CLINIC | Age: 50
End: 2020-01-23
Payer: MEDICAID

## 2020-01-23 VITALS
HEART RATE: 117 BPM | WEIGHT: 185 LBS | HEIGHT: 68 IN | SYSTOLIC BLOOD PRESSURE: 120 MMHG | BODY MASS INDEX: 28.04 KG/M2 | OXYGEN SATURATION: 99 % | DIASTOLIC BLOOD PRESSURE: 84 MMHG

## 2020-01-23 DIAGNOSIS — D80.6 DEFICIENCY OF ANTI-PNEUMOCOCCAL POLYSACCHARIDE ANTIBODY: Primary | ICD-10-CM

## 2020-01-23 DIAGNOSIS — R05.8 PRODUCTIVE COUGH: ICD-10-CM

## 2020-01-23 DIAGNOSIS — D80.1 HYPOGAMMAGLOBULINEMIA: ICD-10-CM

## 2020-01-23 DIAGNOSIS — J18.9 RECURRENT PNEUMONIA: ICD-10-CM

## 2020-01-23 DIAGNOSIS — R73.03 BORDERLINE DIABETES MELLITUS: ICD-10-CM

## 2020-01-23 PROCEDURE — 99213 OFFICE O/P EST LOW 20 MIN: CPT | Mod: S$GLB,,, | Performed by: ALLERGY & IMMUNOLOGY

## 2020-01-23 PROCEDURE — 99213 PR OFFICE/OUTPT VISIT, EST, LEVL III, 20-29 MIN: ICD-10-PCS | Mod: S$GLB,,, | Performed by: ALLERGY & IMMUNOLOGY

## 2020-01-23 NOTE — PATIENT INSTRUCTIONS
Continue 14 grams weekly, hizentra.     Let me know if you need more antibiotics, we may need to increase the dose.     I'd like to wait 3 months, to see how you do.       Continue pulmonary care with Dr. Page.     Labs at [facility] in May to check your IgG. Non fasting.       If you need to be seen sooner than 3 months, let me know.     [phone].

## 2020-01-23 NOTE — PROGRESS NOTES
Subjective:       Patient ID: Araceli Ibarra is a 49 y.o. female.    Chief Complaint: Hypogamm (hypogamm follow up. Lot of infections over the holidays, doing better now, Increased her infusions. )    HPI     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   as she did not respond to her pneumonia vaccine.   This is in addition to her hypogam.     Summer 2019 she fell in a parking lot at [employer]. Having this fall has made it difficult for her to move.   In the past, has endorsed brown sputum.   eval for ABPA- negative   Cough is worst at night than in the morning.   Chest ct showed emphysematous changes. 2/2019  She was given z pack for her pneumonia 2/2019.  She has then had an increase in her hizentra to 14 grams weekly in November 2019.  She had doxycycline 11/2019 and azithromycin 12/2019.    increase 13 grams weekly 5/2019 hizentra due to pneumonia and given abx.   She is with [facility].     Infection history:  abx since last visit: 2-3 since July.     zpack for pna per report. 2/2019   Doxycycline and azithromycin 11/12/2019  started her hizentra 20% 14 grams per week. 600 mg/kg.   Mainly se are fatigue. Can have headaches.   No headaches with increase in dose.   Needs longer needles had leaking in her legs. Most recent dose.   Infuses in legs and stomach, rotates sites.   Possible 9 mm needle, discussed 12 -14 mm needle. Still has leakage with infusions.   She is using [facility] for infusions as specialty pharmacy.   Duration is 1 hour.     Having rhinitis symptoms at times.   If she feels symptoms she takes otc meds and this can help.     Prior history 10/25/2018  Onset: 19 yrs old.   Pna: 2 per year since she was 19 yrs old. - recently had azithro for cap outpt . Per report   Sinusitis: none   Otitis: none   She is still having symptoms of cough and mucus production. Always productive from yellow to brown.   She does endorse emphysema as well that may contribute towards proclivity  for infection.     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 Has had recent ppv 23.     Labs:  IgG, Total Serum              625 L                   700-1600  mg/dL         Region 6 panel: negative     Component      Latest Ref Rng & Units 6/7/2018 5/24/2018   IgE      0 - 100 IU/mL  11   Immunoglobulin G      700 - 1,600 mg/dL 626 (L)    Immunoglobulin A (IgA)      87 - 352 mg/dL 172    Immunoglobulin M      26 - 217 mg/dL 80      Component      Latest Ref Rng & Units 5/7/2019   IgG, Serum      700 - 1600 mg/dL 1107       Component      Latest Ref Rng & Units 6/7/2018   Streptococcus pneumoniae 1 Ab IgG      >1.3 ug/mL <0.1 (L)   Streptococcus pneumoniae 3 Ab IgG      >1.3 ug/mL 1.2 (L)   Streptococcus pneumoniae 4 Ab IgG      >1.3 ug/mL 0.1 (L)   Streptococcus pneumoniae 8 Ab IgG      >1.3 ug/mL 1.4   Streptococcus pneumoniae 9 Ab IgG      >1.3 ug/mL 0.2 (L)   Streptococcus pneumoniae 12 Ab IgG      >1.3 ug/mL <0.1 (L)   Streptococcus pneumoniae 14 Ab IgG      >1.3 ug/mL 22.5   Streptococcus pneumoniae 19 Ab IgG      >1.3 ug/mL 6.1   Streptococcus pneumoniae 23 Ab IgG      >1.3 ug/mL <0.1 (L)   Streptococcus pneumoniae 26 Ab IgG      >1.3 ug/mL 3.7   Streptococcus pneumoniae 51 Ab IgG      >1.3 ug/mL 0.1 (L)   Streptococcus pneumoniae 56 Ab IgG      >1.3 ug/mL 1.8   Streptococcus pneumoniae 57 Ab IgG      >1.3 ug/mL 1.3 (L)   Streptococcus pneumoniae 68 Ab IgG      >1.3 ug/mL 1.4     Component      Latest Ref Rng & Units 8/31/2018   PNEUMO AB TYPE 5 AVIDITY      AI CANCELED   Pneumo Ab Type 68(9V) Avidity      AI 1.2   PNEUMO AB TYPE 1 AVIDITY      AI TNP   PNEUMO AB TYPE 3 AVIDITY      AI <0.3   Pneumo Ab Type 4 Avidity      AI TNP   Pneumo Ab Type 26(6B) Avidity      AI <0.3   Pneumo Ab Type 8 Avidity      AI 0.7   Pneumo Ab Type 9(9N) Avidity      AI <0.3   Pneumo Ab Type 12(12F)Avidity      AI TNP   Pneumo Ab Type 14 Avidity      AI 0.8  "  Pneumo Ab Type 19(19F)Avidity      AI <0.3   Pneumo Ab Type 23(23F)Avidity      AI TNP   Pneumo Ab Type 51(7F) Avidity      AI 1.2   Pneumo Ab Type 56(18C)Avidity      AI 0.4       She did not respond to her vaccine.     Review of Systems      General: neg unexpected weight changes, fevers, chills, night sweats, malaise  HEENT: see hpi, Neg eye pain, vision changes, ear drainage, nose bleeds, throat tightness, sores in the mouth  CV: Neg chest pain, palpitations, swelling  Resp: see hpi, neg shortness of breath, hemoptysis  GI: see hpi, neg dysphagia, night abdominal pain, reflux, chronic diarrhea, chronic constipation  Derm: See Hpi, neg new rash, neg flushing  Mu/sk: Neg joint pain, joint swelling   Psych: Neg anxiety  neuro: neg chronic headaches, muscle weakness  Endo: +chronic fatigue + heat intolerance and sweating neg cold     Objective:     Vitals:    01/23/20 1305   BP: 120/84   Pulse: (!) 117   SpO2: 99%   Weight: 83.9 kg (185 lb)   Height: 5' 8" (1.727 m)        Physical Exam      General: no acute distress, well developed well nourished   HEENT:   Head:normocephalic atraumatic  Eyes: KHANH, EOMI, Neg injection, scleral icterus, or conjunctival papillary hypertrophy.  Ears: tm clear bilaterally, normal canal  Nose: nares patent   OP: mucus membranes moist, - cobblestoning, - PND, neg erythema or lesions  Neck: supple, Full range of motion, neg lymphadenopathy  Chest: full respiratory excursion no abnormal chest abnormality  Resp: right anterior lobe with wheezing and rhonchi, other lobes diminished.    CV: RRR, neg MRG, brisk capillary refill  Abdomen: BS+, non tender, non distended  Ext:  Neg clubbing, cyanosis, pitting edema  Skin: Neg rashes or lesions   Lymph: neg supraclavicular, axillary     Assessment:       1. Deficiency of anti-pneumococcal polysaccharide antibody    2. Hypogammaglobulinemia    3. Productive cough    4. Recurrent pneumonia    5. Borderline diabetes mellitus        Plan:     "   Deficiency of anti-pneumococcal polysaccharide antibody  -     IgG; Future; Expected date: 01/23/2020    Hypogammaglobulinemia    Productive cough    Recurrent pneumonia    Borderline diabetes mellitus      continue infusions, hizentra 20% 600 mg/kg/month- 14 grams per week with [facility].   From 11 grams received 5/2019.   Will see if the 14 grams weekly increase will help as this just occurred in December 2019. May take 1-2 months to reach a better steady state.     Discussed infusion techniques.  Tobacco abuse contributing towards health comorbitdities   Dm may make it difficult for her to heal.   continue pulmonary care with Dr. Page and primary for blood glucose control.     Repeat IgG, IgE to see if there is any potential ABPA- rust colored brown sputum. Emphasematous changes on ct.   - negative abpa evaluation.     Repeat IgG in May 2020 - [facility] labs.     Pt states she has migraine issues and memory problems and would like to see neurology. Has seen neurology.     For sweating, trying to get in to see endocrinology. No appt yet.     Follow up in 3 months, sooner if needed.           Argelia Vasquez M.D.  Allergy/Immunology  [facility]   [phone] phone  [phone] fax

## 2020-03-02 RX ORDER — FLUTICASONE FUROATE, UMECLIDINIUM BROMIDE AND VILANTEROL TRIFENATATE 100; 62.5; 25 UG/1; UG/1; UG/1
POWDER RESPIRATORY (INHALATION)
Qty: 60 EACH | Refills: 6 | Status: SHIPPED | OUTPATIENT
Start: 2020-03-02 | End: 2020-09-10

## 2020-04-09 ENCOUNTER — OFFICE VISIT (OUTPATIENT)
Dept: ALLERGY | Facility: CLINIC | Age: 50
End: 2020-04-09
Payer: MEDICAID

## 2020-04-09 DIAGNOSIS — Z87.891 PERSONAL HISTORY OF TOBACCO USE, PRESENTING HAZARDS TO HEALTH: ICD-10-CM

## 2020-04-09 DIAGNOSIS — R73.03 BORDERLINE DIABETES MELLITUS: ICD-10-CM

## 2020-04-09 DIAGNOSIS — D80.6 ANTI-POLYSACCHARIDE ANTIBODY DEFICIENCY: ICD-10-CM

## 2020-04-09 DIAGNOSIS — Z90.81 HISTORY OF SPLENECTOMY: ICD-10-CM

## 2020-04-09 DIAGNOSIS — D80.6 DEFICIENCY OF ANTI-PNEUMOCOCCAL POLYSACCHARIDE ANTIBODY: Primary | ICD-10-CM

## 2020-04-09 DIAGNOSIS — D80.1 HYPOGAMMAGLOBULINEMIA: ICD-10-CM

## 2020-04-09 PROCEDURE — 99214 OFFICE O/P EST MOD 30 MIN: CPT | Mod: 95,,, | Performed by: ALLERGY & IMMUNOLOGY

## 2020-04-09 PROCEDURE — 99214 PR OFFICE/OUTPT VISIT, EST, LEVL IV, 30-39 MIN: ICD-10-PCS | Mod: 95,,, | Performed by: ALLERGY & IMMUNOLOGY

## 2020-04-09 NOTE — PROGRESS NOTES
"Subjective:       Patient ID: Araceli Ibarra is a 49 y.o. female.    Chief Complaint: Immunodeficiency    HPI     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    Recently sang karyoke on a Saturday.  Having "a lot of mucus"  That next Thursday she could "taste infection."  Then the next week again.   She states after infusing she will feel better.     SAD   as she did not respond to her pneumonia vaccine.   This is in addition to her hypogam.   Currently infusing 14 grams of hizentra weekly sub q and feels improvement being on the IgG replacement therapy.   600 mg/kg/ month. Feels need to go up.   She is with [facility] pharmacy.  Mainly se are fatigue. Can have headaches.   No headaches with increase in dose.   Needs longer needles had leaking in her legs. Most recent dose.   Infuses in legs and stomach, rotates sites.   Possible 9 mm needle, discussed 12 -14 mm needle. Still has leakage with infusions in legs.   Duration is 1 hour.     Cough   eval for ABPA- negative   In the past, has endorsed brown sputum.   Chest ct showed emphysematous changes. 2/2019  Currently having mucus coughing.     Abx:    Feels that hizentra has stopped pna and the "stabbing pain."    She was given z pack for her pneumonia 2/2019.  She has then had an increase in her hizentra to 14 grams weekly in November 2019.  She had doxycycline 11/2019 and azithromycin 12/2019.  increase 13 grams weekly 5/2019 hizentra due to pneumonia and given abx.   Recurrent infections Onset: 19 yrs old.   Pna: 2 per year since she was 19 yrs old. - recently had azithro for cap outpt . Per report   Sinusitis: none   Otitis: none   She is still having symptoms of cough and mucus production. Always productive from yellow to brown.   She does endorse emphysema as well that may contribute towards proclivity for infection.     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 Has " had recent ppv 23.     Labs:  IgG, Total Serum              625 L                   700-1600  mg/dL         Region 6 panel: negative     Component      Latest Ref Rng & Units 6/7/2018 5/24/2018   IgE      0 - 100 IU/mL  11   Immunoglobulin G      700 - 1,600 mg/dL 626 (L)    Immunoglobulin A (IgA)      87 - 352 mg/dL 172    Immunoglobulin M      26 - 217 mg/dL 80      Component      Latest Ref Rng & Units 5/7/2019   IgG, Serum      700 - 1600 mg/dL 1107       Component      Latest Ref Rng & Units 6/7/2018   Streptococcus pneumoniae 1 Ab IgG      >1.3 ug/mL <0.1 (L)   Streptococcus pneumoniae 3 Ab IgG      >1.3 ug/mL 1.2 (L)   Streptococcus pneumoniae 4 Ab IgG      >1.3 ug/mL 0.1 (L)   Streptococcus pneumoniae 8 Ab IgG      >1.3 ug/mL 1.4   Streptococcus pneumoniae 9 Ab IgG      >1.3 ug/mL 0.2 (L)   Streptococcus pneumoniae 12 Ab IgG      >1.3 ug/mL <0.1 (L)   Streptococcus pneumoniae 14 Ab IgG      >1.3 ug/mL 22.5   Streptococcus pneumoniae 19 Ab IgG      >1.3 ug/mL 6.1   Streptococcus pneumoniae 23 Ab IgG      >1.3 ug/mL <0.1 (L)   Streptococcus pneumoniae 26 Ab IgG      >1.3 ug/mL 3.7   Streptococcus pneumoniae 51 Ab IgG      >1.3 ug/mL 0.1 (L)   Streptococcus pneumoniae 56 Ab IgG      >1.3 ug/mL 1.8   Streptococcus pneumoniae 57 Ab IgG      >1.3 ug/mL 1.3 (L)   Streptococcus pneumoniae 68 Ab IgG      >1.3 ug/mL 1.4     Component      Latest Ref Rng & Units 8/31/2018   PNEUMO AB TYPE 5 AVIDITY      AI CANCELED   Pneumo Ab Type 68(9V) Avidity      AI 1.2   PNEUMO AB TYPE 1 AVIDITY      AI TNP   PNEUMO AB TYPE 3 AVIDITY      AI <0.3   Pneumo Ab Type 4 Avidity      AI TNP   Pneumo Ab Type 26(6B) Avidity      AI <0.3   Pneumo Ab Type 8 Avidity      AI 0.7   Pneumo Ab Type 9(9N) Avidity      AI <0.3   Pneumo Ab Type 12(12F)Avidity      AI TNP   Pneumo Ab Type 14 Avidity      AI 0.8   Pneumo Ab Type 19(19F)Avidity      AI <0.3   Pneumo Ab Type 23(23F)Avidity      AI TNP   Pneumo Ab Type 51(7F) Avidity      AI 1.2    Pneumo Ab Type 56(18C)Avidity      AI 0.4       She did not respond to her vaccine.     Review of Systems      General: neg unexpected weight changes, fevers, chills, night sweats, malaise  HEENT: see hpi, Neg eye pain, vision changes, ear drainage, nose bleeds, throat tightness, sores in the mouth  CV: Neg chest pain, palpitations, swelling  Resp: see hpi, neg shortness of breath, hemoptysis  GI: see hpi, neg dysphagia, night abdominal pain, reflux, chronic diarrhea, chronic constipation  Derm: See Hpi, neg new rash, neg flushing  Mu/sk: Neg joint pain, joint swelling   Psych: Neg anxiety  neuro: neg chronic headaches, muscle weakness  Endo: +chronic fatigue + heat intolerance and sweating neg cold     Objective:     There were no vitals filed for this visit.     Physical Exam      General: no acute distress, well developed well nourished       Assessment:       1. Deficiency of anti-pneumococcal polysaccharide antibody    2. Borderline diabetes mellitus    3. History of splenectomy    4. Personal history of tobacco use, presenting hazards to health    5. Hypogammaglobulinemia    6. Anti-polysaccharide antibody deficiency        Plan:       Deficiency of anti-pneumococcal polysaccharide antibody  -     IgG; Future; Expected date: 04/09/2020  -     immun glob G,IgG,-pro-IgA 0-50 (HIZENTRA) 10 gram/50 mL (20 %) Soln; 15 grams weekly subcutaneously  Dispense: 100 mL; Refill: 11    Borderline diabetes mellitus    History of splenectomy    Personal history of tobacco use, presenting hazards to health    Hypogammaglobulinemia    Anti-polysaccharide antibody deficiency            Hypogam and SAD   Increase one more time to 15 grams weekly and this should help keep her well. She has done better on higher dose.   continue infusions, hizentra 20% 600 mg/kg/month- 14 grams per week with [facility] pharmacy.   From 11 grams received 5/2019.   Will see if the 14 grams weekly increase will help as this just occurred in  December 2019. May take 1-2 months to reach a better steady state.     Discussed infusion techniques.  Tobacco abuse contributing towards health comorbitdities   Dm may make it difficult for her to heal.     continue pulmonary care with Dr. Page    primary for blood glucose control.     Repeated IgG, IgE to see if there is any potential ABPA- rust colored brown sputum. Emphasematous changes on ct.   - negative abpa evaluation.     Repeat IgG in May 2020 - [facility] labs.     has migraine issues and memory problems and sees neurology.     For sweating, trying to get in to see endocrinology. No appt yet.     Follow up in 5 months, sooner if needed.   > 50 % spent in counseling and coordinating care.  Time spent with patient: 30 mins     The patient location is: home   The chief complaint leading to consultation is: immune deficiency   Visit type: Virtual visit with synchronous audio and video  Total time spent with patient: 25 mins   Each patient to whom he or she provides medical services by telemedicine is:  (1) informed of the relationship between the physician and patient and the respective role of any other health care provider with respect to management of the patient; and (2) notified that he or she may decline to receive medical services by telemedicine and may withdraw from such care at any time.        Argelia Vasquez M.D.  Allergy/Immunology  [facility]   [phone] phone  [phone] fax

## 2020-05-07 ENCOUNTER — OFFICE VISIT (OUTPATIENT)
Dept: PULMONOLOGY | Facility: CLINIC | Age: 50
End: 2020-05-07
Payer: MEDICAID

## 2020-05-07 DIAGNOSIS — J18.9 RECURRENT PNEUMONIA: Primary | ICD-10-CM

## 2020-05-07 DIAGNOSIS — R05.9 COUGH: ICD-10-CM

## 2020-05-07 DIAGNOSIS — Z87.891 PERSONAL HISTORY OF TOBACCO USE, PRESENTING HAZARDS TO HEALTH: ICD-10-CM

## 2020-05-07 DIAGNOSIS — J43.9 PULMONARY EMPHYSEMA, UNSPECIFIED EMPHYSEMA TYPE: ICD-10-CM

## 2020-05-07 PROCEDURE — 99213 OFFICE O/P EST LOW 20 MIN: CPT | Mod: 95,,, | Performed by: INTERNAL MEDICINE

## 2020-05-07 PROCEDURE — 99213 PR OFFICE/OUTPT VISIT, EST, LEVL III, 20-29 MIN: ICD-10-PCS | Mod: 95,,, | Performed by: INTERNAL MEDICINE

## 2020-05-07 RX ORDER — LIDOCAINE 50 MG/G
2 PATCH TOPICAL DAILY
COMMUNITY
Start: 2020-04-15

## 2020-05-07 RX ORDER — ALBUTEROL SULFATE 90 UG/1
2 AEROSOL, METERED RESPIRATORY (INHALATION) EVERY 6 HOURS PRN
Qty: 18 G | Refills: 5 | Status: SHIPPED | OUTPATIENT
Start: 2020-05-07 | End: 2021-11-10 | Stop reason: SDUPTHER

## 2020-05-07 RX ORDER — SUMATRIPTAN SUCCINATE 25 MG/1
25 TABLET ORAL DAILY PRN
COMMUNITY
Start: 2020-04-21

## 2020-05-07 NOTE — PROGRESS NOTES
"Subjective:       Patient ID: Araceli Ibarra is a 49 y.o. female.    Chief Complaint: No chief complaint on file.    5/17/2017 - Here for follow up, overall doing much better with ANORO.  Did get some chest wall injury at a fair and has some chest discomfort as a result.  She continues to work on stopping smoking. No other new complaints    1/21/2017 - Here for follow up, still smoking (lots of smokers in the house).  She is currently using cigarettes and a VAPE.  Has dyspnea (primarily with exertion), has daily cough (mucus currently brown - her usual).  She does feel that her breathing is worse over the last month or so.  Taking medications regularly.  Working on stopping smoking.    5/22/2018 - Here for follow up has been on TRELEGY for 4-6 months, still with problems with cough, congestion which has not been much better.  Still smoking (d/w her)., still using VAPE.  Mother has had similar problems.  Has had elevated WBC and is going to see hematologist.  Denies much nasal congestion, sputum is described as a "caramel" color.  No recent CXR.    7/31/2018 - Here for follow up, feels ok for now, has chronic cough but not worse.  Has chronic dyspnea (not worse).  Still smoking about 1/2 PPD.  Reviewed CXR, CT  And sinus films with pt.  No other new issues.    10/30/2018 - Here for follow up, has been getting immunoglobulin infusions.  Smoking about 6 cig/day.  Overall doing ok, no recent pneumonia, no recent ER visits or hospitalizations.  Sputum is still described as brown but not as much as usual.  Has also been approved for DALIRESP.and started about 2 weeks ago.    2/5/2019 - Here for follow up, still with daily mucus (white to a little green).  Breathing has been OK.  Usually sinuses are not as much of a problem.  No reflux or heartburn.  Has been on daliresp for about 4 months and on TRELEGY.  Still smoking (was as high as 1 PPD but back to < 1/2 PPD).  Getting IVIG and hasn't had pneumonia since.  No " "hemoptysis.    5/7/2019 - Here for follow up, overall stable on present meds (TRELEGY and DALIRESP - she has been on numerous other regimens and this has been the best at controlling her symptoms).  Pt is currently stable on present medications with no recent increases in their symptoms or use of rescue medications.  I have reviewed the medical regimen and re-educated the pt on the role of rescue and controlling medications.  All questions answered.  Inhaler technique seems adequate.  Still getting immunoglobulin infusions (dose has been increased recently).  Still smoking anywhere from (1/4 - 3/4 PPD) - lives in house with multiple smokers.  CT scan showed COPD but no acute changes.  Sputum culture and AFB were negative.     11/7/2019 - Had URI and temp to 104, CXR was OK, treated with antibiotics and feels better.  She does feel that she "let it go too far".  Has had issues with exposures to things, AC went out, still with a lot of stress.  Still smoking about < 1/2 PPD but has second hand exposure as well.  Getting infusions from Dr Vasquez.  Does not vape at this time.  Had a fall at [employer] and had some back injury which has slowed her down.    5/7/2020 - Virtual Visit    The chief complaint leading to consultation is: follow up  The patient location is:  in car  Visit type: Virtual visit with synchronous audio and video    There was a delay in getting some of her hizentra infusion and she felt that she was having more trouble but since restarting she feels that she is better (reports that dose has been increased).  Still smoking about 7-8 cigarettes per day.  Not vaping at this time.    I have discussed the limitations of a virtual visit with the patient including the fact that there are no vital signs and no way to fully examine the patient.  This could lead to misdiagnosis and possibly to delays in appropriate care.    The physical exam in this note is pulled forward from prior visits.  It will be updated " "based on any findings which I can discern based on seeing the patient on a video screen.  I will not eliminate findings from prior exams at this visit.      COPD    GOLD A    Last PFT - 2/10/17  FEV1- 90 % DLCO - 59 %     + LABA/LAMA/ICS/daliresp    + prn ALBUTEROL    mMRC -  0 - SOB with strenuous exercise   - + 1 - SOB level ground, slight hill   -  2 - SOB walk slower or stop for breath level ground   -  3 - SOB at 100 yards or after few minutes   -  4 - SOB in house, dressing    Referral to PULMONARY REHABILITATION -   NO    Tested for alpha-1-antitrypsin - NO    Cigarette Counseling    Currently smoking 1/2 packs per day  30 pack years    I have counseled pt for 3-5 minutes regarding cigarette cessation.  This has included the need to stop smoking as well as strategies, including but not limited to "cold turkey", CHANTIX (including risks and benefits of kasia drug), nicotine replacement and WELLBUTRIN.      COPD   Associated symptoms include coughing. Pertinent negatives include no abdominal pain, arthralgias, chest pain, chills, congestion, fatigue, fever, myalgias, nausea, numbness, sore throat or weakness.     Review of Systems   Constitutional: Negative for activity change, appetite change, chills, fatigue and fever.   HENT: Negative for congestion, hearing loss, nosebleeds, postnasal drip, sneezing and sore throat.    Respiratory: Positive for cough and shortness of breath. Negative for apnea, choking, chest tightness, wheezing and stridor.    Cardiovascular: Negative for chest pain, palpitations and leg swelling.   Gastrointestinal: Negative for abdominal distention, abdominal pain, constipation, diarrhea and nausea.   Genitourinary: Negative for dysuria, frequency and urgency.   Musculoskeletal: Negative for arthralgias and myalgias.   Neurological: Negative for syncope, weakness and numbness.   Hematological: Negative for adenopathy.   Psychiatric/Behavioral: Negative for dysphoric mood. The patient is " nervous/anxious.        Objective:       There were no vitals filed for this visit.      Physical Exam   Constitutional: She is oriented to person, place, and time. She appears well-developed and well-nourished. No distress.   HENT:   Head: Normocephalic and atraumatic.   Nose: Nose normal.   Mouth/Throat: Oropharynx is clear and moist.   Eyes: Pupils are equal, round, and reactive to light. Conjunctivae and EOM are normal.   Neck: Normal range of motion. Neck supple. No JVD present. No tracheal deviation present. No thyromegaly present.   Cardiovascular: Normal rate, regular rhythm, normal heart sounds and intact distal pulses. Exam reveals no gallop and no friction rub.   No murmur heard.  Pulmonary/Chest: Effort normal and breath sounds normal. No stridor. No respiratory distress. She has no wheezes. She has no rales. She exhibits no tenderness.   Abdominal: Soft. Bowel sounds are normal. She exhibits no distension. There is no tenderness.   Musculoskeletal: Normal range of motion. She exhibits no edema or tenderness.   Lymphadenopathy:     She has no cervical adenopathy.   Neurological: She is alert and oriented to person, place, and time. No cranial nerve deficit.   Skin: Skin is warm and dry. She is not diaphoretic.   Psychiatric: She has a normal mood and affect. Her behavior is normal.   Nursing note and vitals reviewed.      Assessment:       No diagnosis found.    Plan:       Problem List Items Addressed This Visit        Pulmonary    Recurrent pneumonia  - stable at this time      Chronic obstructive pulmonary disease - Primary  - continue present medications  - needs to stop smoking, no vaping  - actually seems better on current regimen  - RTC 6 months         Other    Personal history of tobacco use, presenting hazards to health  - d/w pt about the need to stop    Immunoglobulin deficiency  - per Dr Vasquez        Total time spent with patient: 20 minutes including chart review and note  completion.    Each patient to whom he or she provides medical services by telemedicine is:  (1) informed of the relationship between the physician and patient and the respective role of any other health care provider with respect to management of the patient; and (2) notified that he or she may decline to receive medical services by telemedicine and may withdraw from such care at any time.     Darrel Page MD

## 2020-05-19 LAB — IGG SERPL-MCNC: 1047 MG/DL (ref 586–1602)

## 2020-06-08 ENCOUNTER — HOSPITAL ENCOUNTER (OUTPATIENT)
Dept: RADIOLOGY | Facility: HOSPITAL | Age: 50
Discharge: HOME | End: 2020-06-08
Attending: INTERNAL MEDICINE
Payer: MEDICAID

## 2020-06-08 ENCOUNTER — HOSPITAL ENCOUNTER (OUTPATIENT)
Dept: PREADMISSION TESTING | Facility: HOSPITAL | Age: 50
Discharge: HOME | End: 2020-06-08
Attending: INTERNAL MEDICINE
Payer: MEDICAID

## 2020-06-08 VITALS — BODY MASS INDEX: 29.2 KG/M2 | HEIGHT: 68 IN | WEIGHT: 192.69 LBS

## 2020-06-08 DIAGNOSIS — R07.0 PAIN IN THROAT AND CHEST: ICD-10-CM

## 2020-06-08 DIAGNOSIS — R06.09 OTHER FORMS OF DYSPNEA: ICD-10-CM

## 2020-06-08 DIAGNOSIS — R07.9 PAIN IN THROAT AND CHEST: ICD-10-CM

## 2020-06-08 DIAGNOSIS — Z01.810 PRE-PROCEDURAL CARDIOVASCULAR EXAMINATION: Primary | ICD-10-CM

## 2020-06-08 DIAGNOSIS — R07.9 PAIN IN THROAT AND CHEST: Primary | ICD-10-CM

## 2020-06-08 DIAGNOSIS — R07.0 PAIN IN THROAT AND CHEST: Primary | ICD-10-CM

## 2020-06-08 LAB
ALBUMIN SERPL BCP-MCNC: 3.6 G/DL (ref 3.5–5.2)
ALP SERPL-CCNC: 74 U/L (ref 55–135)
ALT SERPL W/O P-5'-P-CCNC: 11 U/L (ref 10–44)
ANION GAP SERPL CALC-SCNC: 9 MMOL/L (ref 8–16)
APTT PPP: 29.3 SEC (ref 23.6–33.3)
AST SERPL-CCNC: 19 U/L (ref 10–40)
BILIRUB SERPL-MCNC: 0.4 MG/DL (ref 0.1–1)
BUN SERPL-MCNC: 14 MG/DL (ref 6–20)
CALCIUM SERPL-MCNC: 8.6 MG/DL (ref 8.7–10.5)
CHLORIDE SERPL-SCNC: 105 MMOL/L (ref 95–110)
CO2 SERPL-SCNC: 23 MMOL/L (ref 23–29)
CREAT SERPL-MCNC: 0.7 MG/DL (ref 0.5–1.4)
ERYTHROCYTE [DISTWIDTH] IN BLOOD BY AUTOMATED COUNT: 13.6 % (ref 11.5–14.5)
EST. GFR  (AFRICAN AMERICAN): >60 ML/MIN/1.73 M^2
EST. GFR  (NON AFRICAN AMERICAN): >60 ML/MIN/1.73 M^2
GLUCOSE SERPL-MCNC: 114 MG/DL (ref 70–110)
HCT VFR BLD AUTO: 37.6 % (ref 37–48.5)
HGB BLD-MCNC: 12.3 G/DL (ref 12–16)
INR PPP: 1
MAGNESIUM SERPL-MCNC: 1.9 MG/DL (ref 1.6–2.6)
MCH RBC QN AUTO: 32.7 PG (ref 27–31)
MCHC RBC AUTO-ENTMCNC: 32.7 G/DL (ref 32–36)
MCV RBC AUTO: 100 FL (ref 82–98)
PLATELET # BLD AUTO: 432 K/UL (ref 150–350)
PMV BLD AUTO: 8.9 FL (ref 9.2–12.9)
POTASSIUM SERPL-SCNC: 4.1 MMOL/L (ref 3.5–5.1)
PROT SERPL-MCNC: 7 G/DL (ref 6–8.4)
PROTHROMBIN TIME: 12.5 SEC (ref 10.6–14.8)
RBC # BLD AUTO: 3.76 M/UL (ref 4–5.4)
SODIUM SERPL-SCNC: 137 MMOL/L (ref 136–145)
WBC # BLD AUTO: 9.24 K/UL (ref 3.9–12.7)

## 2020-06-08 PROCEDURE — 71046 X-RAY EXAM CHEST 2 VIEWS: CPT | Mod: TC

## 2020-06-08 PROCEDURE — 36415 COLL VENOUS BLD VENIPUNCTURE: CPT

## 2020-06-08 PROCEDURE — U0003 INFECTIOUS AGENT DETECTION BY NUCLEIC ACID (DNA OR RNA); SEVERE ACUTE RESPIRATORY SYNDROME CORONAVIRUS 2 (SARS-COV-2) (CORONAVIRUS DISEASE [COVID-19]), AMPLIFIED PROBE TECHNIQUE, MAKING USE OF HIGH THROUGHPUT TECHNOLOGIES AS DESCRIBED BY CMS-2020-01-R: HCPCS

## 2020-06-08 PROCEDURE — 85730 THROMBOPLASTIN TIME PARTIAL: CPT

## 2020-06-08 PROCEDURE — 85027 COMPLETE CBC AUTOMATED: CPT

## 2020-06-08 PROCEDURE — 93005 ELECTROCARDIOGRAM TRACING: CPT | Performed by: INTERNAL MEDICINE

## 2020-06-08 PROCEDURE — 85610 PROTHROMBIN TIME: CPT

## 2020-06-08 PROCEDURE — 80053 COMPREHEN METABOLIC PANEL: CPT

## 2020-06-08 PROCEDURE — 83735 ASSAY OF MAGNESIUM: CPT

## 2020-06-08 RX ORDER — IBUPROFEN 100 MG/5ML
1000 SUSPENSION, ORAL (FINAL DOSE FORM) ORAL DAILY
COMMUNITY
End: 2022-08-15

## 2020-06-08 RX ORDER — CHOLECALCIFEROL (VITAMIN D3) 50 MCG
1 TABLET ORAL DAILY
COMMUNITY
End: 2022-08-15

## 2020-06-08 RX ORDER — LANOLIN ALCOHOL/MO/W.PET/CERES
400 CREAM (GRAM) TOPICAL DAILY
COMMUNITY
End: 2021-08-23

## 2020-06-08 RX ORDER — LANOLIN ALCOHOL/MO/W.PET/CERES
1000 CREAM (GRAM) TOPICAL DAILY
COMMUNITY
End: 2021-08-23

## 2020-06-08 NOTE — DISCHARGE INSTRUCTIONS
 Nothing to eat or drink after midnight the night before your procedure.   Do not take any medications the morning of your procedure   Bring all your medications with you in the original pill bottles from pharmacy.   If you take blood thinners, ask your doctor if you should stop taking them.   Do not take metformin 24 hours prior to your procedure.   Do your Hibiclens wash the night before and morning of your procedure.   If you use a CPAP or BiPAP at home, please bring it with you the day of your procedure.   Make arrangements for someone you know to drive you home after your procedure. Taxi and Uber are not acceptable.     Tuesday 06/09/2020 05:30AM

## 2020-06-09 ENCOUNTER — HOSPITAL ENCOUNTER (OUTPATIENT)
Facility: HOSPITAL | Age: 50
Discharge: HOME | End: 2020-06-09
Attending: INTERNAL MEDICINE | Admitting: INTERNAL MEDICINE
Payer: MEDICAID

## 2020-06-09 VITALS
RESPIRATION RATE: 20 BRPM | HEART RATE: 80 BPM | TEMPERATURE: 98 F | DIASTOLIC BLOOD PRESSURE: 75 MMHG | SYSTOLIC BLOOD PRESSURE: 126 MMHG | OXYGEN SATURATION: 93 %

## 2020-06-09 DIAGNOSIS — R07.9 CHEST PAIN, UNSPECIFIED TYPE: ICD-10-CM

## 2020-06-09 DIAGNOSIS — R06.02 SOB (SHORTNESS OF BREATH): ICD-10-CM

## 2020-06-09 LAB — SARS-COV-2 RNA RESP QL NAA+PROBE: NOT DETECTED

## 2020-06-09 PROCEDURE — C1894 INTRO/SHEATH, NON-LASER: HCPCS | Performed by: INTERNAL MEDICINE

## 2020-06-09 PROCEDURE — C1769 GUIDE WIRE: HCPCS | Performed by: INTERNAL MEDICINE

## 2020-06-09 PROCEDURE — 25000003 PHARM REV CODE 250: Performed by: INTERNAL MEDICINE

## 2020-06-09 PROCEDURE — 99152 MOD SED SAME PHYS/QHP 5/>YRS: CPT | Performed by: INTERNAL MEDICINE

## 2020-06-09 PROCEDURE — 63600175 PHARM REV CODE 636 W HCPCS: Performed by: INTERNAL MEDICINE

## 2020-06-09 PROCEDURE — 25500020 PHARM REV CODE 255: Performed by: INTERNAL MEDICINE

## 2020-06-09 PROCEDURE — 99153 MOD SED SAME PHYS/QHP EA: CPT | Performed by: INTERNAL MEDICINE

## 2020-06-09 PROCEDURE — 93458 L HRT ARTERY/VENTRICLE ANGIO: CPT

## 2020-06-09 PROCEDURE — C1887 CATHETER, GUIDING: HCPCS | Performed by: INTERNAL MEDICINE

## 2020-06-09 RX ORDER — SODIUM CHLORIDE 9 MG/ML
INJECTION, SOLUTION INTRAVENOUS CONTINUOUS
Status: DISCONTINUED | OUTPATIENT
Start: 2020-06-09 | End: 2020-06-09 | Stop reason: HOSPADM

## 2020-06-09 RX ORDER — HYDROMORPHONE HYDROCHLORIDE 1 MG/ML
INJECTION, SOLUTION INTRAMUSCULAR; INTRAVENOUS; SUBCUTANEOUS
Status: DISCONTINUED | OUTPATIENT
Start: 2020-06-09 | End: 2020-06-09 | Stop reason: HOSPADM

## 2020-06-09 RX ORDER — VERAPAMIL HYDROCHLORIDE 2.5 MG/ML
INJECTION, SOLUTION INTRAVENOUS
Status: DISCONTINUED | OUTPATIENT
Start: 2020-06-09 | End: 2020-06-09 | Stop reason: HOSPADM

## 2020-06-09 RX ORDER — HEPARIN SODIUM 1000 [USP'U]/ML
INJECTION, SOLUTION INTRAVENOUS; SUBCUTANEOUS
Status: DISCONTINUED | OUTPATIENT
Start: 2020-06-09 | End: 2020-06-09 | Stop reason: HOSPADM

## 2020-06-09 RX ORDER — MIDAZOLAM HYDROCHLORIDE 1 MG/ML
INJECTION INTRAMUSCULAR; INTRAVENOUS
Status: DISCONTINUED | OUTPATIENT
Start: 2020-06-09 | End: 2020-06-09 | Stop reason: HOSPADM

## 2020-06-09 RX ORDER — LIDOCAINE HYDROCHLORIDE 10 MG/ML
INJECTION, SOLUTION EPIDURAL; INFILTRATION; INTRACAUDAL; PERINEURAL
Status: DISCONTINUED | OUTPATIENT
Start: 2020-06-09 | End: 2020-06-09 | Stop reason: HOSPADM

## 2020-06-09 RX ORDER — FENTANYL CITRATE 50 UG/ML
INJECTION, SOLUTION INTRAMUSCULAR; INTRAVENOUS
Status: DISCONTINUED | OUTPATIENT
Start: 2020-06-09 | End: 2020-06-09 | Stop reason: HOSPADM

## 2020-06-09 RX ORDER — NITROGLYCERIN 5 MG/ML
INJECTION, SOLUTION INTRAVENOUS
Status: DISCONTINUED | OUTPATIENT
Start: 2020-06-09 | End: 2020-06-09 | Stop reason: HOSPADM

## 2020-06-09 RX ADMIN — SODIUM CHLORIDE: 0.9 INJECTION, SOLUTION INTRAVENOUS at 06:06

## 2020-06-09 NOTE — Clinical Note
Angiography performed of the right coronary arteries in multiple views. Angiography performed via power injection with 6 mL contrast at 3 mL/sPower injection PSI was 200..

## 2020-06-09 NOTE — DISCHARGE SUMMARY
[facility]  Cardiology  Discharge Summary      Patient Name: Araceli Ibarra  MRN: [id number]  Admission Date: 6/9/2020  Hospital Length of Stay: 0 days  Discharge Date and Time:  06/09/2020 8:07 AM  Attending Physician: Edis Peñaloza MD  Discharging Provider: Edis Peñaloza MD  Primary Care Physician: Mayank Schwartz MD    HPI:  49-year-old female seen in clinic complaining of persistent chest pain.  She had multiple risk factors for CAD and refractory chest pain the medication.  We therefore scheduled for her outpatient left heart catheterization with possible percutaneous intervention of the coronary    Procedure(s) (LRB):  CATHETERIZATION, HEART, LEFT (Left)     Indwelling Lines/Drains at time of discharge: none  Lines/Drains/Airways     None                 Hospital Course (synopsis of major diagnoses, care, treatment, and services provided during the course of the hospital stay):  Patient underwent diagnostic left heart catheterization which revealed moderate focal 50-60% lad stenosis otherwise clean coronaries.  She had normal left ventricular systolic and diastolic function EF 60% with normal LVEDP.  She tolerated the procedure well and was stable and ready for discharge after 2 hr of recovery and [facility].    Consults: none    Significant Diagnostic Studies: Angiography:  See report dictation    Pending Diagnostic Studies:     None          Final Active Diagnoses:    Diagnosis Date Noted POA    SOB (shortness of breath) [R06.02] 06/09/2020 Yes    Chest pain [R07.9] 06/09/2020 Unknown      Problems Resolved During this Admission:       Discharged Condition: good    Follow Up: 1 week  Follow-up Information     Edis Peñaloza MD In 1 week.    Specialty:  Cardiology  Contact information:  [address] [place] [address]  2ND FLOOR  [facility] [address]  [phone]                 Patient Instructions:      Call MD for:  severe uncontrolled pain     Call MD for:  redness, tenderness,  or signs of infection (pain, swelling, redness, odor or green/yellow discharge around incision site)     Remove dressing in 24 hours     Activity as tolerated     Medications:  Reconciled Home Medications:      Medication List      CHANGE how you take these medications    DALIRESP 500 mcg Tab  Generic drug:  roflumilast  TAKE ONE TABLET BY MOUTH ONCE DAILY  What changed:  how much to take     diphenhydrAMINE 25 mg tablet  Commonly known as:  BENADRYL ALLERGY  Take 1 tablet (25 mg total) by mouth nightly as needed for Itching.  What changed:  reasons to take this     immun glob G(IgG)-pro-IgA 0-50 10 gram/50 mL (20 %) Soln  Commonly known as:  HIZENTRA  15 grams weekly subcutaneously  What changed:    · how much to take  · how to take this  · when to take this  · additional instructions     lidocaine-prilocaine cream  Commonly known as:  EMLA  Apply topically as needed.  What changed:    · how much to take  · reasons to take this     TRELEGY ELLIPTA 100-62.5-25 mcg Dsdv  Generic drug:  fluticasone-umeclidin-vilanter  INHALE 1 PUFF INTO THE LUNGS EVERY DAY  What changed:  See the new instructions.        CONTINUE taking these medications    * albuterol 2.5 mg /3 mL (0.083 %) nebulizer solution  Commonly known as:  PROVENTIL  Take 3 mLs (2.5 mg total) by nebulization every 6 (six) hours as needed for Wheezing. Rescue     * albuterol 90 mcg/actuation inhaler  Commonly known as:  PROVENTIL/VENTOLIN HFA  Inhale 2 puffs into the lungs every 6 (six) hours as needed. Rescue     amitriptyline 75 MG tablet  Commonly known as:  ELAVIL  Take 75 mg by mouth every evening.     baclofen 20 MG tablet  Commonly known as:  LIORESAL  Take 20 mg by mouth 3 (three) times daily.     cholecalciferol (vitamin D3) 50 mcg (2,000 unit) Tab  Commonly known as:  VITAMIN D3  Take 1 tablet by mouth once daily.     gabapentin 800 MG tablet  Commonly known as:  NEURONTIN  Take 800 mg by mouth 4 (four) times daily.     GLUCOSAMINE MSM ORAL  Take 1  tablet by mouth once daily.     HYDROcodone-acetaminophen  mg per tablet  Commonly known as:  NORCO  Take 1 tablet by mouth 3 (three) times daily.     KRILL OIL ORAL  Take 350 mg by mouth once daily.     levothyroxine 100 mcg Cap  Commonly known as:  TIROSINT  Take 100 mcg by mouth once daily.     lidocaine 5 %  Commonly known as:  LIDODERM  Place 2 patches onto the skin once daily.     magnesium oxide 400 mg (241.3 mg magnesium) tablet  Commonly known as:  MAG-OX  Take 400 mg by mouth once daily.     meloxicam 7.5 MG tablet  Commonly known as:  MOBIC  Take 7.5 mg by mouth 2 (two) times a day.     metoprolol succinate 25 MG 24 hr tablet  Commonly known as:  TOPROL-XL  Take 25 mg by mouth once daily.     morphine 15 MG 12 hr tablet  Commonly known as:  MS CONTIN  Take 15 mg by mouth 2 (two) times daily.     multivitamin tablet  Commonly known as:  THERAGRAN  Take 1 tablet by mouth once daily.     QUEtiapine 200 MG Tab  Commonly known as:  SEROQUEL  Take 200 mg by mouth every evening.     RANEXA 1,000 mg Tb12  Generic drug:  ranolazine  Take 1,000 mg by mouth 2 (two) times daily.     rosuvastatin 20 MG tablet  Commonly known as:  CRESTOR  Take 20 mg by mouth every evening.     sumatriptan 25 MG Tab  Commonly known as:  IMITREX  Take 25 mg by mouth daily as needed.     temazepam 30 mg capsule  Commonly known as:  RESTORIL  Take 30 mg by mouth every evening.     VITAMIN B-12 1000 MCG tablet  Generic drug:  cyanocobalamin  Take 1,000 mcg by mouth once daily.     VITAMIN C WITH MIRLANDE HIPS 1000 MG tablet  Generic drug:  ascorbic acid (vitamin C)  Take 1,000 mg by mouth once daily.         * This list has 2 medication(s) that are the same as other medications prescribed for you. Read the directions carefully, and ask your doctor or other care provider to review them with you.                Time spent on the discharge of patient: <30 minutes    Edis Peñaloza MD  Cardiology  [facility]

## 2020-06-09 NOTE — Clinical Note
Catheter is repositioned to the ostium   left main. Angiography performed of the left coronary arteries in multiple views. Angiography performed via power injection with 8 mL contrast at 4 mL/sPower injection PSI was 400..

## 2020-06-09 NOTE — INTERVAL H&P NOTE
The patient has been examined and the H&P has been reviewed:    I concur with the findings and no changes have occurred since H&P was written.    Anesthesia/Surgery risks, benefits and alternative options discussed and understood by patient/family.          Active Hospital Problems    Diagnosis  POA    SOB (shortness of breath) [R06.02]  Yes     Priority: High      Resolved Hospital Problems   No resolved problems to display.

## 2020-06-10 LAB
CATH EF ESTIMATED: 60 %
CATH EF QUANTITATIVE: 60 %

## 2020-11-11 ENCOUNTER — OFFICE VISIT (OUTPATIENT)
Dept: PULMONOLOGY | Facility: CLINIC | Age: 50
End: 2020-11-11
Payer: MEDICAID

## 2020-11-11 DIAGNOSIS — R05.9 COUGH: ICD-10-CM

## 2020-11-11 DIAGNOSIS — R06.02 SOB (SHORTNESS OF BREATH): ICD-10-CM

## 2020-11-11 DIAGNOSIS — J43.9 PULMONARY EMPHYSEMA, UNSPECIFIED EMPHYSEMA TYPE: Primary | ICD-10-CM

## 2020-11-11 DIAGNOSIS — Z87.891 PERSONAL HISTORY OF TOBACCO USE, PRESENTING HAZARDS TO HEALTH: ICD-10-CM

## 2020-11-11 DIAGNOSIS — J18.9 RECURRENT PNEUMONIA: ICD-10-CM

## 2020-11-11 PROBLEM — R06.2 WHEEZING ON AUSCULTATION: Status: RESOLVED | Noted: 2019-07-24 | Resolved: 2020-11-11

## 2020-11-11 PROBLEM — R05.8 PRODUCTIVE COUGH: Status: RESOLVED | Noted: 2019-04-02 | Resolved: 2020-11-11

## 2020-11-11 PROCEDURE — 99214 PR OFFICE/OUTPT VISIT, EST, LEVL IV, 30-39 MIN: ICD-10-PCS | Mod: S$GLB,,, | Performed by: INTERNAL MEDICINE

## 2020-11-11 PROCEDURE — 99214 OFFICE O/P EST MOD 30 MIN: CPT | Mod: S$GLB,,, | Performed by: INTERNAL MEDICINE

## 2021-01-26 ENCOUNTER — TELEPHONE (OUTPATIENT)
Dept: ALLERGY | Facility: CLINIC | Age: 51
End: 2021-01-26

## 2021-02-23 ENCOUNTER — OFFICE VISIT (OUTPATIENT)
Dept: ALLERGY | Facility: CLINIC | Age: 51
End: 2021-02-23
Payer: MEDICAID

## 2021-02-23 VITALS
WEIGHT: 188 LBS | HEART RATE: 109 BPM | BODY MASS INDEX: 28.49 KG/M2 | HEIGHT: 68 IN | TEMPERATURE: 98 F | OXYGEN SATURATION: 97 % | SYSTOLIC BLOOD PRESSURE: 130 MMHG | DIASTOLIC BLOOD PRESSURE: 87 MMHG

## 2021-02-23 DIAGNOSIS — F43.9 STRESS AT HOME: ICD-10-CM

## 2021-02-23 DIAGNOSIS — J18.9 RECURRENT PNEUMONIA: ICD-10-CM

## 2021-02-23 DIAGNOSIS — Z90.81 HISTORY OF SPLENECTOMY: ICD-10-CM

## 2021-02-23 DIAGNOSIS — D80.6 DEFICIENCY OF ANTI-PNEUMOCOCCAL POLYSACCHARIDE ANTIBODY: Primary | ICD-10-CM

## 2021-02-23 DIAGNOSIS — D80.1 HYPOGAMMAGLOBULINEMIA: ICD-10-CM

## 2021-02-23 DIAGNOSIS — J43.8 OTHER EMPHYSEMA: ICD-10-CM

## 2021-02-23 PROCEDURE — 99214 OFFICE O/P EST MOD 30 MIN: CPT | Mod: S$GLB,,, | Performed by: ALLERGY & IMMUNOLOGY

## 2021-02-23 PROCEDURE — 99214 PR OFFICE/OUTPT VISIT, EST, LEVL IV, 30-39 MIN: ICD-10-PCS | Mod: S$GLB,,, | Performed by: ALLERGY & IMMUNOLOGY

## 2021-02-23 RX ORDER — HUMAN IMMUNOGLOBULIN G 0.2 G/ML
16 LIQUID SUBCUTANEOUS WEEKLY
Qty: 320 ML | Refills: 11 | Status: SHIPPED | OUTPATIENT
Start: 2021-02-23 | End: 2022-02-23 | Stop reason: SDUPTHER

## 2021-03-09 ENCOUNTER — IMMUNIZATION (OUTPATIENT)
Dept: PRIMARY CARE CLINIC | Facility: CLINIC | Age: 51
End: 2021-03-09
Payer: MEDICAID

## 2021-03-09 DIAGNOSIS — Z23 NEED FOR VACCINATION: Primary | ICD-10-CM

## 2021-03-09 PROCEDURE — 0001A COVID-19, MRNA, LNP-S, PF, 30 MCG/0.3 ML DOSE VACCINE: ICD-10-PCS | Mod: CV19,S$GLB,, | Performed by: FAMILY MEDICINE

## 2021-03-09 PROCEDURE — 91300 COVID-19, MRNA, LNP-S, PF, 30 MCG/0.3 ML DOSE VACCINE: ICD-10-PCS | Mod: S$GLB,,, | Performed by: FAMILY MEDICINE

## 2021-03-09 PROCEDURE — 0001A COVID-19, MRNA, LNP-S, PF, 30 MCG/0.3 ML DOSE VACCINE: CPT | Mod: CV19,S$GLB,, | Performed by: FAMILY MEDICINE

## 2021-03-09 PROCEDURE — 91300 COVID-19, MRNA, LNP-S, PF, 30 MCG/0.3 ML DOSE VACCINE: CPT | Mod: S$GLB,,, | Performed by: FAMILY MEDICINE

## 2021-03-15 ENCOUNTER — TELEPHONE (OUTPATIENT)
Dept: PULMONOLOGY | Facility: CLINIC | Age: 51
End: 2021-03-15

## 2021-03-15 DIAGNOSIS — R07.9 CHEST PAIN, UNSPECIFIED TYPE: ICD-10-CM

## 2021-03-24 ENCOUNTER — TELEPHONE (OUTPATIENT)
Dept: ALLERGY | Facility: CLINIC | Age: 51
End: 2021-03-24

## 2021-03-24 ENCOUNTER — TELEPHONE (OUTPATIENT)
Dept: PULMONOLOGY | Facility: CLINIC | Age: 51
End: 2021-03-24

## 2021-03-24 DIAGNOSIS — R07.9 CHEST PAIN, UNSPECIFIED TYPE: Primary | ICD-10-CM

## 2021-03-30 ENCOUNTER — IMMUNIZATION (OUTPATIENT)
Dept: PRIMARY CARE CLINIC | Facility: CLINIC | Age: 51
End: 2021-03-30
Payer: MEDICAID

## 2021-03-30 DIAGNOSIS — Z23 NEED FOR VACCINATION: Primary | ICD-10-CM

## 2021-03-30 PROCEDURE — 91300 COVID-19, MRNA, LNP-S, PF, 30 MCG/0.3 ML DOSE VACCINE: CPT | Mod: S$GLB,,, | Performed by: FAMILY MEDICINE

## 2021-03-30 PROCEDURE — 0002A COVID-19, MRNA, LNP-S, PF, 30 MCG/0.3 ML DOSE VACCINE: CPT | Mod: CV19,S$GLB,, | Performed by: FAMILY MEDICINE

## 2021-03-30 PROCEDURE — 91300 COVID-19, MRNA, LNP-S, PF, 30 MCG/0.3 ML DOSE VACCINE: ICD-10-PCS | Mod: S$GLB,,, | Performed by: FAMILY MEDICINE

## 2021-03-30 PROCEDURE — 0002A COVID-19, MRNA, LNP-S, PF, 30 MCG/0.3 ML DOSE VACCINE: ICD-10-PCS | Mod: CV19,S$GLB,, | Performed by: FAMILY MEDICINE

## 2021-05-12 ENCOUNTER — OFFICE VISIT (OUTPATIENT)
Dept: PULMONOLOGY | Facility: CLINIC | Age: 51
End: 2021-05-12
Payer: MEDICAID

## 2021-05-12 VITALS
SYSTOLIC BLOOD PRESSURE: 132 MMHG | WEIGHT: 173 LBS | HEART RATE: 63 BPM | DIASTOLIC BLOOD PRESSURE: 80 MMHG | BODY MASS INDEX: 26.3 KG/M2 | OXYGEN SATURATION: 92 %

## 2021-05-12 DIAGNOSIS — Z87.891 PERSONAL HISTORY OF TOBACCO USE, PRESENTING HAZARDS TO HEALTH: ICD-10-CM

## 2021-05-12 DIAGNOSIS — F43.9 STRESS AT HOME: Primary | ICD-10-CM

## 2021-05-12 DIAGNOSIS — R06.02 SOB (SHORTNESS OF BREATH): ICD-10-CM

## 2021-05-12 DIAGNOSIS — R05.9 COUGH: ICD-10-CM

## 2021-05-12 DIAGNOSIS — J43.8 OTHER EMPHYSEMA: ICD-10-CM

## 2021-05-12 PROCEDURE — 99214 OFFICE O/P EST MOD 30 MIN: CPT | Mod: S$GLB,,, | Performed by: INTERNAL MEDICINE

## 2021-05-12 PROCEDURE — 99214 PR OFFICE/OUTPT VISIT, EST, LEVL IV, 30-39 MIN: ICD-10-PCS | Mod: S$GLB,,, | Performed by: INTERNAL MEDICINE

## 2021-05-27 ENCOUNTER — HOSPITAL ENCOUNTER (OUTPATIENT)
Dept: PULMONOLOGY | Facility: HOSPITAL | Age: 51
Discharge: HOME | End: 2021-05-27
Attending: INTERNAL MEDICINE
Payer: MEDICAID

## 2021-05-27 ENCOUNTER — HOSPITAL ENCOUNTER (OUTPATIENT)
Dept: RADIOLOGY | Facility: HOSPITAL | Age: 51
Discharge: HOME | End: 2021-05-27
Attending: INTERNAL MEDICINE
Payer: MEDICAID

## 2021-05-27 DIAGNOSIS — R07.9 CHEST PAIN, UNSPECIFIED TYPE: ICD-10-CM

## 2021-05-27 DIAGNOSIS — J43.8 OTHER EMPHYSEMA: ICD-10-CM

## 2021-05-27 PROCEDURE — 94727 GAS DIL/WSHOT DETER LNG VOL: CPT

## 2021-05-27 PROCEDURE — 71046 X-RAY EXAM CHEST 2 VIEWS: CPT | Mod: TC

## 2021-05-27 PROCEDURE — 94729 DIFFUSING CAPACITY: CPT

## 2021-05-27 PROCEDURE — 94010 BREATHING CAPACITY TEST: CPT

## 2021-06-03 ENCOUNTER — TELEPHONE (OUTPATIENT)
Dept: PULMONOLOGY | Facility: CLINIC | Age: 51
End: 2021-06-03

## 2021-06-03 RX ORDER — AZITHROMYCIN 250 MG/1
TABLET, FILM COATED ORAL
Qty: 6 TABLET | Refills: 0 | Status: SHIPPED | OUTPATIENT
Start: 2021-06-03 | End: 2022-02-23

## 2021-06-17 ENCOUNTER — TELEPHONE (OUTPATIENT)
Dept: ALLERGY | Facility: CLINIC | Age: 51
End: 2021-06-17

## 2021-08-23 ENCOUNTER — OFFICE VISIT (OUTPATIENT)
Dept: ALLERGY | Facility: CLINIC | Age: 51
End: 2021-08-23
Payer: MEDICAID

## 2021-08-23 VITALS
HEIGHT: 68 IN | BODY MASS INDEX: 25.76 KG/M2 | SYSTOLIC BLOOD PRESSURE: 144 MMHG | HEART RATE: 73 BPM | OXYGEN SATURATION: 99 % | WEIGHT: 170 LBS | DIASTOLIC BLOOD PRESSURE: 80 MMHG | TEMPERATURE: 98 F

## 2021-08-23 DIAGNOSIS — D80.6 ANTI-POLYSACCHARIDE ANTIBODY DEFICIENCY: Primary | ICD-10-CM

## 2021-08-23 PROCEDURE — 99213 PR OFFICE/OUTPT VISIT, EST, LEVL III, 20-29 MIN: ICD-10-PCS | Mod: S$GLB,,, | Performed by: ALLERGY & IMMUNOLOGY

## 2021-08-23 PROCEDURE — 99213 OFFICE O/P EST LOW 20 MIN: CPT | Mod: S$GLB,,, | Performed by: ALLERGY & IMMUNOLOGY

## 2021-08-23 RX ORDER — OXCARBAZEPINE 150 MG/1
TABLET, FILM COATED ORAL
COMMUNITY
Start: 2021-08-09 | End: 2022-02-23

## 2021-08-23 RX ORDER — NITROGLYCERIN 0.4 MG/1
TABLET SUBLINGUAL
COMMUNITY
Start: 2021-04-29

## 2021-08-23 RX ORDER — ASPIRIN 81 MG/1
TABLET ORAL
COMMUNITY

## 2021-08-23 RX ORDER — AMITRIPTYLINE HYDROCHLORIDE 100 MG/1
TABLET ORAL
COMMUNITY
Start: 2021-02-05

## 2021-08-23 RX ORDER — ESCITALOPRAM OXALATE 20 MG/1
TABLET ORAL
COMMUNITY
Start: 2021-07-26 | End: 2022-02-23

## 2021-11-10 ENCOUNTER — OFFICE VISIT (OUTPATIENT)
Dept: PULMONOLOGY | Facility: CLINIC | Age: 51
End: 2021-11-10
Payer: MEDICAID

## 2021-11-10 VITALS
SYSTOLIC BLOOD PRESSURE: 110 MMHG | OXYGEN SATURATION: 99 % | BODY MASS INDEX: 28.28 KG/M2 | HEART RATE: 80 BPM | WEIGHT: 186 LBS | DIASTOLIC BLOOD PRESSURE: 72 MMHG

## 2021-11-10 DIAGNOSIS — J18.9 RECURRENT PNEUMONIA: ICD-10-CM

## 2021-11-10 DIAGNOSIS — J43.8 OTHER EMPHYSEMA: ICD-10-CM

## 2021-11-10 DIAGNOSIS — R05.9 COUGH: ICD-10-CM

## 2021-11-10 DIAGNOSIS — F43.9 STRESS AT HOME: Primary | ICD-10-CM

## 2021-11-10 PROCEDURE — 99214 PR OFFICE/OUTPT VISIT, EST, LEVL IV, 30-39 MIN: ICD-10-PCS | Mod: S$GLB,,, | Performed by: INTERNAL MEDICINE

## 2021-11-10 PROCEDURE — 99214 OFFICE O/P EST MOD 30 MIN: CPT | Mod: S$GLB,,, | Performed by: INTERNAL MEDICINE

## 2021-11-10 RX ORDER — ALBUTEROL SULFATE 90 UG/1
2 AEROSOL, METERED RESPIRATORY (INHALATION) EVERY 6 HOURS PRN
Qty: 18 G | Refills: 5 | Status: SHIPPED | OUTPATIENT
Start: 2021-11-10

## 2021-11-10 RX ORDER — ALBUTEROL SULFATE 0.83 MG/ML
2.5 SOLUTION RESPIRATORY (INHALATION) EVERY 6 HOURS PRN
Qty: 100 EACH | Refills: 6 | Status: SHIPPED | OUTPATIENT
Start: 2021-11-10 | End: 2022-08-15

## 2022-01-13 ENCOUNTER — LAB VISIT (OUTPATIENT)
Dept: PRIMARY CARE CLINIC | Facility: OTHER | Age: 52
End: 2022-01-13
Attending: INTERNAL MEDICINE
Payer: MEDICAID

## 2022-01-13 DIAGNOSIS — Z20.822 ENCOUNTER FOR LABORATORY TESTING FOR COVID-19 VIRUS: ICD-10-CM

## 2022-01-13 PROCEDURE — U0003 INFECTIOUS AGENT DETECTION BY NUCLEIC ACID (DNA OR RNA); SEVERE ACUTE RESPIRATORY SYNDROME CORONAVIRUS 2 (SARS-COV-2) (CORONAVIRUS DISEASE [COVID-19]), AMPLIFIED PROBE TECHNIQUE, MAKING USE OF HIGH THROUGHPUT TECHNOLOGIES AS DESCRIBED BY CMS-2020-01-R: HCPCS | Performed by: INTERNAL MEDICINE

## 2022-01-14 LAB
SARS-COV-2 RNA RESP QL NAA+PROBE: NOT DETECTED
SARS-COV-2- CYCLE NUMBER: NORMAL

## 2022-02-23 ENCOUNTER — OFFICE VISIT (OUTPATIENT)
Dept: ALLERGY | Facility: CLINIC | Age: 52
End: 2022-02-23
Payer: MEDICAID

## 2022-02-23 VITALS
HEART RATE: 101 BPM | TEMPERATURE: 97 F | HEIGHT: 68 IN | SYSTOLIC BLOOD PRESSURE: 126 MMHG | DIASTOLIC BLOOD PRESSURE: 84 MMHG | WEIGHT: 177 LBS | OXYGEN SATURATION: 97 % | BODY MASS INDEX: 26.83 KG/M2

## 2022-02-23 DIAGNOSIS — D80.1 HYPOGAMMAGLOBULINEMIA: ICD-10-CM

## 2022-02-23 DIAGNOSIS — D80.6 DEFICIENCY OF ANTI-PNEUMOCOCCAL POLYSACCHARIDE ANTIBODY: Primary | ICD-10-CM

## 2022-02-23 PROCEDURE — 99213 PR OFFICE/OUTPT VISIT, EST, LEVL III, 20-29 MIN: ICD-10-PCS | Mod: S$GLB,,, | Performed by: ALLERGY & IMMUNOLOGY

## 2022-02-23 PROCEDURE — 3079F PR MOST RECENT DIASTOLIC BLOOD PRESSURE 80-89 MM HG: ICD-10-PCS | Mod: S$GLB,,, | Performed by: ALLERGY & IMMUNOLOGY

## 2022-02-23 PROCEDURE — 99213 OFFICE O/P EST LOW 20 MIN: CPT | Mod: S$GLB,,, | Performed by: ALLERGY & IMMUNOLOGY

## 2022-02-23 PROCEDURE — 3008F BODY MASS INDEX DOCD: CPT | Mod: S$GLB,,, | Performed by: ALLERGY & IMMUNOLOGY

## 2022-02-23 PROCEDURE — 3008F PR BODY MASS INDEX (BMI) DOCUMENTED: ICD-10-PCS | Mod: S$GLB,,, | Performed by: ALLERGY & IMMUNOLOGY

## 2022-02-23 PROCEDURE — 3079F DIAST BP 80-89 MM HG: CPT | Mod: S$GLB,,, | Performed by: ALLERGY & IMMUNOLOGY

## 2022-02-23 PROCEDURE — 1160F RVW MEDS BY RX/DR IN RCRD: CPT | Mod: S$GLB,,, | Performed by: ALLERGY & IMMUNOLOGY

## 2022-02-23 PROCEDURE — 3074F PR MOST RECENT SYSTOLIC BLOOD PRESSURE < 130 MM HG: ICD-10-PCS | Mod: S$GLB,,, | Performed by: ALLERGY & IMMUNOLOGY

## 2022-02-23 PROCEDURE — 1160F PR REVIEW ALL MEDS BY PRESCRIBER/CLIN PHARMACIST DOCUMENTED: ICD-10-PCS | Mod: S$GLB,,, | Performed by: ALLERGY & IMMUNOLOGY

## 2022-02-23 PROCEDURE — 3074F SYST BP LT 130 MM HG: CPT | Mod: S$GLB,,, | Performed by: ALLERGY & IMMUNOLOGY

## 2022-02-23 RX ORDER — HUMAN IMMUNOGLOBULIN G 0.2 G/ML
16 LIQUID SUBCUTANEOUS WEEKLY
Qty: 320 ML | Refills: 12 | Status: SHIPPED | OUTPATIENT
Start: 2022-02-23 | End: 2023-02-09 | Stop reason: SDUPTHER

## 2022-02-23 NOTE — PROGRESS NOTES
"Subjective:       Patient ID: Araceil Ibarra is a 51 y.o. female.    Chief Complaint: Immunodeficiency (Has been getting some colds, worst over Gopal. Hizentra has kept her doing well.)    HPI     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    Her last visit was 8/2021     No antibiotics since her last visit.     She is doing well on her increased hizentra dose. 16 grams once per week.      SAD - doing well.   as she did not respond to her pneumonia vaccine.   This is in addition to her hypogam.   Currently infusing 16 grams of hizentra weekly sub q and feels improvement being on the IgG replacement therapy.   600 mg/kg/ month.   She is with [email or web address] pharmacy. Receives shipments on time.   Mainly se are fatigue. Can have headaches with infusions.   No headaches with increase in dose.   Needs longer needles had leaking in her legs. Most recent dose.   Infuses in legs and stomach, rotates sites.   Possible 9 mm needle, discussed 12 -14 mm needle. Still has leakage with infusions in legs.   Duration is 1 hour.     Abx:  None since last visit.   Feels that hizentra has stopped pna and the "stabbing pain."    She was given z pack for her pneumonia 2/2019.  She has then had an increase in her hizentra to 14 grams weekly in November 2019.  She had doxycycline 11/2019 and azithromycin 12/2019.  increase 13 grams weekly 5/2019 hizentra due to pneumonia and given abx.   Recurrent infections Onset: 19 yrs old.   Pna: 2 per year since she was 19 yrs old. - recently had azithro for cap outpt . Per report   Sinusitis: none   Otitis: none   She is still having symptoms of cough and mucus production. Always productive from yellow to brown.   She does endorse emphysema as well that may contribute towards proclivity for infection.     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 Has had recent ppv 23.     Labs:  IgG, Total Serum         "      625 L                   700-1600  mg/dL         Region 6 panel: negative     Component      Latest Ref Rng & Units 6/7/2018 5/24/2018   IgE      0 - 100 IU/mL  11   Immunoglobulin G      700 - 1,600 mg/dL 626 (L)    Immunoglobulin A (IgA)      87 - 352 mg/dL 172    Immunoglobulin M      26 - 217 mg/dL 80      Component      Latest Ref Rng & Units 5/7/2019   IgG, Serum      700 - 1600 mg/dL 1107       Component      Latest Ref Rng & Units 6/7/2018   Streptococcus pneumoniae 1 Ab IgG      >1.3 ug/mL <0.1 (L)   Streptococcus pneumoniae 3 Ab IgG      >1.3 ug/mL 1.2 (L)   Streptococcus pneumoniae 4 Ab IgG      >1.3 ug/mL 0.1 (L)   Streptococcus pneumoniae 8 Ab IgG      >1.3 ug/mL 1.4   Streptococcus pneumoniae 9 Ab IgG      >1.3 ug/mL 0.2 (L)   Streptococcus pneumoniae 12 Ab IgG      >1.3 ug/mL <0.1 (L)   Streptococcus pneumoniae 14 Ab IgG      >1.3 ug/mL 22.5   Streptococcus pneumoniae 19 Ab IgG      >1.3 ug/mL 6.1   Streptococcus pneumoniae 23 Ab IgG      >1.3 ug/mL <0.1 (L)   Streptococcus pneumoniae 26 Ab IgG      >1.3 ug/mL 3.7   Streptococcus pneumoniae 51 Ab IgG      >1.3 ug/mL 0.1 (L)   Streptococcus pneumoniae 56 Ab IgG      >1.3 ug/mL 1.8   Streptococcus pneumoniae 57 Ab IgG      >1.3 ug/mL 1.3 (L)   Streptococcus pneumoniae 68 Ab IgG      >1.3 ug/mL 1.4     Component      Latest Ref Rng & Units 8/31/2018   PNEUMO AB TYPE 5 AVIDITY      AI CANCELED   Pneumo Ab Type 68(9V) Avidity      AI 1.2   PNEUMO AB TYPE 1 AVIDITY      AI TNP   PNEUMO AB TYPE 3 AVIDITY      AI <0.3   Pneumo Ab Type 4 Avidity      AI TNP   Pneumo Ab Type 26(6B) Avidity      AI <0.3   Pneumo Ab Type 8 Avidity      AI 0.7   Pneumo Ab Type 9(9N) Avidity      AI <0.3   Pneumo Ab Type 12(12F)Avidity      AI TNP   Pneumo Ab Type 14 Avidity      AI 0.8   Pneumo Ab Type 19(19F)Avidity      AI <0.3   Pneumo Ab Type 23(23F)Avidity      AI TNP   Pneumo Ab Type 51(7F) Avidity      AI 1.2   Pneumo Ab Type 56(18C)Avidity      AI 0.4       She did not  "respond to her vaccine.     Review of Systems      General: neg unexpected weight changes, fevers, chills, night sweats, malaise  HEENT: see hpi, Neg eye pain, vision changes, ear drainage, nose bleeds, throat tightness, sores in the mouth  CV: Neg chest pain, palpitations, swelling  Resp: see hpi, neg shortness of breath, hemoptysis  GI: see hpi, neg dysphagia, night abdominal pain, reflux, chronic diarrhea, chronic constipation  Derm: See Hpi, neg new rash, neg flushing  Mu/sk: Neg joint pain, joint swelling   Psych: Neg anxiety  neuro: neg chronic headaches, muscle weakness  Endo: +chronic fatigue + heat intolerance and sweating neg cold     Objective:     Vitals:    02/23/22 1123   BP: 126/84   Pulse: 101   Temp: 97.3 °F (36.3 °C)   SpO2: 97%   Weight: 80.3 kg (177 lb)   Height: 5' 8" (1.727 m)     No peak flow due to pandemic.      Physical Exam      General: no acute distress, well developed well nourished          Assessment:       1. Deficiency of anti-pneumococcal polysaccharide antibody    2. Hypogammaglobulinemia        Plan:       Deficiency of anti-pneumococcal polysaccharide antibody  -     immun glob G,IgG,-pro-IgA 0-50 (HIZENTRA) 10 gram/50 mL (20 %) Soln; Inject 80 mLs (16 g total) into the skin once a week.  Dispense: 320 mL; Refill: 12    Hypogammaglobulinemia        Hypogam and SAD   2/2022: doing well, no SE or need for abx.   Increased to 16 grams weekly 2/21.   continue infusions, hizentra 20% 600 mg/kg/month- 16 grams per week with [facility] pharmacy.   Discussed for her to get covid vaccine. obtained. 3/2021 and boosted.     Discussed infusion techniques.  Tobacco abuse contributing towards health comorbitdities   Dm may make it difficult for her to heal.     continue pulmonary care with Dr. Kaylee grady for blood glucose control.     Repeated IgG, IgE to see if there is any potential ABPA- rust colored brown sputum. Emphasematous changes on ct.   - negative abpa evaluation. "     History of tobacco abuse.           Argelia Vasquez M.D.  Allergy/Immunology  [facility]   [phone] phone  [phone] fax

## 2022-03-02 ENCOUNTER — TELEPHONE (OUTPATIENT)
Dept: ALLERGY | Facility: CLINIC | Age: 52
End: 2022-03-02
Payer: MEDICAID

## 2022-03-02 NOTE — TELEPHONE ENCOUNTER
Verbal auth given to [facility] infusion pharmacy for Hizentra infusion pre-meds, benadryl, tylenol, and epipen.

## 2022-05-10 ENCOUNTER — OFFICE VISIT (OUTPATIENT)
Dept: PULMONOLOGY | Facility: CLINIC | Age: 52
End: 2022-05-10
Payer: MEDICAID

## 2022-05-10 VITALS
BODY MASS INDEX: 28.28 KG/M2 | SYSTOLIC BLOOD PRESSURE: 108 MMHG | HEART RATE: 85 BPM | WEIGHT: 186 LBS | DIASTOLIC BLOOD PRESSURE: 74 MMHG | OXYGEN SATURATION: 97 %

## 2022-05-10 DIAGNOSIS — J43.8 OTHER EMPHYSEMA: Primary | ICD-10-CM

## 2022-05-10 DIAGNOSIS — Z87.891 PERSONAL HISTORY OF TOBACCO USE, PRESENTING HAZARDS TO HEALTH: ICD-10-CM

## 2022-05-10 DIAGNOSIS — R05.9 COUGH: ICD-10-CM

## 2022-05-10 PROCEDURE — 1160F PR REVIEW ALL MEDS BY PRESCRIBER/CLIN PHARMACIST DOCUMENTED: ICD-10-PCS | Mod: CPTII,S$GLB,, | Performed by: INTERNAL MEDICINE

## 2022-05-10 PROCEDURE — 1159F PR MEDICATION LIST DOCUMENTED IN MEDICAL RECORD: ICD-10-PCS | Mod: CPTII,S$GLB,, | Performed by: INTERNAL MEDICINE

## 2022-05-10 PROCEDURE — 99406 BEHAV CHNG SMOKING 3-10 MIN: CPT | Mod: S$GLB,,, | Performed by: INTERNAL MEDICINE

## 2022-05-10 PROCEDURE — 99214 OFFICE O/P EST MOD 30 MIN: CPT | Mod: 25,S$GLB,, | Performed by: INTERNAL MEDICINE

## 2022-05-10 PROCEDURE — 99214 PR OFFICE/OUTPT VISIT, EST, LEVL IV, 30-39 MIN: ICD-10-PCS | Mod: 25,S$GLB,, | Performed by: INTERNAL MEDICINE

## 2022-05-10 PROCEDURE — 1160F RVW MEDS BY RX/DR IN RCRD: CPT | Mod: CPTII,S$GLB,, | Performed by: INTERNAL MEDICINE

## 2022-05-10 PROCEDURE — 1159F MED LIST DOCD IN RCRD: CPT | Mod: CPTII,S$GLB,, | Performed by: INTERNAL MEDICINE

## 2022-05-10 PROCEDURE — 99406 PR TOBACCO USE CESSATION INTERMEDIATE 3-10 MINUTES: ICD-10-PCS | Mod: S$GLB,,, | Performed by: INTERNAL MEDICINE

## 2022-05-10 NOTE — PROGRESS NOTES
"    Subjective:       Patient ID: Araceli Ibarra is a 51 y.o. female.    Chief Complaint: Emphysema    5/17/2017 - Here for follow up, overall doing much better with ANORO.  Did get some chest wall injury at a fair and has some chest discomfort as a result.  She continues to work on stopping smoking. No other new complaints    1/21/2017 - Here for follow up, still smoking (lots of smokers in the house).  She is currently using cigarettes and a VAPE.  Has dyspnea (primarily with exertion), has daily cough (mucus currently brown - her usual).  She does feel that her breathing is worse over the last month or so.  Taking medications regularly.  Working on stopping smoking.    5/22/2018 - Here for follow up has been on TRELEGY for 4-6 months, still with problems with cough, congestion which has not been much better.  Still smoking (d/w her)., still using VAPE.  Mother has had similar problems.  Has had elevated WBC and is going to see hematologist.  Denies much nasal congestion, sputum is described as a "caramel" color.  No recent CXR.    7/31/2018 - Here for follow up, feels ok for now, has chronic cough but not worse.  Has chronic dyspnea (not worse).  Still smoking about 1/2 PPD.  Reviewed CXR, CT  And sinus films with pt.  No other new issues.    10/30/2018 - Here for follow up, has been getting immunoglobulin infusions.  Smoking about 6 cig/day.  Overall doing ok, no recent pneumonia, no recent ER visits or hospitalizations.  Sputum is still described as brown but not as much as usual.  Has also been approved for DALIRESP.and started about 2 weeks ago.    2/5/2019 - Here for follow up, still with daily mucus (white to a little green).  Breathing has been OK.  Usually sinuses are not as much of a problem.  No reflux or heartburn.  Has been on daliresp for about 4 months and on TRELEGY.  Still smoking (was as high as 1 PPD but back to < 1/2 PPD).  Getting IVIG and hasn't had pneumonia since.  No " "hemoptysis.    5/7/2019 - Here for follow up, overall stable on present meds (TRELEGY and DALIRESP - she has been on numerous other regimens and this has been the best at controlling her symptoms).  Pt is currently stable on present medications with no recent increases in their symptoms or use of rescue medications.  I have reviewed the medical regimen and re-educated the pt on the role of rescue and controlling medications.  All questions answered.  Inhaler technique seems adequate.  Still getting immunoglobulin infusions (dose has been increased recently).  Still smoking anywhere from (1/4 - 3/4 PPD) - lives in house with multiple smokers.  CT scan showed COPD but no acute changes.  Sputum culture and AFB were negative.     11/7/2019 - Had URI and temp to 104, CXR was OK, treated with antibiotics and feels better.  She does feel that she "let it go too far".  Has had issues with exposures to things, AC went out, still with a lot of stress.  Still smoking about < 1/2 PPD but has second hand exposure as well.  Getting infusions from Dr Vasquez.  Does not vape at this time.  Had a fall at [employer] and had some back injury which has slowed her down.    5/7/2020 - Virtual Visit    The chief complaint leading to consultation is: follow up  The patient location is:  in car  Visit type: Virtual visit with synchronous audio and video    There was a delay in getting some of her hizentra infusion and she felt that she was having more trouble but since restarting she feels that she is better (reports that dose has been increased).  Still smoking about 7-8 cigarettes per day.  Not vaping at this time.    I have discussed the limitations of a virtual visit with the patient including the fact that there are no vital signs and no way to fully examine the patient.  This could lead to misdiagnosis and possibly to delays in appropriate care.    The physical exam in this note is pulled forward from prior visits.  It will be updated " based on any findings which I can discern based on seeing the patient on a video screen.  I will not eliminate findings from prior exams at this visit.    11/11/2020 - Here for follow up, has been getting regular hizentra (weekly) infusions and has some continued chronic cough with some brownish sputum but does feel better overall with the infusions.  Still smoking but mostly less than 1/2 PPD.  Last CXR 6/2020 was OK.  Patient has no known corona virus exposures and has been practicing social distancing.  We have discussed the virus and precautions and all questions have been answered.    5/12/2021 - Here for follow up, coughing has been about the same to better.  Still continuing with Hizentra infusions.  Still smoking but has had some periods without smoking but her home situation is stressful and there are smokers.  Patient has no known corona virus exposures and has been practicing social distancing.  We have discussed the virus and precautions and all questions have been answered.  Got Covid vaccine (Pfizer).    11/10/2021 - Here for follow up,  Has not been on TRELEGY in a while.  Since last visit was sick one time and has resolved.  She is still smoking and is up and down with it.  Still with a lot of stress at home.  She wants to try and see how she does without a controlling medication since she has not been using one.  Still has some anxiety.  Patient has no known corona virus exposures and has been practicing social distancing.  We have discussed the virus and precautions and all questions have been answered.  She has had Covid vaccine (Pfizer and more recently had 2 doses of Moderna)    5/10/2022 - Here for follow up,  Pt is currently stable on present medications with no recent increases in their symptoms or use of rescue medications.  Since our last visit there have been no hospitalizations or ER visits for their respiratory issues and there does not seem to be anything to suggest unrecognized  "exacerbations.  I have reviewed the medical regimen and re-educated the pt on the role of rescue and controlling medications.  Inhaler technique and understanding seems adequate.  The patient reports no issues with any of there medications for their COPD.  Refills will be taken care of as needed.  All questions answered.  Patient has no known corona virus exposures and has been practicing social distancing.  We have discussed the virus and precautions and all questions have been answered.        COPD    GOLD A    Last PFT - 2/10/17  FEV1- 90 % DLCO - 59 %     + LABA/LAMA/ICS/daliresp    + prn ALBUTEROL    mMRC -  0 - SOB with strenuous exercise   - + 1 - SOB level ground, slight hill   -  2 - SOB walk slower or stop for breath level ground   -  3 - SOB at 100 yards or after few minutes   -  4 - SOB in house, dressing    Referral to PULMONARY REHABILITATION -   NO    Tested for alpha-1-antitrypsin - NO    Cigarette Counseling    Currently smoking < 1/2 packs per day  30 pack years    I have counseled pt for 3-5 minutes regarding cigarette cessation.  This has included the need to stop smoking as well as strategies, including but not limited to "cold turkey", CHANTIX (including risks and benefits of kasia drug), nicotine replacement and WELLBUTRIN.      COPD  Associated symptoms include coughing. Pertinent negatives include no abdominal pain, arthralgias, chest pain, chills, congestion, fatigue, fever, headaches, myalgias, nausea, numbness, sore throat or weakness.     Review of Systems   Constitutional: Negative for activity change, appetite change, chills, fatigue and fever.   HENT: Negative for congestion, hearing loss, nosebleeds, postnasal drip, sneezing and sore throat.    Respiratory: Positive for cough and shortness of breath. Negative for apnea, choking, chest tightness, wheezing and stridor.    Cardiovascular: Negative for chest pain, palpitations and leg swelling.   Gastrointestinal: Negative for abdominal " distention, abdominal pain, constipation, diarrhea and nausea.   Genitourinary: Negative for dysuria, frequency and urgency.   Musculoskeletal: Negative for arthralgias and myalgias.   Neurological: Negative for dizziness, tremors, seizures, syncope, facial asymmetry, speech difficulty, weakness, light-headedness, numbness and headaches.   Hematological: Negative for adenopathy.   Psychiatric/Behavioral: Negative for dysphoric mood. The patient is nervous/anxious.        Objective:       Vitals:    05/10/22 0906   BP: 108/74   BP Location: Left arm   Patient Position: Sitting   BP Method: X-Large (Manual)   Pulse: 85   SpO2: 97%   Weight: 84.4 kg (186 lb)         Physical Exam  Vitals and nursing note reviewed.   Constitutional:       General: She is not in acute distress.     Appearance: She is well-developed. She is not diaphoretic.   HENT:      Head: Normocephalic and atraumatic.      Nose: Nose normal.   Eyes:      Conjunctiva/sclera: Conjunctivae normal.      Pupils: Pupils are equal, round, and reactive to light.   Neck:      Thyroid: No thyromegaly.      Vascular: No JVD.      Trachea: No tracheal deviation.   Cardiovascular:      Rate and Rhythm: Normal rate and regular rhythm.      Heart sounds: Normal heart sounds. No murmur heard.    No friction rub. No gallop.   Pulmonary:      Effort: Pulmonary effort is normal. No respiratory distress.      Breath sounds: Normal breath sounds. No stridor. No wheezing or rales.   Chest:      Chest wall: No tenderness.   Abdominal:      General: Bowel sounds are normal. There is no distension.      Palpations: Abdomen is soft.      Tenderness: There is no abdominal tenderness.   Musculoskeletal:         General: No tenderness. Normal range of motion.      Cervical back: Normal range of motion and neck supple.   Lymphadenopathy:      Cervical: No cervical adenopathy.   Skin:     General: Skin is warm and dry.   Neurological:      Mental Status: She is alert and oriented  to person, place, and time.      Cranial Nerves: No cranial nerve deficit.   Psychiatric:         Behavior: Behavior normal.         Assessment:       No diagnosis found.    Plan:       Problem List Items Addressed This Visit        Pulmonary    Recurrent pneumonia  - stable at this time      Chronic obstructive pulmonary disease  - continue present medications  - needs to stop smoking, no vaping  - RTC 6 months         Other    Personal history of tobacco use, presenting hazards to health  - d/w pt about the need to stop    Immunoglobulin deficiency  - per Dr Vasquez  - gets weekly hizentra infusions           Darrel Page MD

## 2022-08-15 ENCOUNTER — OFFICE VISIT (OUTPATIENT)
Dept: OBSTETRICS AND GYNECOLOGY | Facility: CLINIC | Age: 52
End: 2022-08-15
Payer: MEDICAID

## 2022-08-15 VITALS
BODY MASS INDEX: 28.8 KG/M2 | RESPIRATION RATE: 16 BRPM | SYSTOLIC BLOOD PRESSURE: 120 MMHG | WEIGHT: 190.06 LBS | DIASTOLIC BLOOD PRESSURE: 80 MMHG | HEIGHT: 68 IN

## 2022-08-15 DIAGNOSIS — Z12.4 SCREENING FOR MALIGNANT NEOPLASM OF CERVIX: ICD-10-CM

## 2022-08-15 DIAGNOSIS — R82.90 ABNORMAL URINE ODOR: ICD-10-CM

## 2022-08-15 DIAGNOSIS — Z12.31 ENCOUNTER FOR SCREENING MAMMOGRAM FOR MALIGNANT NEOPLASM OF BREAST: ICD-10-CM

## 2022-08-15 DIAGNOSIS — Z01.419 WELL WOMAN EXAM WITH ROUTINE GYNECOLOGICAL EXAM: Primary | ICD-10-CM

## 2022-08-15 PROCEDURE — 99214 OFFICE O/P EST MOD 30 MIN: CPT | Mod: PBBFAC,PO | Performed by: OBSTETRICS & GYNECOLOGY

## 2022-08-15 PROCEDURE — 1159F MED LIST DOCD IN RCRD: CPT | Mod: CPTII,,, | Performed by: OBSTETRICS & GYNECOLOGY

## 2022-08-15 PROCEDURE — 3008F BODY MASS INDEX DOCD: CPT | Mod: CPTII,,, | Performed by: OBSTETRICS & GYNECOLOGY

## 2022-08-15 PROCEDURE — 1159F PR MEDICATION LIST DOCUMENTED IN MEDICAL RECORD: ICD-10-PCS | Mod: CPTII,,, | Performed by: OBSTETRICS & GYNECOLOGY

## 2022-08-15 PROCEDURE — 88175 CYTOPATH C/V AUTO FLUID REDO: CPT | Performed by: OBSTETRICS & GYNECOLOGY

## 2022-08-15 PROCEDURE — 99999 PR PBB SHADOW E&M-EST. PATIENT-LVL IV: CPT | Mod: PBBFAC,,, | Performed by: OBSTETRICS & GYNECOLOGY

## 2022-08-15 PROCEDURE — 99386 PR PREVENTIVE VISIT,NEW,40-64: ICD-10-PCS | Mod: S$PBB,,, | Performed by: OBSTETRICS & GYNECOLOGY

## 2022-08-15 PROCEDURE — 99386 PREV VISIT NEW AGE 40-64: CPT | Mod: S$PBB,,, | Performed by: OBSTETRICS & GYNECOLOGY

## 2022-08-15 PROCEDURE — 3074F SYST BP LT 130 MM HG: CPT | Mod: CPTII,,, | Performed by: OBSTETRICS & GYNECOLOGY

## 2022-08-15 PROCEDURE — 3008F PR BODY MASS INDEX (BMI) DOCUMENTED: ICD-10-PCS | Mod: CPTII,,, | Performed by: OBSTETRICS & GYNECOLOGY

## 2022-08-15 PROCEDURE — 3079F DIAST BP 80-89 MM HG: CPT | Mod: CPTII,,, | Performed by: OBSTETRICS & GYNECOLOGY

## 2022-08-15 PROCEDURE — 99999 PR PBB SHADOW E&M-EST. PATIENT-LVL IV: ICD-10-PCS | Mod: PBBFAC,,, | Performed by: OBSTETRICS & GYNECOLOGY

## 2022-08-15 PROCEDURE — 3079F PR MOST RECENT DIASTOLIC BLOOD PRESSURE 80-89 MM HG: ICD-10-PCS | Mod: CPTII,,, | Performed by: OBSTETRICS & GYNECOLOGY

## 2022-08-15 PROCEDURE — 3074F PR MOST RECENT SYSTOLIC BLOOD PRESSURE < 130 MM HG: ICD-10-PCS | Mod: CPTII,,, | Performed by: OBSTETRICS & GYNECOLOGY

## 2022-08-15 PROCEDURE — 87624 HPV HI-RISK TYP POOLED RSLT: CPT | Performed by: OBSTETRICS & GYNECOLOGY

## 2022-08-15 NOTE — PROGRESS NOTES
Chief Complaint   Patient presents with    Establish Care    Well Woman       History and Physical:  Patient's last menstrual period was 2022 (exact date).    Contraception: [facility]    Araceli Ibarra is a 51 y.o.  who presents today for her routine annual GYN exam.   The patient has no Gynecology complaints today.     She does report some recent malodorous urine.  No dysuria hematuria.  Colonoscopy was approximately 3-4 years ago.      Allergies:   Review of patient's allergies indicates:   Allergen Reactions    Levaquin [levofloxacin] Other (See Comments)     Tendonitis. Leg pain.    Quinazolinones Other (See Comments)     Tendonitis. Leg pain.      Penicillins Other (See Comments)     Unknown reaction as a child per mother. States she has taken amoxicillin with out reaction.      Cephalosporins Hives    Sulfa (sulfonamide antibiotics) Hives       Past Medical History:   Diagnosis Date    Abnormal Pap smear of cervix     Anxiety     Back spasm     Chest pain     Chronic bronchitis     Chronic neck and back pain     Chronic pain     COPD (chronic obstructive pulmonary disease)     Depression     Emphysema/COPD     Lump in neck     PNA (pneumonia)     Thyroid disease        Past Surgical History:   Procedure Laterality Date    CERVICAL CONIZATION   W/ LASER      INTRAVENOUS INFUSION      LEFT HEART CATHETERIZATION Left 2020    Procedure: CATHETERIZATION, HEART, LEFT;  Surgeon: Edis Peñaloza MD;  Location: [facility] CATH/EP LAB;  Service: Cardiology;  Laterality: Left;    PANCREAS SURGERY      Partial removal r/t cyst.    SKIN GRAFT Right     Arm    SPLENECTOMY, PARTIAL      TONSILLECTOMY      TUBAL LIGATION         MEDS:   Current Outpatient Medications:     albuterol (PROVENTIL/VENTOLIN HFA) 90 mcg/actuation inhaler, Inhale 2 puffs into the lungs every 6 (six) hours as needed. Rescue, Disp: 18 g, Rfl: 5    amitriptyline (ELAVIL) 100 MG tablet, amitriptyline 100 mg tablet   TAKE 1 TABLET BY MOUTH AT BEDTIME, Disp: , Rfl:     aspirin (ECOTRIN) 81 MG EC tablet, 1 tablet, Disp: , Rfl:     baclofen (LIORESAL) 20 MG tablet, Take 20 mg by mouth 3 (three) times daily. , Disp: , Rfl:     diphenhydrAMINE (BENADRYL ALLERGY) 25 mg tablet, Take 1 tablet (25 mg total) by mouth nightly as needed for Itching. (Patient taking differently: Take 25 mg by mouth nightly as needed (Taken with Hizentra.).), Disp: 30 tablet, Rfl: 6    gabapentin (NEURONTIN) 800 MG tablet, Take 800 mg by mouth 4 (four) times daily. , Disp: , Rfl: 1    HYDROcodone-acetaminophen (NORCO)  mg per tablet, Take 1 tablet by mouth 3 (three) times daily. , Disp: , Rfl:     immun glob G,IgG,-pro-IgA 0-50 (HIZENTRA) 10 gram/50 mL (20 %) Soln, Inject 80 mLs (16 g total) into the skin once a week., Disp: 320 mL, Rfl: 12    levothyroxine 100 mcg Cap, Take 100 mcg by mouth once daily. , Disp: , Rfl:     lidocaine (LIDODERM) 5 %, Place 2 patches onto the skin once daily. , Disp: , Rfl:     lidocaine-prilocaine (EMLA) cream, Apply topically as needed. (Patient taking differently: Apply 1 each topically as needed (For infusions.).), Disp: 30 g, Rfl: 3    meloxicam (MOBIC) 7.5 MG tablet, Take 7.5 mg by mouth 2 (two) times a day. , Disp: , Rfl:     metoprolol succinate (TOPROL-XL) 25 MG 24 hr tablet, Take 25 mg by mouth once daily. , Disp: , Rfl: 4    morphine (MS CONTIN) 15 MG 12 hr tablet, Take 15 mg by mouth 2 (two) times daily. , Disp: , Rfl:     multivit-min-iron-FA-lutein 8 mg iron-400 mcg-300 mcg Tab, , Disp: , Rfl:     nitroGLYCERIN (NITROSTAT) 0.4 MG SL tablet, nitroglycerin 0.4 mg sublingual tablet  PLACE 1 TABLET UNDER THE TONGUE AS NEEDED EVERY 5 MINUTES AS NEEDED FOR CHEST PAIN MAY REPEAT UP TO 3 DOSES IF PAIN PERSISTES GO TO ER, Disp: , Rfl:     QUEtiapine (SEROQUEL) 200 MG Tab, Take 200 mg by mouth every evening. , Disp: , Rfl: 0    rosuvastatin (CRESTOR) 20 MG tablet, Take 20 mg by mouth every evening. ,  Disp: , Rfl:     sumatriptan (IMITREX) 25 MG Tab, Take 25 mg by mouth daily as needed. , Disp: , Rfl:     temazepam (RESTORIL) 30 mg capsule, Take 30 mg by mouth every evening. , Disp: , Rfl: 2    albuterol (PROVENTIL) 2.5 mg /3 mL (0.083 %) nebulizer solution, Take 3 mLs (2.5 mg total) by nebulization every 6 (six) hours as needed for Wheezing. Rescue (Patient not taking: Reported on 8/15/2022), Disp: 100 each, Rfl: 6    ascorbic acid, vitamin C, (VITAMIN C) 1000 MG tablet, Take 1,000 mg by mouth once daily., Disp: , Rfl:     cholecalciferol, vitamin D3, (VITAMIN D3) 50 mcg (2,000 unit) Tab, Take 1 tablet by mouth once daily., Disp: , Rfl:      OB History        7    Para   5    Term           5    AB   2    Living   5       SAB        IAB        Ectopic        Multiple        Live Births   5                 Social History     Socioeconomic History    Marital status:    Tobacco Use    Smoking status: Current Every Day Smoker     Packs/day: 0.50     Types: Cigarettes, Vaping with nicotine    Smokeless tobacco: Never Used   Substance and Sexual Activity    Alcohol use: Not Currently    Drug use: No       Family History   Problem Relation Age of Onset    Heart disease Mother     Diabetes Mother     Heart disease Father          Past medical and surgical history reviewed.   I have reviewed the patient's medical history in detail and updated the computerized patient record.      Review of System:   Review of Systems   Constitutional: Negative for chills, fever and unexpected weight change.   HENT: Negative for congestion, ear pain, sinus pain and sore throat.    Eyes: Negative.    Respiratory: Negative for cough and shortness of breath.    Cardiovascular: Negative for chest pain.   Gastrointestinal: Negative for abdominal pain, constipation, diarrhea, nausea and vomiting.   Endocrine:        Longstanding issue with spontaneous sweating with negative workup.   Genitourinary: Negative for  "dyspareunia, frequency, hematuria and urgency.        Gyn as per HPI   Musculoskeletal: Negative for arthralgias.   Skin: Negative for rash.   Neurological: Negative for syncope, weakness and headaches.   Psychiatric/Behavioral: The patient is not nervous/anxious.         Physical Exam:   /80 (BP Location: Left arm, Patient Position: Sitting, BP Method: Medium (Manual))   Resp 16   Ht 5' 8" (1.727 m)   Wt 86.2 kg (190 lb 0.6 oz)   LMP 08/03/2022 (Exact Date)   BMI 28.89 kg/m²     Physical Exam  Vitals reviewed. Exam conducted with a chaperone present.   Constitutional:       Appearance: Normal appearance.   HENT:      Head: Normocephalic.   Neck:      Thyroid: No thyroid mass or thyromegaly.   Cardiovascular:      Rate and Rhythm: Normal rate and regular rhythm.   Pulmonary:      Effort: Pulmonary effort is normal.      Breath sounds: Normal breath sounds.   Chest:   Breasts:      Right: Normal. No mass, nipple discharge, skin change, tenderness or axillary adenopathy.      Left: Normal. No mass, nipple discharge, skin change, tenderness or axillary adenopathy.       Abdominal:      Palpations: Abdomen is soft.      Tenderness: There is no abdominal tenderness.      Hernia: No hernia is present.   Genitourinary:     General: Normal vulva.      Pubic Area: No rash.       Labia:         Right: No lesion.         Left: No lesion.       Urethra: No urethral pain.      Vagina: Normal. No lesions.      Cervix: Normal.      Uterus: Normal. Not tender.       Adnexa: Right adnexa normal and left adnexa normal.        Right: No tenderness or fullness.          Left: No tenderness or fullness.     Musculoskeletal:      Right ankle: No swelling.      Left ankle: No swelling.   Lymphadenopathy:      Cervical: No cervical adenopathy.      Upper Body:      Right upper body: No axillary adenopathy.      Left upper body: No axillary adenopathy.   Skin:     General: Skin is warm and dry.   Neurological:      Mental Status: " She is alert.   Psychiatric:         Attention and Perception: Attention normal.         Mood and Affect: Mood normal.       Assessment:   The primary encounter diagnosis was Well woman exam with routine gynecological exam. Diagnoses of Screening for malignant neoplasm of cervix, Encounter for screening mammogram for malignant neoplasm of breast, and Abnormal urine odor were also pertinent to this visit.       Plan:   Well woman exam with routine gynecological exam    Screening for malignant neoplasm of cervix  -     Liquid-Based Pap Smear, Screening  -     HPV High Risk Genotypes, PCR    Encounter for screening mammogram for malignant neoplasm of breast  -     Mammo Digital Screening Bilat w/ Maged; Future; Expected date: 08/15/2022  -     US Breast Right Complete; Future; Expected date: 08/15/2022    Abnormal urine odor  -     Urine culture; Future       No follow-ups on file.     The above was reviewed and discussed with the patient.    Annual exam and screening issues based on the patient's age, medical history and family history were reviewed / discussed.    Urine will be sent for culture and sensitivity.    The patient reports that screening mammograms usually require additional ultrasound which was ordered.    The patient's questions were answered, and she is in agreement with the current plan.

## 2022-08-31 ENCOUNTER — TELEPHONE (OUTPATIENT)
Dept: PULMONOLOGY | Facility: CLINIC | Age: 52
End: 2022-08-31

## 2022-08-31 NOTE — TELEPHONE ENCOUNTER
On Sunday had a fever 103 nothing since now she has a cough an a lot of mucus . What can she take ?

## 2022-09-02 RX ORDER — AZITHROMYCIN 250 MG/1
TABLET, FILM COATED ORAL
Qty: 6 TABLET | Refills: 0 | Status: SHIPPED | OUTPATIENT
Start: 2022-09-02 | End: 2023-02-09

## 2022-09-22 DIAGNOSIS — J43.8 OTHER EMPHYSEMA: Primary | ICD-10-CM

## 2022-09-22 RX ORDER — BUDESONIDE 0.5 MG/2ML
0.5 INHALANT ORAL 2 TIMES DAILY
Qty: 120 ML | Refills: 5 | Status: SHIPPED | OUTPATIENT
Start: 2022-09-22 | End: 2023-11-07

## 2022-09-22 RX ORDER — ALBUTEROL SULFATE 0.83 MG/ML
2.5 SOLUTION RESPIRATORY (INHALATION) EVERY 6 HOURS PRN
Qty: 120 ML | Refills: 5 | Status: SHIPPED | OUTPATIENT
Start: 2022-09-22 | End: 2023-09-22

## 2022-09-22 RX ORDER — ALBUTEROL SULFATE 90 UG/1
2 AEROSOL, METERED RESPIRATORY (INHALATION) EVERY 6 HOURS PRN
Qty: 18 G | Refills: 0 | Status: SHIPPED | OUTPATIENT
Start: 2022-09-22 | End: 2023-08-23

## 2022-10-24 ENCOUNTER — TELEPHONE (OUTPATIENT)
Dept: PULMONOLOGY | Facility: HOSPITAL | Age: 52
End: 2022-10-24

## 2022-10-24 RX ORDER — OSELTAMIVIR PHOSPHATE 75 MG/1
75 CAPSULE ORAL DAILY
Qty: 5 CAPSULE | Refills: 0 | Status: SHIPPED | OUTPATIENT
Start: 2022-10-24 | End: 2022-10-29

## 2022-11-10 ENCOUNTER — OFFICE VISIT (OUTPATIENT)
Dept: PULMONOLOGY | Facility: CLINIC | Age: 52
End: 2022-11-10
Payer: MEDICAID

## 2022-11-10 VITALS
WEIGHT: 187 LBS | HEART RATE: 85 BPM | SYSTOLIC BLOOD PRESSURE: 123 MMHG | BODY MASS INDEX: 28.43 KG/M2 | DIASTOLIC BLOOD PRESSURE: 70 MMHG | OXYGEN SATURATION: 96 %

## 2022-11-10 DIAGNOSIS — R06.02 SOB (SHORTNESS OF BREATH): Primary | ICD-10-CM

## 2022-11-10 DIAGNOSIS — Z87.891 PERSONAL HISTORY OF TOBACCO USE, PRESENTING HAZARDS TO HEALTH: ICD-10-CM

## 2022-11-10 DIAGNOSIS — R05.9 COUGH, UNSPECIFIED TYPE: ICD-10-CM

## 2022-11-10 DIAGNOSIS — J43.8 OTHER EMPHYSEMA: ICD-10-CM

## 2022-11-10 PROCEDURE — 99214 OFFICE O/P EST MOD 30 MIN: CPT | Mod: S$GLB,,, | Performed by: INTERNAL MEDICINE

## 2022-11-10 PROCEDURE — 1159F MED LIST DOCD IN RCRD: CPT | Mod: CPTII,S$GLB,, | Performed by: INTERNAL MEDICINE

## 2022-11-10 PROCEDURE — 3074F PR MOST RECENT SYSTOLIC BLOOD PRESSURE < 130 MM HG: ICD-10-PCS | Mod: CPTII,S$GLB,, | Performed by: INTERNAL MEDICINE

## 2022-11-10 PROCEDURE — 1160F PR REVIEW ALL MEDS BY PRESCRIBER/CLIN PHARMACIST DOCUMENTED: ICD-10-PCS | Mod: CPTII,S$GLB,, | Performed by: INTERNAL MEDICINE

## 2022-11-10 PROCEDURE — 3078F PR MOST RECENT DIASTOLIC BLOOD PRESSURE < 80 MM HG: ICD-10-PCS | Mod: CPTII,S$GLB,, | Performed by: INTERNAL MEDICINE

## 2022-11-10 PROCEDURE — 1159F PR MEDICATION LIST DOCUMENTED IN MEDICAL RECORD: ICD-10-PCS | Mod: CPTII,S$GLB,, | Performed by: INTERNAL MEDICINE

## 2022-11-10 PROCEDURE — 3078F DIAST BP <80 MM HG: CPT | Mod: CPTII,S$GLB,, | Performed by: INTERNAL MEDICINE

## 2022-11-10 PROCEDURE — 1160F RVW MEDS BY RX/DR IN RCRD: CPT | Mod: CPTII,S$GLB,, | Performed by: INTERNAL MEDICINE

## 2022-11-10 PROCEDURE — 99214 PR OFFICE/OUTPT VISIT, EST, LEVL IV, 30-39 MIN: ICD-10-PCS | Mod: S$GLB,,, | Performed by: INTERNAL MEDICINE

## 2022-11-10 PROCEDURE — 3008F BODY MASS INDEX DOCD: CPT | Mod: CPTII,S$GLB,, | Performed by: INTERNAL MEDICINE

## 2022-11-10 PROCEDURE — 3008F PR BODY MASS INDEX (BMI) DOCUMENTED: ICD-10-PCS | Mod: CPTII,S$GLB,, | Performed by: INTERNAL MEDICINE

## 2022-11-10 PROCEDURE — 3074F SYST BP LT 130 MM HG: CPT | Mod: CPTII,S$GLB,, | Performed by: INTERNAL MEDICINE

## 2022-11-10 RX ORDER — ESCITALOPRAM OXALATE 10 MG/1
20 TABLET ORAL DAILY
COMMUNITY
Start: 2022-11-01 | End: 2024-01-29

## 2022-11-10 RX ORDER — ISOSORBIDE MONONITRATE 30 MG/1
30 TABLET, EXTENDED RELEASE ORAL DAILY
COMMUNITY
Start: 2022-11-01 | End: 2023-05-11 | Stop reason: SDUPTHER

## 2022-11-10 RX ORDER — ALPRAZOLAM 0.5 MG/1
0.5 TABLET ORAL 3 TIMES DAILY PRN
COMMUNITY
Start: 2022-11-01 | End: 2023-08-23

## 2022-11-10 NOTE — PROGRESS NOTES
"    Subjective:       Patient ID: Araceli Ibarra is a 52 y.o. female.    Chief Complaint: Follow-up (Copd follow up )    5/17/2017 - Here for follow up, overall doing much better with ANORO.  Did get some chest wall injury at a fair and has some chest discomfort as a result.  She continues to work on stopping smoking. No other new complaints    1/21/2017 - Here for follow up, still smoking (lots of smokers in the house).  She is currently using cigarettes and a VAPE.  Has dyspnea (primarily with exertion), has daily cough (mucus currently brown - her usual).  She does feel that her breathing is worse over the last month or so.  Taking medications regularly.  Working on stopping smoking.    5/22/2018 - Here for follow up has been on TRELEGY for 4-6 months, still with problems with cough, congestion which has not been much better.  Still smoking (d/w her)., still using VAPE.  Mother has had similar problems.  Has had elevated WBC and is going to see hematologist.  Denies much nasal congestion, sputum is described as a "caramel" color.  No recent CXR.    7/31/2018 - Here for follow up, feels ok for now, has chronic cough but not worse.  Has chronic dyspnea (not worse).  Still smoking about 1/2 PPD.  Reviewed CXR, CT  And sinus films with pt.  No other new issues.    10/30/2018 - Here for follow up, has been getting immunoglobulin infusions.  Smoking about 6 cig/day.  Overall doing ok, no recent pneumonia, no recent ER visits or hospitalizations.  Sputum is still described as brown but not as much as usual.  Has also been approved for DALIRESP.and started about 2 weeks ago.    2/5/2019 - Here for follow up, still with daily mucus (white to a little green).  Breathing has been OK.  Usually sinuses are not as much of a problem.  No reflux or heartburn.  Has been on daliresp for about 4 months and on TRELEGY.  Still smoking (was as high as 1 PPD but back to < 1/2 PPD).  Getting IVIG and hasn't had pneumonia since.  No " "hemoptysis.    5/7/2019 - Here for follow up, overall stable on present meds (TRELEGY and DALIRESP - she has been on numerous other regimens and this has been the best at controlling her symptoms).  Pt is currently stable on present medications with no recent increases in their symptoms or use of rescue medications.  I have reviewed the medical regimen and re-educated the pt on the role of rescue and controlling medications.  All questions answered.  Inhaler technique seems adequate.  Still getting immunoglobulin infusions (dose has been increased recently).  Still smoking anywhere from (1/4 - 3/4 PPD) - lives in house with multiple smokers.  CT scan showed COPD but no acute changes.  Sputum culture and AFB were negative.     11/7/2019 - Had URI and temp to 104, CXR was OK, treated with antibiotics and feels better.  She does feel that she "let it go too far".  Has had issues with exposures to things, AC went out, still with a lot of stress.  Still smoking about < 1/2 PPD but has second hand exposure as well.  Getting infusions from Dr Vasquez.  Does not vape at this time.  Had a [place] [id number] and had some back injury which has slowed her down.    5/7/2020 - Virtual Visit    The chief complaint leading to consultation is: follow up  The patient location is:  in car  Visit type: Virtual visit with synchronous audio and video    There was a delay in getting some of her hizentra infusion and she felt that she was having more trouble but since restarting she feels that she is better (reports that dose has been increased).  Still smoking about 7-8 cigarettes per day.  Not vaping at this time.    I have discussed the limitations of a virtual visit with the patient including the fact that there are no vital signs and no way to fully examine the patient.  This could lead to misdiagnosis and possibly to delays in appropriate care.    The physical exam in this note is pulled forward from prior visits.  It will be updated " based on any findings which I can discern based on seeing the patient on a video screen.  I will not eliminate findings from prior exams at this visit.    11/11/2020 - Here for follow up, has been getting regular hizentra (weekly) infusions and has some continued chronic cough with some brownish sputum but does feel better overall with the infusions.  Still smoking but mostly less than 1/2 PPD.  Last CXR 6/2020 was OK.  Patient has no known corona virus exposures and has been practicing social distancing.  We have discussed the virus and precautions and all questions have been answered.    5/12/2021 - Here for follow up, coughing has been about the same to better.  Still continuing with Hizentra infusions.  Still smoking but has had some periods without smoking but her home situation is stressful and there are smokers.  Patient has no known corona virus exposures and has been practicing social distancing.  We have discussed the virus and precautions and all questions have been answered.  Got Covid vaccine (Pfizer).    11/10/2021 - Here for follow up,  Has not been on TRELEGY in a while.  Since last visit was sick one time and has resolved.  She is still smoking and is up and down with it.  Still with a lot of stress at home.  She wants to try and see how she does without a controlling medication since she has not been using one.  Still has some anxiety.  Patient has no known corona virus exposures and has been practicing social distancing.  We have discussed the virus and precautions and all questions have been answered.  She has had Covid vaccine (Pfizer and more recently had 2 doses of Moderna)    5/10/2022 - Here for follow up,  Pt is currently stable on present medications with no recent increases in their symptoms or use of rescue medications.  Since our last visit there have been no hospitalizations or ER visits for their respiratory issues and there does not seem to be anything to suggest unrecognized  "exacerbations.  I have reviewed the medical regimen and re-educated the pt on the role of rescue and controlling medications.  Inhaler technique and understanding seems adequate.  The patient reports no issues with any of there medications for their COPD.  Refills will be taken care of as needed.  All questions answered.  Patient has no known corona virus exposures and has been practicing social distancing.  We have discussed the virus and precautions and all questions have been answered.    11/10/2022 - Here for follow up, under a lot of stress with her mother (now in hospital). Breathing not as good because of her stress.  No other new issues.   HAs increased smoking to > 1 PPD (we discussed this).        COPD    GOLD A    Last PFT - 2/10/17  FEV1- 90 % DLCO - 59 %     + LABA/LAMA/ICS/daliresp    + prn ALBUTEROL    mMRC -  0 - SOB with strenuous exercise   - + 1 - SOB level ground, slight hill   -  2 - SOB walk slower or stop for breath level ground   -  3 - SOB at 100 yards or after few minutes   -  4 - SOB in house, dressing    Referral to PULMONARY REHABILITATION -   NO    Tested for alpha-1-antitrypsin - NO    Cigarette Counseling    Currently smoking ab 1 packs per day  30 pack years    I have counseled pt for 3-5 minutes regarding cigarette cessation.  This has included the need to stop smoking as well as strategies, including but not limited to "cold turkey", CHANTIX (including risks and benefits of kasia drug), nicotine replacement and WELLBUTRIN.      COPD  Associated symptoms include coughing. Pertinent negatives include no abdominal pain, arthralgias, chest pain, chills, congestion, fatigue, fever, headaches, myalgias, nausea, numbness, sore throat or weakness.   Follow-up  Associated symptoms include coughing. Pertinent negatives include no abdominal pain, arthralgias, chest pain, chills, congestion, fatigue, fever, headaches, myalgias, nausea, numbness, sore throat or weakness.   Review of Systems "   Constitutional:  Negative for activity change, appetite change, chills, fatigue and fever.   HENT:  Negative for congestion, hearing loss, nosebleeds, postnasal drip, sneezing and sore throat.    Respiratory:  Positive for cough and shortness of breath. Negative for apnea, choking, chest tightness, wheezing and stridor.    Cardiovascular:  Negative for chest pain, palpitations and leg swelling.   Gastrointestinal:  Negative for abdominal distention, abdominal pain, constipation, diarrhea and nausea.   Genitourinary:  Negative for dysuria, frequency and urgency.   Musculoskeletal:  Negative for arthralgias and myalgias.   Neurological:  Negative for dizziness, tremors, seizures, syncope, facial asymmetry, speech difficulty, weakness, light-headedness, numbness and headaches.   Hematological:  Negative for adenopathy.   Psychiatric/Behavioral:  Negative for dysphoric mood. The patient is nervous/anxious.      Objective:       Vitals:    11/10/22 1022   BP: 123/70   BP Location: Left arm   Patient Position: Sitting   BP Method: Medium (Manual)   Pulse: 85   SpO2: 96%   Weight: 84.8 kg (187 lb)         Physical Exam  Vitals and nursing note reviewed.   Constitutional:       General: She is not in acute distress.     Appearance: She is well-developed. She is not diaphoretic.   HENT:      Head: Normocephalic and atraumatic.      Nose: Nose normal.   Eyes:      Conjunctiva/sclera: Conjunctivae normal.      Pupils: Pupils are equal, round, and reactive to light.   Neck:      Thyroid: No thyromegaly.      Vascular: No JVD.      Trachea: No tracheal deviation.   Cardiovascular:      Rate and Rhythm: Normal rate and regular rhythm.      Heart sounds: Normal heart sounds. No murmur heard.    No friction rub. No gallop.   Pulmonary:      Effort: Pulmonary effort is normal. No respiratory distress.      Breath sounds: Normal breath sounds. No stridor. No wheezing or rales.   Chest:      Chest wall: No tenderness.   Abdominal:       General: Bowel sounds are normal. There is no distension.      Palpations: Abdomen is soft.      Tenderness: There is no abdominal tenderness.   Musculoskeletal:         General: No tenderness. Normal range of motion.      Cervical back: Normal range of motion and neck supple.   Lymphadenopathy:      Cervical: No cervical adenopathy.   Skin:     General: Skin is warm and dry.   Neurological:      Mental Status: She is alert and oriented to person, place, and time.      Cranial Nerves: No cranial nerve deficit.   Psychiatric:         Behavior: Behavior normal.       Assessment:       No diagnosis found.    Plan:       Problem List Items Addressed This Visit          Pulmonary    Recurrent pneumonia  - stable at this time      Chronic obstructive pulmonary disease  - continue present medications  - needs to stop smoking, no vaping  - RTC 6 months         Other    Personal history of tobacco use, presenting hazards to health  - d/w pt about the need to stop    Immunoglobulin deficiency  - per Dr Vasquez  - gets weekly hizentra infusions             Darrel Page MD

## 2023-01-03 ENCOUNTER — TELEPHONE (OUTPATIENT)
Dept: PULMONOLOGY | Facility: CLINIC | Age: 53
End: 2023-01-03

## 2023-01-03 DIAGNOSIS — J18.9 RECURRENT PNEUMONIA: Primary | ICD-10-CM

## 2023-01-03 NOTE — TELEPHONE ENCOUNTER
Patient called said she has recently had a cold an thinks she is over it for the most part . She said that she gets weekly infusions . She would like to see if we can call her in something or see if we can do a cxr she believes that she has pneumonia an says that her lungs hurt .

## 2023-01-04 ENCOUNTER — HOSPITAL ENCOUNTER (OUTPATIENT)
Dept: RADIOLOGY | Facility: HOSPITAL | Age: 53
Discharge: HOME | End: 2023-01-04
Attending: INTERNAL MEDICINE
Payer: MEDICAID

## 2023-01-04 DIAGNOSIS — J18.9 RECURRENT PNEUMONIA: ICD-10-CM

## 2023-01-04 PROCEDURE — 71046 X-RAY EXAM CHEST 2 VIEWS: CPT | Mod: TC,PO

## 2023-02-09 ENCOUNTER — OFFICE VISIT (OUTPATIENT)
Dept: ALLERGY | Facility: CLINIC | Age: 53
End: 2023-02-09
Payer: MEDICAID

## 2023-02-09 VITALS
DIASTOLIC BLOOD PRESSURE: 88 MMHG | BODY MASS INDEX: 28.92 KG/M2 | WEIGHT: 190.19 LBS | TEMPERATURE: 97 F | OXYGEN SATURATION: 97 % | HEART RATE: 110 BPM | SYSTOLIC BLOOD PRESSURE: 138 MMHG

## 2023-02-09 DIAGNOSIS — D80.6 DEFICIENCY OF ANTI-PNEUMOCOCCAL POLYSACCHARIDE ANTIBODY: ICD-10-CM

## 2023-02-09 DIAGNOSIS — D80.3 IGG DEFICIENCY: Primary | ICD-10-CM

## 2023-02-09 PROCEDURE — 1160F PR REVIEW ALL MEDS BY PRESCRIBER/CLIN PHARMACIST DOCUMENTED: ICD-10-PCS | Mod: CPTII,S$GLB,, | Performed by: ALLERGY & IMMUNOLOGY

## 2023-02-09 PROCEDURE — 3008F PR BODY MASS INDEX (BMI) DOCUMENTED: ICD-10-PCS | Mod: CPTII,S$GLB,, | Performed by: ALLERGY & IMMUNOLOGY

## 2023-02-09 PROCEDURE — 3079F PR MOST RECENT DIASTOLIC BLOOD PRESSURE 80-89 MM HG: ICD-10-PCS | Mod: CPTII,S$GLB,, | Performed by: ALLERGY & IMMUNOLOGY

## 2023-02-09 PROCEDURE — 3075F PR MOST RECENT SYSTOLIC BLOOD PRESS GE 130-139MM HG: ICD-10-PCS | Mod: CPTII,S$GLB,, | Performed by: ALLERGY & IMMUNOLOGY

## 2023-02-09 PROCEDURE — 3075F SYST BP GE 130 - 139MM HG: CPT | Mod: CPTII,S$GLB,, | Performed by: ALLERGY & IMMUNOLOGY

## 2023-02-09 PROCEDURE — 3008F BODY MASS INDEX DOCD: CPT | Mod: CPTII,S$GLB,, | Performed by: ALLERGY & IMMUNOLOGY

## 2023-02-09 PROCEDURE — 1159F PR MEDICATION LIST DOCUMENTED IN MEDICAL RECORD: ICD-10-PCS | Mod: CPTII,S$GLB,, | Performed by: ALLERGY & IMMUNOLOGY

## 2023-02-09 PROCEDURE — 99213 OFFICE O/P EST LOW 20 MIN: CPT | Mod: S$GLB,,, | Performed by: ALLERGY & IMMUNOLOGY

## 2023-02-09 PROCEDURE — 99213 PR OFFICE/OUTPT VISIT, EST, LEVL III, 20-29 MIN: ICD-10-PCS | Mod: S$GLB,,, | Performed by: ALLERGY & IMMUNOLOGY

## 2023-02-09 PROCEDURE — 1160F RVW MEDS BY RX/DR IN RCRD: CPT | Mod: CPTII,S$GLB,, | Performed by: ALLERGY & IMMUNOLOGY

## 2023-02-09 PROCEDURE — 1159F MED LIST DOCD IN RCRD: CPT | Mod: CPTII,S$GLB,, | Performed by: ALLERGY & IMMUNOLOGY

## 2023-02-09 PROCEDURE — 3079F DIAST BP 80-89 MM HG: CPT | Mod: CPTII,S$GLB,, | Performed by: ALLERGY & IMMUNOLOGY

## 2023-02-09 RX ORDER — ISOSORBIDE DINITRATE 30 MG/1
1 TABLET ORAL
COMMUNITY
End: 2023-05-11

## 2023-02-09 RX ORDER — OSELTAMIVIR PHOSPHATE 75 MG/1
CAPSULE ORAL
COMMUNITY
Start: 2022-12-12 | End: 2023-08-23

## 2023-02-09 RX ORDER — HUMAN IMMUNOGLOBULIN G 0.2 G/ML
16 LIQUID SUBCUTANEOUS WEEKLY
Qty: 320 ML | Refills: 12 | Status: SHIPPED | OUTPATIENT
Start: 2023-02-09 | End: 2024-01-29 | Stop reason: SDUPTHER

## 2023-02-09 NOTE — PROGRESS NOTES
"Subjective:       Patient ID: Araceli Ibarra is a 52 y.o. female.    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    Her last visit was 2/2022    No antibiotics since her last visit.     She is doing well on her increased hizentra dose. 16 grams once per week.      No side effects.     SAD and Igg hypogam - doing well.   did not respond to her pneumonia vaccine and lower igg level.   This is in addition to her hypogam.   Currently infusing 16 grams of hizentra weekly sub q and feels improvement being on the IgG replacement therapy.   600 mg/kg/ month.   She is with [facility]. Receives shipments on time.   Mainly se are fatigue. Can have headaches with infusions.   No headaches with increase in dose.   Needs longer needles had leaking in her legs. Most recent dose.   Infuses in legs and stomach, rotates sites.   Possible 9 mm needle, discussed 12 -14 mm needle. Still has leakage with infusions in legs.   Duration is 1 hour.     Abx:  None since last visit.   Feels that hizentra has stopped pna and the "stabbing pain."    She was given z pack for her pneumonia 2/2019.  She has then had an increase in her hizentra to 14 grams weekly in November 2019.  She had doxycycline 11/2019 and azithromycin 12/2019.  increase 13 grams weekly 5/2019 hizentra due to pneumonia and given abx.   Recurrent infections Onset: 19 yrs old.   Pna: 2 per year since she was 19 yrs old. - recently had azithro for cap outpt . Per report   Sinusitis: none   Otitis: none   She is still having symptoms of cough and mucus production. Always productive from yellow to brown.   She does endorse emphysema as well that may contribute towards proclivity for infection.     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 Has had recent ppv 23.     Labs:  IgG, Total Serum         "      625 L                   700-1600  mg/dL         Region 6 panel: negative     Component      Latest Ref Rng & Units 6/7/2018 5/24/2018   IgE      0 - 100 IU/mL  11   Immunoglobulin G      700 - 1,600 mg/dL 626 (L)    Immunoglobulin A (IgA)      87 - 352 mg/dL 172    Immunoglobulin M      26 - 217 mg/dL 80      Component      Latest Ref Rng & Units 5/7/2019   IgG, Serum      700 - 1600 mg/dL 1107       Component      Latest Ref Rng & Units 6/7/2018   Streptococcus pneumoniae 1 Ab IgG      >1.3 ug/mL <0.1 (L)   Streptococcus pneumoniae 3 Ab IgG      >1.3 ug/mL 1.2 (L)   Streptococcus pneumoniae 4 Ab IgG      >1.3 ug/mL 0.1 (L)   Streptococcus pneumoniae 8 Ab IgG      >1.3 ug/mL 1.4   Streptococcus pneumoniae 9 Ab IgG      >1.3 ug/mL 0.2 (L)   Streptococcus pneumoniae 12 Ab IgG      >1.3 ug/mL <0.1 (L)   Streptococcus pneumoniae 14 Ab IgG      >1.3 ug/mL 22.5   Streptococcus pneumoniae 19 Ab IgG      >1.3 ug/mL 6.1   Streptococcus pneumoniae 23 Ab IgG      >1.3 ug/mL <0.1 (L)   Streptococcus pneumoniae 26 Ab IgG      >1.3 ug/mL 3.7   Streptococcus pneumoniae 51 Ab IgG      >1.3 ug/mL 0.1 (L)   Streptococcus pneumoniae 56 Ab IgG      >1.3 ug/mL 1.8   Streptococcus pneumoniae 57 Ab IgG      >1.3 ug/mL 1.3 (L)   Streptococcus pneumoniae 68 Ab IgG      >1.3 ug/mL 1.4     Component      Latest Ref Rng & Units 8/31/2018   PNEUMO AB TYPE 5 AVIDITY      AI CANCELED   Pneumo Ab Type 68(9V) Avidity      AI 1.2   PNEUMO AB TYPE 1 AVIDITY      AI TNP   PNEUMO AB TYPE 3 AVIDITY      AI <0.3   Pneumo Ab Type 4 Avidity      AI TNP   Pneumo Ab Type 26(6B) Avidity      AI <0.3   Pneumo Ab Type 8 Avidity      AI 0.7   Pneumo Ab Type 9(9N) Avidity      AI <0.3   Pneumo Ab Type 12(12F)Avidity      AI TNP   Pneumo Ab Type 14 Avidity      AI 0.8   Pneumo Ab Type 19(19F)Avidity      AI <0.3   Pneumo Ab Type 23(23F)Avidity      AI TNP   Pneumo Ab Type 51(7F) Avidity      AI 1.2   Pneumo Ab Type 56(18C)Avidity      AI 0.4       She did not  respond to her vaccine.     Component      Latest Ref Rng & Units 5/18/2020 6/7/2018   Immunoglobulin G      586 - 1,602 mg/dL 1,047 626 (L)       Review of Systems      General: neg unexpected weight changes, fevers, chills, night sweats, malaise  HEENT: see hpi, Neg eye pain, vision changes, ear drainage, nose bleeds, throat tightness, sores in the mouth  CV: Neg chest pain, palpitations, swelling  Resp: see hpi, neg shortness of breath, hemoptysis  GI: see hpi, neg dysphagia, night abdominal pain, reflux, chronic diarrhea, chronic constipation  Derm: See Hpi, neg new rash, neg flushing  Mu/sk: Neg joint pain, joint swelling   Psych: Neg anxiety  neuro: neg chronic headaches, muscle weakness  Endo: +chronic fatigue + heat intolerance and sweating neg cold     Objective:     Vitals:    02/09/23 1117   BP: 138/88   Pulse: 110   Temp: 97 °F (36.1 °C)   SpO2: 97%   Weight: 86.3 kg (190 lb 3.2 oz)     No peak flow due to pandemic.      Physical Exam      General: no acute distress, well developed well nourished       Assessment:       1. IgG deficiency    2. Deficiency of anti-pneumococcal polysaccharide antibody        Plan:       IgG deficiency  -     IgG; Future; Expected date: 02/09/2023    Deficiency of anti-pneumococcal polysaccharide antibody  -     immun glob G,IgG,-pro-IgA 0-50 (HIZENTRA) 10 gram/50 mL (20 %) Soln; Inject 80 mLs (16 g total) into the skin once a week.  Dispense: 320 mL; Refill: 12        Hypogam and SAD   2/23:  Doing well on hizentra 16 grams weekly  No side effects   Continue as current.    Repeat igg level to make sure therapeutic level at a trough.     Stress at home with her mother being ill.     2/2022: doing well, no SE or need for abx.   Increased to 16 grams weekly 2/21.   continue infusions, hizentra 20% 600 mg/kg/month- 16 grams per week with [facility] pharmacy.   Discussed for her to get covid vaccine. obtained. 3/2021 and boosted.     Discussed infusion techniques.  Tobacco  abuse contributing towards health comorbitdities   Dm may make it difficult for her to heal.     continue pulmonary care with Dr. Page    primary for blood glucose control.     History of tobacco abuse.           Argelia Vasquez M.D.  Allergy/Immunology  [facility]   [phone] phone  [phone] fax

## 2023-02-09 NOTE — PATIENT INSTRUCTIONS
IgG level to be repeated at trough level. Or get level non fasting right before your infusion.   Do not get after the infusion.     Continue 16 grams weekly.     Follow up in 12 months, sooner if needed.

## 2023-03-09 ENCOUNTER — TELEPHONE (OUTPATIENT)
Dept: ALLERGY | Facility: CLINIC | Age: 53
End: 2023-03-09

## 2023-03-09 NOTE — TELEPHONE ENCOUNTER
HIZENTRA AUTHORIZATION NUMBER [id number]  APPROVED. [facility] called in and gave approval number, cannot fax approval to office, only can pull out of portal

## 2023-05-11 ENCOUNTER — OFFICE VISIT (OUTPATIENT)
Dept: PULMONOLOGY | Facility: CLINIC | Age: 53
End: 2023-05-11
Payer: MEDICAID

## 2023-05-11 VITALS
DIASTOLIC BLOOD PRESSURE: 80 MMHG | SYSTOLIC BLOOD PRESSURE: 132 MMHG | BODY MASS INDEX: 29.19 KG/M2 | HEART RATE: 98 BPM | WEIGHT: 192 LBS | OXYGEN SATURATION: 96 %

## 2023-05-11 DIAGNOSIS — J43.8 OTHER EMPHYSEMA: ICD-10-CM

## 2023-05-11 DIAGNOSIS — Z87.891 PERSONAL HISTORY OF TOBACCO USE, PRESENTING HAZARDS TO HEALTH: ICD-10-CM

## 2023-05-11 DIAGNOSIS — F43.9 STRESS AT HOME: ICD-10-CM

## 2023-05-11 DIAGNOSIS — J18.9 RECURRENT PNEUMONIA: Primary | ICD-10-CM

## 2023-05-11 PROCEDURE — 3008F BODY MASS INDEX DOCD: CPT | Mod: CPTII,S$GLB,, | Performed by: INTERNAL MEDICINE

## 2023-05-11 PROCEDURE — 99214 OFFICE O/P EST MOD 30 MIN: CPT | Mod: 25,S$GLB,, | Performed by: INTERNAL MEDICINE

## 2023-05-11 PROCEDURE — 1159F MED LIST DOCD IN RCRD: CPT | Mod: CPTII,S$GLB,, | Performed by: INTERNAL MEDICINE

## 2023-05-11 PROCEDURE — 1160F RVW MEDS BY RX/DR IN RCRD: CPT | Mod: CPTII,S$GLB,, | Performed by: INTERNAL MEDICINE

## 2023-05-11 PROCEDURE — 3075F SYST BP GE 130 - 139MM HG: CPT | Mod: CPTII,S$GLB,, | Performed by: INTERNAL MEDICINE

## 2023-05-11 PROCEDURE — 3008F PR BODY MASS INDEX (BMI) DOCUMENTED: ICD-10-PCS | Mod: CPTII,S$GLB,, | Performed by: INTERNAL MEDICINE

## 2023-05-11 PROCEDURE — 1159F PR MEDICATION LIST DOCUMENTED IN MEDICAL RECORD: ICD-10-PCS | Mod: CPTII,S$GLB,, | Performed by: INTERNAL MEDICINE

## 2023-05-11 PROCEDURE — 1160F PR REVIEW ALL MEDS BY PRESCRIBER/CLIN PHARMACIST DOCUMENTED: ICD-10-PCS | Mod: CPTII,S$GLB,, | Performed by: INTERNAL MEDICINE

## 2023-05-11 PROCEDURE — 99214 PR OFFICE/OUTPT VISIT, EST, LEVL IV, 30-39 MIN: ICD-10-PCS | Mod: 25,S$GLB,, | Performed by: INTERNAL MEDICINE

## 2023-05-11 PROCEDURE — 3079F PR MOST RECENT DIASTOLIC BLOOD PRESSURE 80-89 MM HG: ICD-10-PCS | Mod: CPTII,S$GLB,, | Performed by: INTERNAL MEDICINE

## 2023-05-11 PROCEDURE — 99406 PR TOBACCO USE CESSATION INTERMEDIATE 3-10 MINUTES: ICD-10-PCS | Mod: ,,, | Performed by: INTERNAL MEDICINE

## 2023-05-11 PROCEDURE — 3079F DIAST BP 80-89 MM HG: CPT | Mod: CPTII,S$GLB,, | Performed by: INTERNAL MEDICINE

## 2023-05-11 PROCEDURE — 99406 BEHAV CHNG SMOKING 3-10 MIN: CPT | Mod: ,,, | Performed by: INTERNAL MEDICINE

## 2023-05-11 PROCEDURE — 3075F PR MOST RECENT SYSTOLIC BLOOD PRESS GE 130-139MM HG: ICD-10-PCS | Mod: CPTII,S$GLB,, | Performed by: INTERNAL MEDICINE

## 2023-05-11 RX ORDER — ISOSORBIDE MONONITRATE 30 MG/1
30 TABLET, EXTENDED RELEASE ORAL DAILY
Qty: 30 TABLET | Refills: 5 | Status: SHIPPED | OUTPATIENT
Start: 2023-05-11 | End: 2023-09-26

## 2023-05-11 NOTE — PROGRESS NOTES
"    Subjective:       Patient ID: Araceli Ibarra is a 52 y.o. female.    Chief Complaint: Follow-up (Follow up emphysema ; Needs rf on metoprol an isosoribide )      5/17/2017 - Here for follow up, overall doing much better with ANORO.  Did get some chest wall injury at a fair and has some chest discomfort as a result.  She continues to work on stopping smoking. No other new complaints    1/21/2017 - Here for follow up, still smoking (lots of smokers in the house).  She is currently using cigarettes and a VAPE.  Has dyspnea (primarily with exertion), has daily cough (mucus currently brown - her usual).  She does feel that her breathing is worse over the last month or so.  Taking medications regularly.  Working on stopping smoking.    5/22/2018 - Here for follow up has been on TRELEGY for 4-6 months, still with problems with cough, congestion which has not been much better.  Still smoking (d/w her)., still using VAPE.  Mother has had similar problems.  Has had elevated WBC and is going to see hematologist.  Denies much nasal congestion, sputum is described as a "caramel" color.  No recent CXR.    7/31/2018 - Here for follow up, feels ok for now, has chronic cough but not worse.  Has chronic dyspnea (not worse).  Still smoking about 1/2 PPD.  Reviewed CXR, CT  And sinus films with pt.  No other new issues.    10/30/2018 - Here for follow up, has been getting immunoglobulin infusions.  Smoking about 6 cig/day.  Overall doing ok, no recent pneumonia, no recent ER visits or hospitalizations.  Sputum is still described as brown but not as much as usual.  Has also been approved for DALIRESP.and started about 2 weeks ago.    2/5/2019 - Here for follow up, still with daily mucus (white to a little green).  Breathing has been OK.  Usually sinuses are not as much of a problem.  No reflux or heartburn.  Has been on daliresp for about 4 months and on TRELEGY.  Still smoking (was as high as 1 PPD but back to < 1/2 PPD).  Getting " "IVIG and hasn't had pneumonia since.  No hemoptysis.    5/7/2019 - Here for follow up, overall stable on present meds (TRELEGY and DALIRESP - she has been on numerous other regimens and this has been the best at controlling her symptoms).  Pt is currently stable on present medications with no recent increases in their symptoms or use of rescue medications.  I have reviewed the medical regimen and re-educated the pt on the role of rescue and controlling medications.  All questions answered.  Inhaler technique seems adequate.  Still getting immunoglobulin infusions (dose has been increased recently).  Still smoking anywhere from (1/4 - 3/4 PPD) - lives in house with multiple smokers.  CT scan showed COPD but no acute changes.  Sputum culture and AFB were negative.     11/7/2019 - Had URI and temp to 104, CXR was OK, treated with antibiotics and feels better.  She does feel that she "let it go too far".  Has had issues with exposures to things, AC went out, still with a lot of stress.  Still smoking about < 1/2 PPD but has second hand exposure as well.  Getting infusions from Dr Vasquez.  Does not vape at this time.  Had a fall at [employer] and had some back injury which has slowed her down.    5/7/2020 - Virtual Visit    The chief complaint leading to consultation is: follow up  The patient location is:  in car  Visit type: Virtual visit with synchronous audio and video    There was a delay in getting some of her hizentra infusion and she felt that she was having more trouble but since restarting she feels that she is better (reports that dose has been increased).  Still smoking about 7-8 cigarettes per day.  Not vaping at this time.    I have discussed the limitations of a virtual visit with the patient including the fact that there are no vital signs and no way to fully examine the patient.  This could lead to misdiagnosis and possibly to delays in appropriate care.    The physical exam in this note is pulled forward " from prior visits.  It will be updated based on any findings which I can discern based on seeing the patient on a video screen.  I will not eliminate findings from prior exams at this visit.    11/11/2020 - Here for follow up, has been getting regular hizentra (weekly) infusions and has some continued chronic cough with some brownish sputum but does feel better overall with the infusions.  Still smoking but mostly less than 1/2 PPD.  Last CXR 6/2020 was OK.  Patient has no known corona virus exposures and has been practicing social distancing.  We have discussed the virus and precautions and all questions have been answered.    5/12/2021 - Here for follow up, coughing has been about the same to better.  Still continuing with Hizentra infusions.  Still smoking but has had some periods without smoking but her home situation is stressful and there are smokers.  Patient has no known corona virus exposures and has been practicing social distancing.  We have discussed the virus and precautions and all questions have been answered.  Got Covid vaccine (Pfizer).    11/10/2021 - Here for follow up,  Has not been on TRELEGY in a while.  Since last visit was sick one time and has resolved.  She is still smoking and is up and down with it.  Still with a lot of stress at home.  She wants to try and see how she does without a controlling medication since she has not been using one.  Still has some anxiety.  Patient has no known corona virus exposures and has been practicing social distancing.  We have discussed the virus and precautions and all questions have been answered.  She has had Covid vaccine (Pfizer and more recently had 2 doses of Moderna)    5/10/2022 - Here for follow up,  Pt is currently stable on present medications with no recent increases in their symptoms or use of rescue medications.  Since our last visit there have been no hospitalizations or ER visits for their respiratory issues and there does not seem to be  anything to suggest unrecognized exacerbations.  I have reviewed the medical regimen and re-educated the pt on the role of rescue and controlling medications.  Inhaler technique and understanding seems adequate.  The patient reports no issues with any of there medications for their COPD.  Refills will be taken care of as needed.  All questions answered.  Patient has no known corona virus exposures and has been practicing social distancing.  We have discussed the virus and precautions and all questions have been answered.    11/10/2022 - Here for follow up, under a lot of stress with her mother (now in hospital). Breathing not as good because of her stress.  No other new issues.   HAs increased smoking to > 1 PPD (we discussed this).    5/11/2023 - Here for follow up, she tells me that she was exposed to TB in the past and had a + PPD and was treated for 6 months.   Pt is currently stable on present medications with no recent increases in their symptoms or use of rescue medications.  Since our last visit there have been no hospitalizations or ER visits for their respiratory issues and there does not seem to be anything to suggest unrecognized exacerbations.  I have reviewed the medical regimen and re-educated the pt on the role of rescue and controlling medications.  Inhaler technique and understanding seems adequate.  The patient reports no issues with any of there medications for their COPD.  Refills will be taken care of as needed.  All questions answered.  She is planning to get repeat bivalent Covid vaccine.  She needs refills of some of her heart meds.  She is still smoking about the same.        COPD    GOLD A    Last PFT - 2/10/17  FEV1- 90 % DLCO - 59 %     + LABA/LAMA/ICS/daliresp    + prn ALBUTEROL    mMRC -  0 - SOB with strenuous exercise   - + 1 - SOB level ground, slight hill   -  2 - SOB walk slower or stop for breath level ground   -  3 - SOB at 100 yards or after few minutes   -  4 - SOB in house,  "dressing    Referral to PULMONARY REHABILITATION -   NO    Tested for alpha-1-antitrypsin - NO    Cigarette Counseling    Currently smoking about 1 packs per day  30 pack years    I have counseled pt for 3-5 minutes regarding cigarette cessation.  This has included the need to stop smoking as well as strategies, including but not limited to "cold turkey", CHANTIX (including risks and benefits of kasia drug), nicotine replacement and WELLBUTRIN.      COPD  Associated symptoms include coughing. Pertinent negatives include no abdominal pain, arthralgias, chest pain, chills, congestion, fatigue, fever, headaches, myalgias, nausea, numbness, sore throat or weakness.   Follow-up  Associated symptoms include coughing. Pertinent negatives include no abdominal pain, arthralgias, chest pain, chills, congestion, fatigue, fever, headaches, myalgias, nausea, numbness, sore throat or weakness.   Review of Systems   Constitutional:  Negative for activity change, appetite change, chills, fatigue and fever.   HENT:  Negative for congestion, hearing loss, nosebleeds, postnasal drip, sneezing and sore throat.    Respiratory:  Positive for cough and shortness of breath. Negative for apnea, choking, chest tightness, wheezing and stridor.    Cardiovascular:  Negative for chest pain, palpitations and leg swelling.   Gastrointestinal:  Negative for abdominal distention, abdominal pain, constipation, diarrhea and nausea.   Genitourinary:  Negative for dysuria, frequency and urgency.   Musculoskeletal:  Negative for arthralgias and myalgias.   Neurological:  Negative for dizziness, tremors, seizures, syncope, facial asymmetry, speech difficulty, weakness, light-headedness, numbness and headaches.   Hematological:  Negative for adenopathy.   Psychiatric/Behavioral:  Negative for dysphoric mood. The patient is nervous/anxious.      Objective:       Vitals:    05/11/23 1058   BP: 132/80   BP Location: Left arm   Patient Position: Sitting   BP " Method: Medium (Manual)   Pulse: 98   SpO2: 96%   Weight: 87.1 kg (192 lb)         Physical Exam  Vitals and nursing note reviewed.   Constitutional:       General: She is not in acute distress.     Appearance: She is well-developed. She is not diaphoretic.   HENT:      Head: Normocephalic and atraumatic.      Nose: Nose normal.   Eyes:      Conjunctiva/sclera: Conjunctivae normal.      Pupils: Pupils are equal, round, and reactive to light.   Neck:      Thyroid: No thyromegaly.      Vascular: No JVD.      Trachea: No tracheal deviation.   Cardiovascular:      Rate and Rhythm: Normal rate and regular rhythm.      Heart sounds: Normal heart sounds. No murmur heard.    No friction rub. No gallop.   Pulmonary:      Effort: Pulmonary effort is normal. No respiratory distress.      Breath sounds: Normal breath sounds. No stridor. No wheezing or rales.   Chest:      Chest wall: No tenderness.   Abdominal:      General: Bowel sounds are normal. There is no distension.      Palpations: Abdomen is soft.      Tenderness: There is no abdominal tenderness.   Musculoskeletal:         General: No tenderness. Normal range of motion.      Cervical back: Normal range of motion and neck supple.   Lymphadenopathy:      Cervical: No cervical adenopathy.   Skin:     General: Skin is warm and dry.   Neurological:      Mental Status: She is alert and oriented to person, place, and time.      Cranial Nerves: No cranial nerve deficit.   Psychiatric:         Behavior: Behavior normal.       Assessment:       No diagnosis found.    Plan:       Problem List Items Addressed This Visit          Pulmonary    Recurrent pneumonia  - stable at this time      Chronic obstructive pulmonary disease  - continue present medications  - needs to stop smoking, no vaping  - continue present meds  - RTC 6 months         Other    Personal history of tobacco use, presenting hazards to health  - d/w pt about the need to stop    Immunoglobulin deficiency  - per  Dr Vasquez  - gets weekly hizentra infusions             Darrel Page MD

## 2023-08-07 ENCOUNTER — TELEPHONE (OUTPATIENT)
Dept: OBSTETRICS AND GYNECOLOGY | Facility: CLINIC | Age: 53
End: 2023-08-07
Payer: MEDICAID

## 2023-08-07 NOTE — TELEPHONE ENCOUNTER
----- Message from Shelley Land sent at 8/7/2023 12:54 PM CDT -----  Regarding: advise  Contact: PATIENT  Type: Needs Medical Advice  Who Called:  patient  Symptoms (please be specific):  gaby chen 02/07/2004 MRN:  [id number]  How long has patient had these symptoms:    Pharmacy name and phone #:    Best Call Back Number: [phone]    Additional Information: Wants to know if the following can join her apt to get her breast checked; please call to advise thanks!

## 2023-08-07 NOTE — TELEPHONE ENCOUNTER
Pt notified that we placed daughter on the schedule same as her appt and she voiced understanding.

## 2023-08-15 ENCOUNTER — TELEPHONE (OUTPATIENT)
Dept: OBSTETRICS AND GYNECOLOGY | Facility: CLINIC | Age: 53
End: 2023-08-15
Payer: MEDICAID

## 2023-08-15 NOTE — TELEPHONE ENCOUNTER
----- Message from Kristine Ryan sent at 8/15/2023  8:43 AM CDT -----  Type: Needs Medical Advice  Who Called:  pt  Best Call Back Number: [phone]  Additional Information: pt is requesting to reschedule her appt on 8/16 @1pm to a different date due to being sick possible food poising, pl call bk to advise thanks

## 2023-08-23 ENCOUNTER — OFFICE VISIT (OUTPATIENT)
Dept: OBSTETRICS AND GYNECOLOGY | Facility: CLINIC | Age: 53
End: 2023-08-23
Payer: MEDICAID

## 2023-08-23 VITALS
BODY MASS INDEX: 29.51 KG/M2 | WEIGHT: 194.69 LBS | RESPIRATION RATE: 18 BRPM | HEIGHT: 68 IN | DIASTOLIC BLOOD PRESSURE: 88 MMHG | SYSTOLIC BLOOD PRESSURE: 132 MMHG

## 2023-08-23 DIAGNOSIS — Z12.11 SCREENING FOR COLON CANCER: ICD-10-CM

## 2023-08-23 DIAGNOSIS — R10.2 PELVIC PAIN: ICD-10-CM

## 2023-08-23 DIAGNOSIS — Z12.4 SCREENING FOR MALIGNANT NEOPLASM OF CERVIX: ICD-10-CM

## 2023-08-23 DIAGNOSIS — Z01.419 WELL WOMAN EXAM WITH ROUTINE GYNECOLOGICAL EXAM: Primary | ICD-10-CM

## 2023-08-23 PROBLEM — F43.9 STRESS AT HOME: Status: RESOLVED | Noted: 2021-02-23 | Resolved: 2023-08-23

## 2023-08-23 PROBLEM — Z98.890 HISTORY OF MAJOR ABDOMINAL SURGERY: Status: RESOLVED | Noted: 2017-05-12 | Resolved: 2023-08-23

## 2023-08-23 PROBLEM — Z90.81 HISTORY OF SPLENECTOMY: Status: RESOLVED | Noted: 2017-05-12 | Resolved: 2023-08-23

## 2023-08-23 PROBLEM — R06.02 SOB (SHORTNESS OF BREATH): Status: RESOLVED | Noted: 2020-06-09 | Resolved: 2023-08-23

## 2023-08-23 PROBLEM — R05.9 COUGH: Status: RESOLVED | Noted: 2019-02-05 | Resolved: 2023-08-23

## 2023-08-23 PROBLEM — R07.9 CHEST PAIN: Status: RESOLVED | Noted: 2020-06-09 | Resolved: 2023-08-23

## 2023-08-23 PROCEDURE — 3079F DIAST BP 80-89 MM HG: CPT | Mod: CPTII,,, | Performed by: OBSTETRICS & GYNECOLOGY

## 2023-08-23 PROCEDURE — 3008F PR BODY MASS INDEX (BMI) DOCUMENTED: ICD-10-PCS | Mod: CPTII,,, | Performed by: OBSTETRICS & GYNECOLOGY

## 2023-08-23 PROCEDURE — 99999 PR PBB SHADOW E&M-EST. PATIENT-LVL III: ICD-10-PCS | Mod: PBBFAC,,, | Performed by: OBSTETRICS & GYNECOLOGY

## 2023-08-23 PROCEDURE — 3075F SYST BP GE 130 - 139MM HG: CPT | Mod: CPTII,,, | Performed by: OBSTETRICS & GYNECOLOGY

## 2023-08-23 PROCEDURE — 99999 PR PBB SHADOW E&M-EST. PATIENT-LVL III: CPT | Mod: PBBFAC,,, | Performed by: OBSTETRICS & GYNECOLOGY

## 2023-08-23 PROCEDURE — 88175 CYTOPATH C/V AUTO FLUID REDO: CPT | Performed by: OBSTETRICS & GYNECOLOGY

## 2023-08-23 PROCEDURE — 87624 HPV HI-RISK TYP POOLED RSLT: CPT | Performed by: OBSTETRICS & GYNECOLOGY

## 2023-08-23 PROCEDURE — 99213 OFFICE O/P EST LOW 20 MIN: CPT | Mod: PBBFAC,PO | Performed by: OBSTETRICS & GYNECOLOGY

## 2023-08-23 PROCEDURE — 3075F PR MOST RECENT SYSTOLIC BLOOD PRESS GE 130-139MM HG: ICD-10-PCS | Mod: CPTII,,, | Performed by: OBSTETRICS & GYNECOLOGY

## 2023-08-23 PROCEDURE — 3008F BODY MASS INDEX DOCD: CPT | Mod: CPTII,,, | Performed by: OBSTETRICS & GYNECOLOGY

## 2023-08-23 PROCEDURE — 99396 PR PREVENTIVE VISIT,EST,40-64: ICD-10-PCS | Mod: S$PBB,,, | Performed by: OBSTETRICS & GYNECOLOGY

## 2023-08-23 PROCEDURE — 99396 PREV VISIT EST AGE 40-64: CPT | Mod: S$PBB,,, | Performed by: OBSTETRICS & GYNECOLOGY

## 2023-08-23 PROCEDURE — 3079F PR MOST RECENT DIASTOLIC BLOOD PRESSURE 80-89 MM HG: ICD-10-PCS | Mod: CPTII,,, | Performed by: OBSTETRICS & GYNECOLOGY

## 2023-08-23 RX ORDER — BUSPIRONE HYDROCHLORIDE 10 MG/1
10 TABLET ORAL 3 TIMES DAILY
COMMUNITY
Start: 2023-08-03

## 2023-08-23 NOTE — PROGRESS NOTES
Chief Complaint   Patient presents with    Well Woman     WWE, mammo completed at [facility]       History and Physical:  Patient's last menstrual period was 2023 (exact date).    Contraception:  Bilateral tubal ligation    Date: 2023    Araceli Ibarra is a 52 y.o.  who presents today for her routine annual GYN exam.     The patient reports no changes in her medical or surgical history since her last evaluation.    She does report a recent episode of sharp right lower quadrant pain associated with the onset of her most recent menses.  Over the last year her menstrual cycle has become heavier.    She reports a mammogram last month at [facility] but the results are not known.  She is due for colon cancer screening.    Allergies:   Review of patient's allergies indicates:   Allergen Reactions    Levaquin [levofloxacin] Other (See Comments)     Tendonitis. Leg pain.    Quinazolinones Other (See Comments)     Tendonitis. Leg pain.      Penicillins Other (See Comments)     Unknown reaction as a child per mother. States she has taken amoxicillin with out reaction.      Theophylline-guaifenesin     Cephalosporins Hives    Fluconazole Nausea And Vomiting and Other (See Comments)    Sulfa (sulfonamide antibiotics) Hives       Past Medical History:   Diagnosis Date    Abnormal Pap smear of cervix     Anxiety     Back spasm     Chest pain     Chronic bronchitis     Chronic neck and back pain     Chronic pain     COPD (chronic obstructive pulmonary disease)     Depression     Emphysema/COPD     Lump in neck     PNA (pneumonia)     Thyroid disease        Past Surgical History:   Procedure Laterality Date    CERVICAL CONIZATION   W/ LASER      INTRAVENOUS INFUSION      LEFT HEART CATHETERIZATION Left 2020    Procedure: CATHETERIZATION, HEART, LEFT;  Surgeon: Edis Peñaloza MD;  Location: [facility] CATH/EP LAB;  Service: Cardiology;  Laterality: Left;    PANCREAS SURGERY      Partial removal r/t cyst.    SKIN GRAFT Right      Arm    SPLENECTOMY, PARTIAL      TONSILLECTOMY      TUBAL LIGATION         MEDS:   Current Outpatient Medications:     albuterol (PROVENTIL) 2.5 mg /3 mL (0.083 %) nebulizer solution, Take 3 mLs (2.5 mg total) by nebulization every 6 (six) hours as needed for Wheezing. Rescue, Disp: 120 mL, Rfl: 5    albuterol (PROVENTIL/VENTOLIN HFA) 90 mcg/actuation inhaler, Inhale 2 puffs into the lungs every 6 (six) hours as needed. Rescue, Disp: 18 g, Rfl: 5    amitriptyline (ELAVIL) 100 MG tablet, amitriptyline 100 mg tablet  TAKE 1 TABLET BY MOUTH AT BEDTIME, Disp: , Rfl:     aspirin (ECOTRIN) 81 MG EC tablet, 1 tablet, Disp: , Rfl:     baclofen (LIORESAL) 20 MG tablet, Take 20 mg by mouth 3 (three) times daily. , Disp: , Rfl:     budesonide (PULMICORT) 0.5 mg/2 mL nebulizer solution, Take 2 mLs (0.5 mg total) by nebulization 2 (two) times daily. Controller, Disp: 120 mL, Rfl: 5    busPIRone (BUSPAR) 10 MG tablet, Take 10 mg by mouth 3 (three) times daily., Disp: , Rfl:     diphenhydrAMINE (BENADRYL ALLERGY) 25 mg tablet, Take 1 tablet (25 mg total) by mouth nightly as needed for Itching. (Patient taking differently: Take 25 mg by mouth nightly as needed (Taken with Hizentra.).), Disp: 30 tablet, Rfl: 6    EScitalopram oxalate (LEXAPRO) 10 MG tablet, Take 20 mg by mouth once daily., Disp: , Rfl:     gabapentin (NEURONTIN) 800 MG tablet, Take 800 mg by mouth 2 (two) times daily., Disp: , Rfl: 1    HYDROcodone-acetaminophen (NORCO)  mg per tablet, Take 1 tablet by mouth 3 (three) times daily. , Disp: , Rfl:     immun glob G,IgG,-pro-IgA 0-50 (HIZENTRA) 10 gram/50 mL (20 %) Soln, Inject 80 mLs (16 g total) into the skin once a week., Disp: 320 mL, Rfl: 12    isosorbide mononitrate (IMDUR) 30 MG 24 hr tablet, Take 1 tablet (30 mg total) by mouth once daily., Disp: 30 tablet, Rfl: 5    levothyroxine 100 mcg Cap, Take 100 mcg by mouth once daily. , Disp: , Rfl:     lidocaine (LIDODERM) 5 %, Place 2 patches onto the skin once  daily. , Disp: , Rfl:     lidocaine-prilocaine (EMLA) cream, Apply topically as needed. (Patient taking differently: Apply 1 each topically as needed (For infusions.).), Disp: 30 g, Rfl: 3    metoprolol succinate 50 mg CSpX, Take 1 capsule by mouth 2 (two) times a day. (Patient taking differently: Take 1 capsule by mouth 3 (three) times daily.), Disp: 60 capsule, Rfl: 5    morphine (MS CONTIN) 15 MG 12 hr tablet, Take 15 mg by mouth 2 (two) times daily. , Disp: , Rfl:     multivit-min-iron-FA-lutein 8 mg iron-400 mcg-300 mcg Tab, , Disp: , Rfl:     nitroGLYCERIN (NITROSTAT) 0.4 MG SL tablet, nitroglycerin 0.4 mg sublingual tablet  PLACE 1 TABLET UNDER THE TONGUE AS NEEDED EVERY 5 MINUTES AS NEEDED FOR CHEST PAIN MAY REPEAT UP TO 3 DOSES IF PAIN PERSISTES GO TO ER, Disp: , Rfl:     QUEtiapine (SEROQUEL) 200 MG Tab, Take 200 mg by mouth every evening. , Disp: , Rfl: 0    rosuvastatin (CRESTOR) 20 MG tablet, Take 20 mg by mouth every evening. , Disp: , Rfl:     sumatriptan (IMITREX) 25 MG Tab, Take 25 mg by mouth daily as needed. , Disp: , Rfl:     temazepam (RESTORIL) 30 mg capsule, Take 30 mg by mouth every evening. , Disp: , Rfl: 2     OB History          7    Para   5    Term           5    AB   2    Living   5         SAB        IAB        Ectopic        Multiple        Live Births   5           Obstetric Comments    Pre term vaginal delivery x5 with EAB x2               Social History     Socioeconomic History    Marital status:    Tobacco Use    Smoking status: Every Day     Current packs/day: 0.50     Types: Cigarettes, Vaping with nicotine    Smokeless tobacco: Never   Substance and Sexual Activity    Alcohol use: Not Currently    Drug use: No    Sexual activity: Not Currently       Family History   Problem Relation Age of Onset    Heart disease Mother     Diabetes Mother     Heart disease Father        Past medical and surgical history reviewed.   I have reviewed the patient's medical  "history in detail and updated the computerized patient record.    Review of Systems (at today's evaluation)  Review of Systems   Constitutional:  Negative for fever and unexpected weight change.   Respiratory:  Negative for cough and shortness of breath.    Cardiovascular:  Negative for chest pain.   Gastrointestinal:  Negative for constipation, diarrhea and nausea.   Genitourinary:  Negative for dysuria and frequency.          GYN AS PER HPI   Musculoskeletal: Negative.    Skin: Negative.    Neurological: Negative.         Physical Exam:   /88 (BP Location: Left arm, Patient Position: Sitting, BP Method: Medium (Manual))   Resp 18   Ht 5' 8" (1.727 m)   Wt 88.3 kg (194 lb 10.7 oz)   LMP 08/09/2023 (Exact Date)   BMI 29.60 kg/m²       Physical Exam:   Constitutional: She appears well-developed and well-nourished.    HENT:   Head: Normocephalic.     Neck: No thyroid mass present.    Cardiovascular:  Normal rate.             Pulmonary/Chest: Effort normal. Right breast exhibits no mass, no nipple discharge and no skin change. Left breast exhibits no mass, no nipple discharge and no skin change.        Abdominal: Soft. There is no abdominal tenderness.     Genitourinary:    Inguinal canal, vagina, uterus, right adnexa and left adnexa normal.      Pelvic exam was performed with patient supine.   The external female genitalia was normal.   No external genitalia lesions identified,Cervix is normal. Right adnexum displays no mass and no tenderness. Left adnexum displays no mass and no tenderness. No tenderness or bleeding in the vagina. Uterus is not tender. Normal urethral meatus.Urethra findings: no tendernessBladder findings: no bladder tenderness          Musculoskeletal:      Right lower leg: No edema.      Left lower leg: No edema.      Lymphadenopathy:     She has no cervical adenopathy. No inguinal adenopathy noted on the right or left side.    Neurological: She is alert.   No gross defects noted  "   Skin: Skin is warm and dry.    Psychiatric: Mood normal.        Assessment:        1. Well woman exam with routine gynecological exam    2. Screening for malignant neoplasm of cervix    3. Screening for colon cancer    4. Pelvic pain         Plan:      Well woman exam with routine gynecological exam    Screening for malignant neoplasm of cervix  -     HPV High Risk Genotypes, PCR  -     Liquid-Based Pap Smear, Screening    Screening for colon cancer  -     Cancel: Case Request Endoscopy: COLONOSCOPY  -     Ambulatory referral/consult to Gastroenterology; Future; Expected date: 08/30/2023    Pelvic pain  -     US Pelvis Comp with Transvag NON-OB (xpd; Future; Expected date: 08/23/2023       Follow up for ASAP after recommended testing obtained, Follow-up on today's evaluation.     The above was reviewed and discussed with the patient.    Annual exam and screening issues based on the patient's age, medical history and family history were reviewed / discussed.  Routine health maintenance issues were reviewed and discussed.      We will attempt to obtain the results of the patient's most recent mammogram from [facility].  A referral be sent to the patient's previous gastroenterologist (Dr Ge) for colon cancer screening.    In regards to the patient's recent pelvic pain and changes in her menstrual cycle pelvic ultrasound has been ordered to evaluate the uterus endometrium at adnexa.  We will base further evaluation treatment results of the ultrasound findings.    The patient's questions were answered, and she is in agreement with the current plan.     Ron Alves MD  Department OBGYN [facility]

## 2023-08-28 LAB
FINAL PATHOLOGIC DIAGNOSIS: NORMAL
HPV HR 12 DNA SPEC QL NAA+PROBE: NEGATIVE
HPV16 AG SPEC QL: NEGATIVE
HPV18 DNA SPEC QL NAA+PROBE: NEGATIVE
Lab: NORMAL

## 2023-09-21 ENCOUNTER — TELEPHONE (OUTPATIENT)
Dept: ALLERGY | Facility: CLINIC | Age: 53
End: 2023-09-21

## 2023-09-21 NOTE — TELEPHONE ENCOUNTER
Patient is complaining of profusely sweating every day, infusion nurse wants to know if there is any pre meds she can give before that, or would slowing down the rate help, or any recommendations to help?     [phone] Lili

## 2023-09-21 NOTE — TELEPHONE ENCOUNTER
Patient wants to know if she can do the novovax vaccine for covid, it is protein based.   [phone]

## 2023-09-26 RX ORDER — ISOSORBIDE MONONITRATE 30 MG/1
30 TABLET, EXTENDED RELEASE ORAL
Qty: 30 TABLET | Refills: 5 | Status: SHIPPED | OUTPATIENT
Start: 2023-09-26 | End: 2024-03-28

## 2023-10-11 ENCOUNTER — PATIENT MESSAGE (OUTPATIENT)
Dept: FAMILY MEDICINE | Facility: CLINIC | Age: 53
End: 2023-10-11

## 2023-11-06 RX ORDER — LINACLOTIDE 145 UG/1
145 CAPSULE, GELATIN COATED ORAL EVERY MORNING
COMMUNITY
Start: 2023-09-12 | End: 2024-01-29

## 2023-11-07 ENCOUNTER — OFFICE VISIT (OUTPATIENT)
Dept: PULMONOLOGY | Facility: CLINIC | Age: 53
End: 2023-11-07
Payer: MEDICAID

## 2023-11-07 VITALS
SYSTOLIC BLOOD PRESSURE: 128 MMHG | OXYGEN SATURATION: 94 % | BODY MASS INDEX: 29.94 KG/M2 | WEIGHT: 196.88 LBS | DIASTOLIC BLOOD PRESSURE: 70 MMHG | HEART RATE: 85 BPM

## 2023-11-07 DIAGNOSIS — J18.9 RECURRENT PNEUMONIA: ICD-10-CM

## 2023-11-07 DIAGNOSIS — Z87.891 PERSONAL HISTORY OF TOBACCO USE, PRESENTING HAZARDS TO HEALTH: Primary | ICD-10-CM

## 2023-11-07 DIAGNOSIS — J43.8 OTHER EMPHYSEMA: ICD-10-CM

## 2023-11-07 DIAGNOSIS — D80.1 HYPOGAMMAGLOBULINEMIA: ICD-10-CM

## 2023-11-07 PROCEDURE — 1160F RVW MEDS BY RX/DR IN RCRD: CPT | Mod: CPTII,S$GLB,, | Performed by: INTERNAL MEDICINE

## 2023-11-07 PROCEDURE — 3078F DIAST BP <80 MM HG: CPT | Mod: CPTII,S$GLB,, | Performed by: INTERNAL MEDICINE

## 2023-11-07 PROCEDURE — 3078F PR MOST RECENT DIASTOLIC BLOOD PRESSURE < 80 MM HG: ICD-10-PCS | Mod: CPTII,S$GLB,, | Performed by: INTERNAL MEDICINE

## 2023-11-07 PROCEDURE — 3074F PR MOST RECENT SYSTOLIC BLOOD PRESSURE < 130 MM HG: ICD-10-PCS | Mod: CPTII,S$GLB,, | Performed by: INTERNAL MEDICINE

## 2023-11-07 PROCEDURE — 1159F PR MEDICATION LIST DOCUMENTED IN MEDICAL RECORD: ICD-10-PCS | Mod: CPTII,S$GLB,, | Performed by: INTERNAL MEDICINE

## 2023-11-07 PROCEDURE — 1159F MED LIST DOCD IN RCRD: CPT | Mod: CPTII,S$GLB,, | Performed by: INTERNAL MEDICINE

## 2023-11-07 PROCEDURE — 3074F SYST BP LT 130 MM HG: CPT | Mod: CPTII,S$GLB,, | Performed by: INTERNAL MEDICINE

## 2023-11-07 PROCEDURE — 3008F PR BODY MASS INDEX (BMI) DOCUMENTED: ICD-10-PCS | Mod: CPTII,S$GLB,, | Performed by: INTERNAL MEDICINE

## 2023-11-07 PROCEDURE — 3008F BODY MASS INDEX DOCD: CPT | Mod: CPTII,S$GLB,, | Performed by: INTERNAL MEDICINE

## 2023-11-07 PROCEDURE — 99214 OFFICE O/P EST MOD 30 MIN: CPT | Mod: S$GLB,,, | Performed by: INTERNAL MEDICINE

## 2023-11-07 PROCEDURE — 99214 PR OFFICE/OUTPT VISIT, EST, LEVL IV, 30-39 MIN: ICD-10-PCS | Mod: S$GLB,,, | Performed by: INTERNAL MEDICINE

## 2023-11-07 PROCEDURE — 1160F PR REVIEW ALL MEDS BY PRESCRIBER/CLIN PHARMACIST DOCUMENTED: ICD-10-PCS | Mod: CPTII,S$GLB,, | Performed by: INTERNAL MEDICINE

## 2023-11-07 RX ORDER — BUDESONIDE AND FORMOTEROL FUMARATE DIHYDRATE 160; 4.5 UG/1; UG/1
2 AEROSOL RESPIRATORY (INHALATION) EVERY 12 HOURS
Qty: 10.2 G | Refills: 5 | Status: SHIPPED | OUTPATIENT
Start: 2023-11-07 | End: 2024-11-06

## 2023-11-07 NOTE — PROGRESS NOTES
"    Subjective:       Patient ID: Araceli Ibarra is a 53 y.o. female.    Chief Complaint: Follow-up (6 month follow up/Patient has SOB, cough with clear to brownish mucous)      5/17/2017 - Here for follow up, overall doing much better with ANORO.  Did get some chest wall injury at a fair and has some chest discomfort as a result.  She continues to work on stopping smoking. No other new complaints    1/21/2017 - Here for follow up, still smoking (lots of smokers in the house).  She is currently using cigarettes and a VAPE.  Has dyspnea (primarily with exertion), has daily cough (mucus currently brown - her usual).  She does feel that her breathing is worse over the last month or so.  Taking medications regularly.  Working on stopping smoking.    5/22/2018 - Here for follow up has been on TRELEGY for 4-6 months, still with problems with cough, congestion which has not been much better.  Still smoking (d/w her)., still using VAPE.  Mother has had similar problems.  Has had elevated WBC and is going to see hematologist.  Denies much nasal congestion, sputum is described as a "caramel" color.  No recent CXR.    7/31/2018 - Here for follow up, feels ok for now, has chronic cough but not worse.  Has chronic dyspnea (not worse).  Still smoking about 1/2 PPD.  Reviewed CXR, CT  And sinus films with pt.  No other new issues.    10/30/2018 - Here for follow up, has been getting immunoglobulin infusions.  Smoking about 6 cig/day.  Overall doing ok, no recent pneumonia, no recent ER visits or hospitalizations.  Sputum is still described as brown but not as much as usual.  Has also been approved for DALIRESP.and started about 2 weeks ago.    2/5/2019 - Here for follow up, still with daily mucus (white to a little green).  Breathing has been OK.  Usually sinuses are not as much of a problem.  No reflux or heartburn.  Has been on daliresp for about 4 months and on TRELEGY.  Still smoking (was as high as 1 PPD but back to < 1/2 " "PPD).  Getting IVIG and hasn't had pneumonia since.  No hemoptysis.    5/7/2019 - Here for follow up, overall stable on present meds (TRELEGY and DALIRESP - she has been on numerous other regimens and this has been the best at controlling her symptoms).  Pt is currently stable on present medications with no recent increases in their symptoms or use of rescue medications.  I have reviewed the medical regimen and re-educated the pt on the role of rescue and controlling medications.  All questions answered.  Inhaler technique seems adequate.  Still getting immunoglobulin infusions (dose has been increased recently).  Still smoking anywhere from (1/4 - 3/4 PPD) - lives in house with multiple smokers.  CT scan showed COPD but no acute changes.  Sputum culture and AFB were negative.     11/7/2019 - Had URI and temp to 104, CXR was OK, treated with antibiotics and feels better.  She does feel that she "let it go too far".  Has had issues with exposures to things, AC went out, still with a lot of stress.  Still smoking about < 1/2 PPD but has second hand exposure as well.  Getting infusions from Dr Vasquez.  Does not vape at this time.  Had a fall at [employer] and had some back injury which has slowed her down.    5/7/2020 - Virtual Visit    The chief complaint leading to consultation is: follow up  The patient location is:  in car  Visit type: Virtual visit with synchronous audio and video    There was a delay in getting some of her hizentra infusion and she felt that she was having more trouble but since restarting she feels that she is better (reports that dose has been increased).  Still smoking about 7-8 cigarettes per day.  Not vaping at this time.    I have discussed the limitations of a virtual visit with the patient including the fact that there are no vital signs and no way to fully examine the patient.  This could lead to misdiagnosis and possibly to delays in appropriate care.    The physical exam in this note is " pulled forward from prior visits.  It will be updated based on any findings which I can discern based on seeing the patient on a video screen.  I will not eliminate findings from prior exams at this visit.    11/11/2020 - Here for follow up, has been getting regular hizentra (weekly) infusions and has some continued chronic cough with some brownish sputum but does feel better overall with the infusions.  Still smoking but mostly less than 1/2 PPD.  Last CXR 6/2020 was OK.  Patient has no known corona virus exposures and has been practicing social distancing.  We have discussed the virus and precautions and all questions have been answered.    5/12/2021 - Here for follow up, coughing has been about the same to better.  Still continuing with Hizentra infusions.  Still smoking but has had some periods without smoking but her home situation is stressful and there are smokers.  Patient has no known corona virus exposures and has been practicing social distancing.  We have discussed the virus and precautions and all questions have been answered.  Got Covid vaccine (Pfizer).    11/10/2021 - Here for follow up,  Has not been on TRELEGY in a while.  Since last visit was sick one time and has resolved.  She is still smoking and is up and down with it.  Still with a lot of stress at home.  She wants to try and see how she does without a controlling medication since she has not been using one.  Still has some anxiety.  Patient has no known corona virus exposures and has been practicing social distancing.  We have discussed the virus and precautions and all questions have been answered.  She has had Covid vaccine (Pfizer and more recently had 2 doses of Moderna)    5/10/2022 - Here for follow up,  Pt is currently stable on present medications with no recent increases in their symptoms or use of rescue medications.  Since our last visit there have been no hospitalizations or ER visits for their respiratory issues and there does  not seem to be anything to suggest unrecognized exacerbations.  I have reviewed the medical regimen and re-educated the pt on the role of rescue and controlling medications.  Inhaler technique and understanding seems adequate.  The patient reports no issues with any of there medications for their COPD.  Refills will be taken care of as needed.  All questions answered.  Patient has no known corona virus exposures and has been practicing social distancing.  We have discussed the virus and precautions and all questions have been answered.    11/10/2022 - Here for follow up, under a lot of stress with her mother (now in hospital). Breathing not as good because of her stress.  No other new issues.   HAs increased smoking to > 1 PPD (we discussed this).    5/11/2023 - Here for follow up, she tells me that she was exposed to TB in the past and had a + PPD and was treated for 6 months.   Pt is currently stable on present medications with no recent increases in their symptoms or use of rescue medications.  Since our last visit there have been no hospitalizations or ER visits for their respiratory issues and there does not seem to be anything to suggest unrecognized exacerbations.  I have reviewed the medical regimen and re-educated the pt on the role of rescue and controlling medications.  Inhaler technique and understanding seems adequate.  The patient reports no issues with any of there medications for their COPD.  Refills will be taken care of as needed.  All questions answered.  She is planning to get repeat bivalent Covid vaccine.  She needs refills of some of her heart meds.  She is still smoking about the same.    11/7/2023 - Here for follow up, Pt is currently stable on present medications with no recent increases in their symptoms or use of rescue medications.  Since our last visit there have been no hospitalizations or ER visits for their respiratory issues and there does not seem to be anything to suggest  "unrecognized exacerbations.  I have reviewed the medical regimen and re-educated the pt on the role of rescue and controlling medications.  Inhaler technique and understanding seems adequate.  The patient reports no issues with any of there medications for their COPD.  Refills will be taken care of as needed.  All questions answered.  Continues with daily cough and congestion but is still smoking 1-1.5 PPD - we discussed this.  She continues with her infusions and feels that it helps with her recurrent infections.  She is not vaping at all now.  She has been on TRELEGY in the past and she did not notice any significant change in her symptoms.  We could not get insurance to cover DALIRESP in the past.        COPD    GOLD A    Last PFT - 5/21  FEV1- 94 % DLCO - 62 %     + LABA/LAMA/ICS/daliresp    + prn ALBUTEROL    mMRC -  0 - SOB with strenuous exercise   - + 1 - SOB level ground, slight hill   -  2 - SOB walk slower or stop for breath level ground   -  3 - SOB at 100 yards or after few minutes   -  4 - SOB in house, dressing    Referral to PULMONARY REHABILITATION -   NO    Tested for alpha-1-antitrypsin - NO    Cigarette Counseling    Currently smoking about 1 packs per day  30 pack years    I have counseled pt for 3-5 minutes regarding cigarette cessation.  This has included the need to stop smoking as well as strategies, including but not limited to "cold turkey", CHANTIX (including risks and benefits of kasia drug), nicotine replacement and WELLBUTRIN.      COPD  Associated symptoms include coughing. Pertinent negatives include no abdominal pain, arthralgias, chest pain, chills, congestion, fatigue, fever, headaches, myalgias, nausea, numbness, sore throat or weakness.   Follow-up  Associated symptoms include coughing. Pertinent negatives include no abdominal pain, arthralgias, chest pain, chills, congestion, fatigue, fever, headaches, myalgias, nausea, numbness, sore throat or weakness.     Review of Systems "   Constitutional:  Negative for activity change, appetite change, chills, fatigue and fever.   HENT:  Negative for congestion, hearing loss, nosebleeds, postnasal drip, sneezing and sore throat.    Respiratory:  Positive for cough and shortness of breath. Negative for apnea, choking, chest tightness, wheezing and stridor.    Cardiovascular:  Negative for chest pain, palpitations and leg swelling.   Gastrointestinal:  Negative for abdominal distention, abdominal pain, constipation, diarrhea and nausea.   Genitourinary:  Negative for dysuria, frequency and urgency.   Musculoskeletal:  Negative for arthralgias and myalgias.   Neurological:  Negative for dizziness, tremors, seizures, syncope, facial asymmetry, speech difficulty, weakness, light-headedness, numbness and headaches.   Hematological:  Negative for adenopathy.   Psychiatric/Behavioral:  Negative for dysphoric mood. The patient is nervous/anxious.        Objective:       Vitals:    11/07/23 1038   BP: 128/70   BP Location: Left arm   Patient Position: Sitting   BP Method: Medium (Manual)   Pulse: 85   SpO2: (!) 94%   Weight: 89.3 kg (196 lb 14.4 oz)         Physical Exam  Vitals and nursing note reviewed.   Constitutional:       General: She is not in acute distress.     Appearance: She is well-developed. She is not diaphoretic.   HENT:      Head: Normocephalic and atraumatic.      Nose: Nose normal.   Eyes:      Conjunctiva/sclera: Conjunctivae normal.      Pupils: Pupils are equal, round, and reactive to light.   Neck:      Thyroid: No thyromegaly.      Vascular: No JVD.      Trachea: No tracheal deviation.   Cardiovascular:      Rate and Rhythm: Normal rate and regular rhythm.      Heart sounds: Normal heart sounds. No murmur heard.     No friction rub. No gallop.   Pulmonary:      Effort: Pulmonary effort is normal. No respiratory distress.      Breath sounds: Normal breath sounds. No stridor. No wheezing or rales.   Chest:      Chest wall: No tenderness.    Abdominal:      General: Bowel sounds are normal. There is no distension.      Palpations: Abdomen is soft.      Tenderness: There is no abdominal tenderness.   Musculoskeletal:         General: No tenderness. Normal range of motion.      Cervical back: Normal range of motion and neck supple.   Lymphadenopathy:      Cervical: No cervical adenopathy.   Skin:     General: Skin is warm and dry.   Neurological:      Mental Status: She is alert and oriented to person, place, and time.      Cranial Nerves: No cranial nerve deficit.   Psychiatric:         Behavior: Behavior normal.         Assessment:       No diagnosis found.    Plan:       Problem List Items Addressed This Visit          Pulmonary    Recurrent pneumonia  - stable at this time      Chronic obstructive pulmonary disease  - will try symbicort to see if she has any response  - she does not have COPD by her most recent PFT  - needs to stop smoking, no vaping  - continue present meds  - RTC 6 months         Other    Personal history of tobacco use, presenting hazards to health  - d/w pt about the need to stop    Immunoglobulin deficiency  - per Dr Vasquez  - gets weekly hizentra infusions             Darrel Page MD

## 2024-01-26 NOTE — PROGRESS NOTES
"[facility]OLOGY [facility] -  NEW PATIENT     HISTORY OF PRESENT ILLNESS     Patient ID: Araceli Ibarra is a 53 y.o. female    CC:   Chief Complaint   Patient presents with    Allergies       HPI: Araceli Ibarra is a 53 y.o. 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 Main complaint is sweating episodes even at rest. Feels like she has sweating episodes on a daily basis throughout the day. Unsure if related to starting Hizentra or increased dosages back in 2019. Denies sinus infections, pneumonia, and ear infections. No ER visits and denies hospitalizations. Denies fevers and unexplained weight loss.      REVIEW OF SYSTEMS     CONST: no F/C/NS, no unintentional weight changes  Balance of review of systems negative except as mentioned above     MEDICAL HISTORY     MedHx: active problems reviewed  SurgHx:   Past Surgical History:   Procedure Laterality Date    CERVICAL CONIZATION   W/ LASER      INTRAVENOUS INFUSION      LEFT HEART CATHETERIZATION Left 6/9/2020    Procedure: CATHETERIZATION, HEART, LEFT;  Surgeon: Edis Peñaloza MD;  Location: [facility] CATH/EP LAB;  Service: Cardiology;  Laterality: Left;    PANCREAS SURGERY      Partial removal r/t cyst.    SKIN GRAFT Right     Arm    SPLENECTOMY, PARTIAL      TONSILLECTOMY      TUBAL LIGATION       Allergies: see below  Medications: MAR reviewed       PHYSICAL EXAM     VS: Ht 5' 8" (1.727 m)   Wt 86.1 kg (189 lb 13.1 oz)   BMI 28.86 kg/m²   GENERAL: awake, alert, cooperative with exam  EYES: PERRL, EOMI, no conjunctival injection, no discharge, no infraorbital shiners  EARS: external auditory canals normal B/L  ORAL: MMM, no ulcers, no thrush, no cobblestoning  NECK: supple, trachea midline, no cervical or submandibular LAD  LUNGS: CTAB, no w/r/c, no increased WOB  HEART: Normal Rate and regular rhythm, normal " "S1/S2, no m/g/r  EXTREMITIES: +2 distal pulses, no c/c/e  DERM: no rashes, no skin breaks     LABORATORY STUDIES     Component      Latest Ref Rng 5/18/2020   Immunoglobulin G      586 - 1,602 mg/dL 1,047         CHART REVIEW     Last Trey Note  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     Her last visit was 2/2022     No antibiotics since her last visit.      She is doing well on her increased hizentra dose. 16 grams once per week.       No side effects.      SAD and Igg hypogam - doing well.   did not respond to her pneumonia vaccine and lower igg level.   This is in addition to her hypogam.   Currently infusing 16 grams of hizentra weekly sub q and feels improvement being on the IgG replacement therapy.   600 mg/kg/ month.   She is with [facility]. Receives shipments on time.   Mainly se are fatigue. Can have headaches with infusions.   No headaches with increase in dose.   Needs longer needles had leaking in her legs. Most recent dose.   Infuses in legs and stomach, rotates sites.   Possible 9 mm needle, discussed 12 -14 mm needle. Still has leakage with infusions in legs.   Duration is 1 hour.      Abx:  None since last visit.   Feels that hizentra has stopped pna and the "stabbing pain."     She was given z pack for her pneumonia 2/2019.  She has then had an increase in her hizentra to 14 grams weekly in November 2019.  She had doxycycline 11/2019 and azithromycin 12/2019.  increase 13 grams weekly 5/2019 hizentra due to pneumonia and given abx.   Recurrent infections Onset: 19 yrs old.   Pna: 2 per year since she was 19 yrs old. - recently had azithro for cap outpt . Per report   Sinusitis: none   Otitis: none   She is still having symptoms of cough and mucus production. Always productive from yellow to brown.   She does endorse emphysema as well that may contribute towards proclivity for infection.      Hospitalizations: none for pneumonia - tx outpatient      Of note: she " did have her spleen removed a few years ago. Has had recent ppv 23.      ASSESSMENT/PLAN     Araceli Ibarra is a 53 y.o. female with       1. IgG deficiency    2. Deficiency of anti-pneumococcal polysaccharide antibody    3. Hypogammaglobulinemia      Continue Hizentra 16grams weekly (~735mg/kg/month)  Consider re-checking IgM and IgA at future visits  Consider baseline spirometry  Unclear etiology of sweating, would not discontinie IG replacement      Follow up: 6 months      Walt Fortune MD    I spent a total of 45 minutes on the day of the visit. This includes face to face time and non-face to face time preparing to see the patient (eg, review of tests), obtaining and/or reviewing separately obtained history, documenting clinical information in the electronic or other health record, independently interpreting results and communicating results to the patient/family/caregiver, or care coordinator.

## 2024-01-29 ENCOUNTER — OFFICE VISIT (OUTPATIENT)
Dept: ALLERGY | Facility: CLINIC | Age: 54
End: 2024-01-29
Payer: MEDICAID

## 2024-01-29 VITALS — HEIGHT: 68 IN | BODY MASS INDEX: 28.77 KG/M2 | WEIGHT: 189.81 LBS

## 2024-01-29 DIAGNOSIS — D80.6 DEFICIENCY OF ANTI-PNEUMOCOCCAL POLYSACCHARIDE ANTIBODY: ICD-10-CM

## 2024-01-29 DIAGNOSIS — D80.3 IGG DEFICIENCY: Primary | ICD-10-CM

## 2024-01-29 DIAGNOSIS — D80.1 HYPOGAMMAGLOBULINEMIA: ICD-10-CM

## 2024-01-29 PROCEDURE — 99214 OFFICE O/P EST MOD 30 MIN: CPT | Mod: PBBFAC,PO | Performed by: STUDENT IN AN ORGANIZED HEALTH CARE EDUCATION/TRAINING PROGRAM

## 2024-01-29 PROCEDURE — 1159F MED LIST DOCD IN RCRD: CPT | Mod: CPTII,,, | Performed by: STUDENT IN AN ORGANIZED HEALTH CARE EDUCATION/TRAINING PROGRAM

## 2024-01-29 PROCEDURE — 99999 PR PBB SHADOW E&M-EST. PATIENT-LVL IV: CPT | Mod: PBBFAC,,, | Performed by: STUDENT IN AN ORGANIZED HEALTH CARE EDUCATION/TRAINING PROGRAM

## 2024-01-29 PROCEDURE — 99214 OFFICE O/P EST MOD 30 MIN: CPT | Mod: S$PBB,,, | Performed by: STUDENT IN AN ORGANIZED HEALTH CARE EDUCATION/TRAINING PROGRAM

## 2024-01-29 PROCEDURE — 3008F BODY MASS INDEX DOCD: CPT | Mod: CPTII,,, | Performed by: STUDENT IN AN ORGANIZED HEALTH CARE EDUCATION/TRAINING PROGRAM

## 2024-01-29 RX ORDER — LINACLOTIDE 72 UG/1
72 CAPSULE, GELATIN COATED ORAL
COMMUNITY
Start: 2024-01-08

## 2024-01-29 RX ORDER — LEVOTHYROXINE SODIUM 100 UG/1
100 TABLET ORAL
COMMUNITY
Start: 2023-12-26

## 2024-01-29 RX ORDER — HUMAN IMMUNOGLOBULIN G 0.2 G/ML
16 LIQUID SUBCUTANEOUS WEEKLY
Qty: 320 ML | Refills: 12 | Status: SHIPPED | OUTPATIENT
Start: 2024-01-29 | End: 2024-01-29

## 2024-01-29 RX ORDER — TEMAZEPAM 15 MG/1
30 CAPSULE ORAL NIGHTLY
COMMUNITY
Start: 2024-01-05

## 2024-01-29 RX ORDER — ESCITALOPRAM OXALATE 20 MG/1
20 TABLET ORAL EVERY MORNING
COMMUNITY
Start: 2024-01-23

## 2024-01-29 RX ORDER — METOPROLOL SUCCINATE 50 MG/1
50 TABLET, EXTENDED RELEASE ORAL 3 TIMES DAILY
COMMUNITY
Start: 2024-01-08

## 2024-01-29 RX ORDER — HUMAN IMMUNOGLOBULIN G 0.2 G/ML
16 LIQUID SUBCUTANEOUS WEEKLY
Qty: 320 ML | Refills: 12 | Status: ACTIVE | OUTPATIENT
Start: 2024-01-29

## 2024-01-29 RX ORDER — LORAZEPAM 1 MG/1
TABLET ORAL
COMMUNITY
Start: 2023-12-13

## 2024-02-09 PROBLEM — E07.9 THYROID DISEASE: Status: ACTIVE | Noted: 2024-02-09

## 2024-02-14 ENCOUNTER — TELEPHONE (OUTPATIENT)
Dept: ALLERGY | Facility: CLINIC | Age: 54
End: 2024-02-14
Payer: MEDICAID

## 2024-02-14 NOTE — TELEPHONE ENCOUNTER
----- Message from Jovanni Ramos LPN sent at 2/14/2024 10:02 AM CST -----    ----- Message -----  From: Lenore Saenz  Sent: 2/14/2024   9:19 AM CST  To: [facility] Staff    Type:  Needs Medical Advice    Who Called: pt    Symptoms (please be specific): na     How long has patient had these symptoms:  na    Pharmacy name and phone #:      [facility]- [place], [place] - [address]  [address] [facility] [facility] [address]  Phone: [phone] Fax: [phone]        Would the patient rather a call back or a response via [facility]? Call back    Best Call Back Number: [phone]      Additional Information: pt is calling today to inform the clinic that [facility] has been trying to reach them to get clinic notes in order to be able to get her medication for her infusion. She says she still has not gotten her infusion and was suppose to be done on 2/10.       Please call Back to advise. Thanks!      Spoke to Zoey at [facility] who verified clinical note needed to complete authorization Faxed to [phone]    Brooke Carroll  at [facility] Ph [phone] ext [phone]

## 2024-03-27 ENCOUNTER — OFFICE VISIT (OUTPATIENT)
Dept: DERMATOLOGY | Facility: CLINIC | Age: 54
End: 2024-03-27
Payer: MEDICAID

## 2024-03-27 DIAGNOSIS — L84 CALLUS: ICD-10-CM

## 2024-03-27 DIAGNOSIS — D18.01 CHERRY ANGIOMA: ICD-10-CM

## 2024-03-27 DIAGNOSIS — L82.1 SEBORRHEIC KERATOSIS: ICD-10-CM

## 2024-03-27 DIAGNOSIS — L81.4 LENTIGO: ICD-10-CM

## 2024-03-27 DIAGNOSIS — L81.9 HYPERPIGMENTATION: ICD-10-CM

## 2024-03-27 DIAGNOSIS — D22.9 MULTIPLE BENIGN NEVI: ICD-10-CM

## 2024-03-27 DIAGNOSIS — L91.8 SKIN TAG: ICD-10-CM

## 2024-03-27 DIAGNOSIS — R21 RASH: Primary | ICD-10-CM

## 2024-03-27 PROCEDURE — 1159F MED LIST DOCD IN RCRD: CPT | Mod: CPTII,S$GLB,, | Performed by: STUDENT IN AN ORGANIZED HEALTH CARE EDUCATION/TRAINING PROGRAM

## 2024-03-27 PROCEDURE — 99204 OFFICE O/P NEW MOD 45 MIN: CPT | Mod: S$GLB,,, | Performed by: STUDENT IN AN ORGANIZED HEALTH CARE EDUCATION/TRAINING PROGRAM

## 2024-03-27 PROCEDURE — 1160F RVW MEDS BY RX/DR IN RCRD: CPT | Mod: CPTII,S$GLB,, | Performed by: STUDENT IN AN ORGANIZED HEALTH CARE EDUCATION/TRAINING PROGRAM

## 2024-03-27 RX ORDER — TRIAMCINOLONE ACETONIDE 1 MG/G
CREAM TOPICAL 2 TIMES DAILY
Qty: 80 G | Refills: 1 | Status: SHIPPED | OUTPATIENT
Start: 2024-03-27

## 2024-03-27 NOTE — PROGRESS NOTES
Subjective:      Patient ID:  Araceli Ibarra is a 53 y.o. female who presents for   Chief Complaint   Patient presents with    Skin Check     [facility]     New Patient     Patient is coming in today to [facility]. States that she has a spot on left calf that she is concerned about. She also states she has a small spot of eczema on her left chest that stays bruised from scratching since she was 18.     Derm Hx   Denies Phx NMSC  Denies Fhx MM  Mother has had a skin cancer unsure of which kind      Review of Systems   Constitutional:  Negative for fever, chills and fatigue.   Respiratory:  Negative for cough and shortness of breath.    Gastrointestinal:  Negative for nausea, vomiting and diarrhea.   Skin:  Negative for daily sunscreen use and activity-related sunscreen use.   Hematologic/Lymphatic: Bruises/bleeds easily (asa).       Objective:   Physical Exam   Constitutional: She appears well-developed and well-nourished. No distress.   Neurological: She is alert and oriented to person, place, and time. She is not disoriented.   Psychiatric: She has a normal mood and affect.   Skin:   Areas Examined (abnormalities noted in diagram):   Scalp / Hair Palpated and Inspected  Head / Face Inspection Performed  Neck Inspection Performed  Chest / Axilla Inspection Performed  Abdomen Inspection Performed  Genitals / Buttocks / Groin Inspection Performed  Back Inspection Performed  RUE Inspected  LUE Inspection Performed  RLE Inspected  LLE Inspection Performed  Nails and Digits Inspection Performed                     Diagram Legend     Erythematous scaling macule/papule c/w actinic keratosis       Vascular papule c/w angioma      Pigmented verrucoid papule/plaque c/w seborrheic keratosis      Yellow umbilicated papule c/w sebaceous hyperplasia      Irregularly shaped tan macule c/w lentigo     1-2 mm smooth white papules consistent with Milia      Movable subcutaneous cyst with punctum c/w epidermal inclusion cyst      Subcutaneous  movable cyst c/w pilar cyst      Firm pink to brown papule c/w dermatofibroma      Pedunculated fleshy papule(s) c/w skin tag(s)      Evenly pigmented macule c/w junctional nevus     Mildly variegated pigmented, slightly irregular-bordered macule c/w mildly atypical nevus      Flesh colored to evenly pigmented papule c/w intradermal nevus       Pink pearly papule/plaque c/w basal cell carcinoma      Erythematous hyperkeratotic cursted plaque c/w SCC      Surgical scar with no sign of skin cancer recurrence      Open and closed comedones      Inflammatory papules and pustules      Verrucoid papule consistent consistent with wart     Erythematous eczematous patches and plaques     Dystrophic onycholytic nail with subungual debris c/w onychomycosis     Umbilicated papule    Erythematous-base heme-crusted tan verrucoid plaque consistent with inflamed seborrheic keratosis     Erythematous Silvery Scaling Plaque c/w Psoriasis     See annotation      Assessment / Plan:        Rash  Hyperpigmentation  -     triamcinolone acetonide 0.1% (KENALOG) 0.1 % cream; Apply topically 2 (two) times daily.  Dispense: 80 g; Refill: 1  Left chest/breast- no scale, hyperpigmented patch- dx: eczema vs. Dysesthesia with pipa  Trial of tac BID x 2-3 weeks, if not improving RTC for biopsy  Avoid rubbing the area    Lentigo  This is a benign hyperpigmented sun induced lesion. Daily sun protection will reduce the number of new lesions. Treatment of these benign lesions are considered cosmetic.    Seborrheic keratosis  These are benign inherited growths without a malignant potential. Reassurance given to patient. No treatment is necessary.     Multiple benign nevi  Careful dermoscopy evaluation of nevi performed with none identified as needing biopsy today  Monitor for new mole or moles that are becoming bigger, darker, irritated, or developing irregular borders.   Total body skin examination performed today including at least 12 points as noted  in physical examination. No lesions suspicious for malignancy noted.  Patient instructed in importance in daily broad spectrum sun protection of at least spf 30. Mineral sunscreen ingredients preferred (Zinc +/- Titanium) and can be found OTC.   Patient encouraged to wear hat for all outdoor exposure.   Also discussed sun avoidance and use of protective clothing.    Callus  Right 2nd digit  Unclear etiology- denies rubbing the area, denies repetitive motion with finger  Offered biopsy given concern, she declines  Offered urea 40% + sal acid cream- she declines  Not concerning for cancer at this time    Skin tag  - discussed that lesions are benign, non-cancerous. Removal is cosmetic. Price sheet provided to patient. Discussed risks of scarring and dyspigmentation after removal.     Cherry angioma  This is a benign vascular lesion. Reassurance given. No treatment required.            Rtc if itching does not resolve  No follow-ups on file.

## 2024-03-28 RX ORDER — ISOSORBIDE MONONITRATE 30 MG/1
30 TABLET, EXTENDED RELEASE ORAL
Qty: 30 TABLET | Refills: 0 | Status: SHIPPED | OUTPATIENT
Start: 2024-03-28 | End: 2024-04-29

## 2024-04-29 RX ORDER — ISOSORBIDE MONONITRATE 30 MG/1
30 TABLET, EXTENDED RELEASE ORAL
Qty: 30 TABLET | Refills: 0 | Status: SHIPPED | OUTPATIENT
Start: 2024-04-29

## 2024-05-06 RX ORDER — AMLODIPINE BESYLATE 5 MG/1
5 TABLET ORAL
COMMUNITY
Start: 2024-04-17

## 2024-05-06 RX ORDER — METOPROLOL TARTRATE 50 MG/1
50 TABLET ORAL 3 TIMES DAILY
COMMUNITY
Start: 2024-04-16

## 2024-05-07 ENCOUNTER — OFFICE VISIT (OUTPATIENT)
Dept: PULMONOLOGY | Facility: CLINIC | Age: 54
End: 2024-05-07
Payer: MEDICAID

## 2024-05-07 VITALS
WEIGHT: 190 LBS | OXYGEN SATURATION: 95 % | SYSTOLIC BLOOD PRESSURE: 118 MMHG | DIASTOLIC BLOOD PRESSURE: 78 MMHG | HEART RATE: 77 BPM | BODY MASS INDEX: 28.89 KG/M2

## 2024-05-07 DIAGNOSIS — D80.1 HYPOGAMMAGLOBULINEMIA: ICD-10-CM

## 2024-05-07 DIAGNOSIS — Z87.891 PERSONAL HISTORY OF TOBACCO USE, PRESENTING HAZARDS TO HEALTH: ICD-10-CM

## 2024-05-07 DIAGNOSIS — J43.8 OTHER EMPHYSEMA: Primary | ICD-10-CM

## 2024-05-07 PROCEDURE — 3008F BODY MASS INDEX DOCD: CPT | Mod: CPTII,S$GLB,, | Performed by: INTERNAL MEDICINE

## 2024-05-07 PROCEDURE — 1160F RVW MEDS BY RX/DR IN RCRD: CPT | Mod: CPTII,S$GLB,, | Performed by: INTERNAL MEDICINE

## 2024-05-07 PROCEDURE — 1159F MED LIST DOCD IN RCRD: CPT | Mod: CPTII,S$GLB,, | Performed by: INTERNAL MEDICINE

## 2024-05-07 PROCEDURE — 99214 OFFICE O/P EST MOD 30 MIN: CPT | Mod: S$GLB,,, | Performed by: INTERNAL MEDICINE

## 2024-05-07 PROCEDURE — 3078F DIAST BP <80 MM HG: CPT | Mod: CPTII,S$GLB,, | Performed by: INTERNAL MEDICINE

## 2024-05-07 PROCEDURE — 3074F SYST BP LT 130 MM HG: CPT | Mod: CPTII,S$GLB,, | Performed by: INTERNAL MEDICINE

## 2024-05-07 NOTE — PROGRESS NOTES
"    Subjective:       Patient ID: Araceli Ibarra is a 53 y.o. female.    Chief Complaint: Follow-up (Follow up recurring pneumonia ; Tabacoo use )      5/17/2017 - Here for follow up, overall doing much better with ANORO.  Did get some chest wall injury at a fair and has some chest discomfort as a result.  She continues to work on stopping smoking. No other new complaints    1/21/2017 - Here for follow up, still smoking (lots of smokers in the house).  She is currently using cigarettes and a VAPE.  Has dyspnea (primarily with exertion), has daily cough (mucus currently brown - her usual).  She does feel that her breathing is worse over the last month or so.  Taking medications regularly.  Working on stopping smoking.    5/22/2018 - Here for follow up has been on TRELEGY for 4-6 months, still with problems with cough, congestion which has not been much better.  Still smoking (d/w her)., still using VAPE.  Mother has had similar problems.  Has had elevated WBC and is going to see hematologist.  Denies much nasal congestion, sputum is described as a "caramel" color.  No recent CXR.    7/31/2018 - Here for follow up, feels ok for now, has chronic cough but not worse.  Has chronic dyspnea (not worse).  Still smoking about 1/2 PPD.  Reviewed CXR, CT  And sinus films with pt.  No other new issues.    10/30/2018 - Here for follow up, has been getting immunoglobulin infusions.  Smoking about 6 cig/day.  Overall doing ok, no recent pneumonia, no recent ER visits or hospitalizations.  Sputum is still described as brown but not as much as usual.  Has also been approved for DALIRESP.and started about 2 weeks ago.    2/5/2019 - Here for follow up, still with daily mucus (white to a little green).  Breathing has been OK.  Usually sinuses are not as much of a problem.  No reflux or heartburn.  Has been on daliresp for about 4 months and on TRELEGY.  Still smoking (was as high as 1 PPD but back to < 1/2 PPD).  Getting IVIG and " "hasn't had pneumonia since.  No hemoptysis.    5/7/2019 - Here for follow up, overall stable on present meds (TRELEGY and DALIRESP - she has been on numerous other regimens and this has been the best at controlling her symptoms).  Pt is currently stable on present medications with no recent increases in their symptoms or use of rescue medications.  I have reviewed the medical regimen and re-educated the pt on the role of rescue and controlling medications.  All questions answered.  Inhaler technique seems adequate.  Still getting immunoglobulin infusions (dose has been increased recently).  Still smoking anywhere from (1/4 - 3/4 PPD) - lives in house with multiple smokers.  CT scan showed COPD but no acute changes.  Sputum culture and AFB were negative.     11/7/2019 - Had URI and temp to 104, CXR was OK, treated with antibiotics and feels better.  She does feel that she "let it go too far".  Has had issues with exposures to things, AC went out, still with a lot of stress.  Still smoking about < 1/2 PPD but has second hand exposure as well.  Getting infusions from Dr Vasquez.  Does not vape at this time.  Had a fall at [employer] and had some back injury which has slowed her down.    5/7/2020 - Virtual Visit    The chief complaint leading to consultation is: follow up  The patient location is:  in car  Visit type: Virtual visit with synchronous audio and video    There was a delay in getting some of her hizentra infusion and she felt that she was having more trouble but since restarting she feels that she is better (reports that dose has been increased).  Still smoking about 7-8 cigarettes per day.  Not vaping at this time.    I have discussed the limitations of a virtual visit with the patient including the fact that there are no vital signs and no way to fully examine the patient.  This could lead to misdiagnosis and possibly to delays in appropriate care.    The physical exam in this note is pulled forward from " prior visits.  It will be updated based on any findings which I can discern based on seeing the patient on a video screen.  I will not eliminate findings from prior exams at this visit.    11/11/2020 - Here for follow up, has been getting regular hizentra (weekly) infusions and has some continued chronic cough with some brownish sputum but does feel better overall with the infusions.  Still smoking but mostly less than 1/2 PPD.  Last CXR 6/2020 was OK.  Patient has no known corona virus exposures and has been practicing social distancing.  We have discussed the virus and precautions and all questions have been answered.    5/12/2021 - Here for follow up, coughing has been about the same to better.  Still continuing with Hizentra infusions.  Still smoking but has had some periods without smoking but her home situation is stressful and there are smokers.  Patient has no known corona virus exposures and has been practicing social distancing.  We have discussed the virus and precautions and all questions have been answered.  Got Covid vaccine (Pfizer).    11/10/2021 - Here for follow up,  Has not been on TRELEGY in a while.  Since last visit was sick one time and has resolved.  She is still smoking and is up and down with it.  Still with a lot of stress at home.  She wants to try and see how she does without a controlling medication since she has not been using one.  Still has some anxiety.  Patient has no known corona virus exposures and has been practicing social distancing.  We have discussed the virus and precautions and all questions have been answered.  She has had Covid vaccine (Pfizer and more recently had 2 doses of Moderna)    5/10/2022 - Here for follow up,  Pt is currently stable on present medications with no recent increases in their symptoms or use of rescue medications.  Since our last visit there have been no hospitalizations or ER visits for their respiratory issues and there does not seem to be  anything to suggest unrecognized exacerbations.  I have reviewed the medical regimen and re-educated the pt on the role of rescue and controlling medications.  Inhaler technique and understanding seems adequate.  The patient reports no issues with any of there medications for their COPD.  Refills will be taken care of as needed.  All questions answered.  Patient has no known corona virus exposures and has been practicing social distancing.  We have discussed the virus and precautions and all questions have been answered.    11/10/2022 - Here for follow up, under a lot of stress with her mother (now in hospital). Breathing not as good because of her stress.  No other new issues.   HAs increased smoking to > 1 PPD (we discussed this).    5/11/2023 - Here for follow up, she tells me that she was exposed to TB in the past and had a + PPD and was treated for 6 months.   Pt is currently stable on present medications with no recent increases in their symptoms or use of rescue medications.  Since our last visit there have been no hospitalizations or ER visits for their respiratory issues and there does not seem to be anything to suggest unrecognized exacerbations.  I have reviewed the medical regimen and re-educated the pt on the role of rescue and controlling medications.  Inhaler technique and understanding seems adequate.  The patient reports no issues with any of there medications for their COPD.  Refills will be taken care of as needed.  All questions answered.  She is planning to get repeat bivalent Covid vaccine.  She needs refills of some of her heart meds.  She is still smoking about the same.    11/7/2023 - Here for follow up, Pt is currently stable on present medications with no recent increases in their symptoms or use of rescue medications.  Since our last visit there have been no hospitalizations or ER visits for their respiratory issues and there does not seem to be anything to suggest unrecognized  exacerbations.  I have reviewed the medical regimen and re-educated the pt on the role of rescue and controlling medications.  Inhaler technique and understanding seems adequate.  The patient reports no issues with any of there medications for their COPD.  Refills will be taken care of as needed.  All questions answered.  Continues with daily cough and congestion but is still smoking 1-1.5 PPD - we discussed this.  She continues with her infusions and feels that it helps with her recurrent infections.  She is not vaping at all now.  She has been on TRELEGY in the past and she did not notice any significant change in her symptoms.  We could not get insurance to cover DALIRESP in the past.    5/7/2024 - Here for follow up, Pt is currently stable on present medications with no recent increases in their symptoms or use of rescue medications.  Since our last visit there have been no hospitalizations or ER visits for their respiratory issues and there does not seem to be anything to suggest unrecognized exacerbations.  I have reviewed the medical regimen and re-educated the pt on the role of rescue and controlling medications.  Inhaler technique and understanding seems adequate.  The patient reports no issues with any of there medications for their COPD.  Refills will be taken care of as needed.  All questions answered.  She is smoking more because of stress in her family (son is being bullied).  We discussed this.  She is very concerned about the possibility of getting Covid and we discussed this.  She does feel down and depressed.  We discussed the need to get additional psychiatric and psychological help.        COPD    GOLD A    Last PFT - 5/21  FEV1- 94 % DLCO - 62 %     + LABA/LAMA/ICS/daliresp    + prn ALBUTEROL    mMRC -  0 - SOB with strenuous exercise   - + 1 - SOB level ground, slight hill   -  2 - SOB walk slower or stop for breath level ground   -  3 - SOB at 100 yards or after few minutes   -  4 - SOB in  "house, dressing    Referral to PULMONARY REHABILITATION -   NO    Tested for alpha-1-antitrypsin - NO    Cigarette Counseling    Currently smoking about 1 packs per day  30 pack years    I have counseled pt for 3-5 minutes regarding cigarette cessation.  This has included the need to stop smoking as well as strategies, including but not limited to "cold turkey", CHANTIX (including risks and benefits of kasia drug), nicotine replacement and WELLBUTRIN.      COPD  Associated symptoms include coughing. Pertinent negatives include no abdominal pain, arthralgias, chest pain, chills, congestion, fatigue, fever, headaches, myalgias, nausea, numbness, sore throat or weakness.   Follow-up  Associated symptoms include coughing. Pertinent negatives include no abdominal pain, arthralgias, chest pain, chills, congestion, fatigue, fever, headaches, myalgias, nausea, numbness, sore throat or weakness.     Review of Systems   Constitutional:  Negative for activity change, appetite change, chills, fatigue and fever.   HENT:  Negative for congestion, hearing loss, nosebleeds, postnasal drip, sneezing and sore throat.    Respiratory:  Positive for cough and shortness of breath. Negative for apnea, choking, chest tightness, wheezing and stridor.    Cardiovascular:  Negative for chest pain, palpitations and leg swelling.   Gastrointestinal:  Negative for abdominal distention, abdominal pain, constipation, diarrhea and nausea.   Genitourinary:  Negative for dysuria, frequency and urgency.   Musculoskeletal:  Negative for arthralgias and myalgias.   Neurological:  Negative for dizziness, tremors, seizures, syncope, facial asymmetry, speech difficulty, weakness, light-headedness, numbness and headaches.   Hematological:  Negative for adenopathy.   Psychiatric/Behavioral:  Negative for dysphoric mood. The patient is nervous/anxious.        Objective:       Vitals:    05/07/24 1112   BP: 118/78   BP Location: Left arm   Patient Position: " Sitting   BP Method: Medium (Manual)   Pulse: 77   SpO2: 95%   Weight: 86.2 kg (190 lb)         Physical Exam  Vitals and nursing note reviewed.   Constitutional:       General: She is not in acute distress.     Appearance: She is well-developed. She is not diaphoretic.   HENT:      Head: Normocephalic and atraumatic.      Nose: Nose normal.   Eyes:      Conjunctiva/sclera: Conjunctivae normal.      Pupils: Pupils are equal, round, and reactive to light.   Neck:      Thyroid: No thyromegaly.      Vascular: No JVD.      Trachea: No tracheal deviation.   Cardiovascular:      Rate and Rhythm: Normal rate and regular rhythm.      Heart sounds: Normal heart sounds. No murmur heard.     No friction rub. No gallop.   Pulmonary:      Effort: Pulmonary effort is normal. No respiratory distress.      Breath sounds: Normal breath sounds. No stridor. No wheezing or rales.   Chest:      Chest wall: No tenderness.   Abdominal:      General: Bowel sounds are normal. There is no distension.      Palpations: Abdomen is soft.      Tenderness: There is no abdominal tenderness.   Musculoskeletal:         General: No tenderness. Normal range of motion.      Cervical back: Normal range of motion and neck supple.   Lymphadenopathy:      Cervical: No cervical adenopathy.   Skin:     General: Skin is warm and dry.   Neurological:      Mental Status: She is alert and oriented to person, place, and time.      Cranial Nerves: No cranial nerve deficit.   Psychiatric:         Behavior: Behavior normal.         Assessment:       No diagnosis found.    Plan:       Problem List Items Addressed This Visit          Pulmonary    Recurrent pneumonia  - stable at this time      Chronic obstructive pulmonary disease  - continue present treatments  - try to decrease smoking  - RTC 6 month         Other    Personal history of tobacco use, presenting hazards to health  - d/w pt about the need to stop    Immunoglobulin deficiency  - per Dr Vasquez  - gets  weekly hizentra infusions    Depression/anxiety  - as above  - she needs to get additional help  - we discussed some strategies             Darrel Page MD

## 2024-05-30 ENCOUNTER — CLINICAL SUPPORT (OUTPATIENT)
Dept: SMOKING CESSATION | Facility: CLINIC | Age: 54
End: 2024-05-30
Payer: COMMERCIAL

## 2024-05-30 DIAGNOSIS — F17.210 MODERATE CIGARETTE SMOKER (10-19 PER DAY): Primary | ICD-10-CM

## 2024-05-30 PROCEDURE — 99404 PREV MED CNSL INDIV APPRX 60: CPT | Mod: S$GLB,,,

## 2024-05-30 PROCEDURE — 99999 PR PBB SHADOW E&M-EST. PATIENT-LVL II: CPT | Mod: PBBFAC,,,

## 2024-05-30 RX ORDER — IBUPROFEN 200 MG
1 TABLET ORAL DAILY
Qty: 28 PATCH | Refills: 0 | Status: CANCELLED | OUTPATIENT
Start: 2024-05-30

## 2024-05-30 RX ORDER — DM/P-EPHED/ACETAMINOPH/DOXYLAM 30-7.5/3
2 LIQUID (ML) ORAL
Qty: 168 LOZENGE | Refills: 0 | Status: CANCELLED | OUTPATIENT
Start: 2024-05-30

## 2024-05-30 NOTE — Clinical Note
Patient was seen for tobacco cessation intake assesment.  Patient will begin on 21 mg nicotine patch and 2 mg nicotine lozenge.  We discussed self awarness, triggers, tracking usage, reduction/treatment plan and follow up.

## 2024-06-19 ENCOUNTER — CLINICAL SUPPORT (OUTPATIENT)
Dept: SMOKING CESSATION | Facility: CLINIC | Age: 54
End: 2024-06-19
Payer: COMMERCIAL

## 2024-06-19 DIAGNOSIS — F17.210 MODERATE CIGARETTE SMOKER (10-19 PER DAY): Primary | ICD-10-CM

## 2024-06-19 PROCEDURE — 99404 PREV MED CNSL INDIV APPRX 60: CPT | Mod: S$GLB,,,

## 2024-06-19 PROCEDURE — 99999 PR PBB SHADOW E&M-EST. PATIENT-LVL I: CPT | Mod: PBBFAC,,,

## 2024-06-19 NOTE — PROGRESS NOTES
Individual Follow-Up Form    6/19/2024    Clinical Status of Patient: Outpatient    Length of Service: 60 minutes    Continuing Medication: yes  Patches    Other Medications: none     Target Symptoms: Withdrawal and medication side effects. The following were  rated moderate (3) to severe (4) on TCRS:  Moderate (3): none  Severe (4): none    Comments: Patient is smoking between 12-16 cpd down from 1.5 ppd.  Patient will rate rade to 12 cpd.  Patient continue to use 2 mg nicotine lozenge daily without any negative side effects Patient reports not using nicotine patch daily as she should.  Patient did use nicotine patches on 2 days since last visit without any negative side effects at this time.   Completion of TCRS (Tobacco Cessation Rating Scale) learned addiction model, cues/triggers, personal reasons/motivation for quitting, willpower, medications, goals, quit date. The patient denies any abnormal behavioral or mental changes at this time. The patient will continue with  therapy sessions and medication monitoring by CTTS. Prescribed medication management will be by physician.        Diagnosis: F17.210    Next Visit: 2 weeks

## 2024-06-24 ENCOUNTER — TELEPHONE (OUTPATIENT)
Dept: ALLERGY | Facility: CLINIC | Age: 54
End: 2024-06-24
Payer: MEDICAID

## 2024-07-02 ENCOUNTER — TELEPHONE (OUTPATIENT)
Dept: OBSTETRICS AND GYNECOLOGY | Facility: CLINIC | Age: 54
End: 2024-07-02
Payer: MEDICAID

## 2024-07-02 NOTE — TELEPHONE ENCOUNTER
----- Message from Jimmie Roper sent at 7/2/2024 12:05 PM CDT -----  Contact: Self  Type:  Sooner Appointment Request    Caller is requesting a sooner appointment.  Caller declined first available appointment listed below.  Caller will not accept being placed on the waitlist and is requesting a message be sent to doctor.    Name of Caller:  Patient  When is the first available appointment?  N/a  Symptoms:  WWE  Would the patient rather a call back or a response via [facility]? Call  Best Call Back Number:  [phone]   Additional Information:

## 2024-07-09 ENCOUNTER — CLINICAL SUPPORT (OUTPATIENT)
Dept: SMOKING CESSATION | Facility: CLINIC | Age: 54
End: 2024-07-09
Payer: COMMERCIAL

## 2024-07-09 DIAGNOSIS — F17.210 CIGARETTE SMOKER: Primary | ICD-10-CM

## 2024-07-09 PROCEDURE — 99999 PR PBB SHADOW E&M-EST. PATIENT-LVL II: CPT | Mod: PBBFAC,,,

## 2024-07-09 PROCEDURE — 99404 PREV MED CNSL INDIV APPRX 60: CPT | Mod: S$GLB,,,

## 2024-07-09 NOTE — PROGRESS NOTES
Individual Follow-Up Form    7/9/2024    Clinical Status of Patient: Outpatient    Length of Service: 60 minutes    Continuing Medication: yes  Patches    Other Medications: none     Target Symptoms: Withdrawal and medication side effects. The following were  rated moderate (3) to severe (4) on TCRS:  Moderate (3): none  Severe (4): none    Comments: Patient is smoking 18 cpd or less.  Patient admits to using nicotine patches a few time this week.  Patient feels better supported when using nicotine patch. Patient was re instructed on importance of medication use.  Completion of TCRS (Tobacco Cessation Rating Scale) reviewed strategies, cues, and triggers. Introduced the negative impact of tobacco on health, the health advantages of discontinuing the use of tobacco, time line improved health changes after a quit, withdrawal issues to expect from nicotine and habit, and ways to achieve the goal of a quit.   The patient denies any abnormal behavioral or mental changes at this time. The patient will continue with  therapy sessions and medication monitoring by CTTS. Prescribed medication management will be by physician.      Diagnosis: F17.210    Next Visit: 2 weeks

## 2024-07-09 NOTE — Clinical Note
Patient is smoking 18 cpd or less.  Patient admits to using nicotine patches a few time this week.  Patient feels better supported when using nicotine patch. Patient was re instructed on importance of medication use.  Completion of TCRS (Tobacco Cessation Rating Scale) reviewed strategies, cues, and triggers. Introduced the negative impact of tobacco on health, the health advantages of discontinuing the use of tobacco, time line improved health changes after a quit, withdrawal issues to expect from nicotine and habit, and ways to achieve the goal of a quit.   The patient denies any abnormal behavioral or mental changes at this time. The patient will continue with  therapy sessions and medication monitoring by CTTS. Prescribed medication management will be by physician.

## 2024-07-16 ENCOUNTER — TELEPHONE (OUTPATIENT)
Dept: OBSTETRICS AND GYNECOLOGY | Facility: CLINIC | Age: 54
End: 2024-07-16
Payer: MEDICAID

## 2024-07-16 ENCOUNTER — TELEPHONE (OUTPATIENT)
Dept: ALLERGY | Facility: CLINIC | Age: 54
End: 2024-07-16
Payer: MEDICAID

## 2024-07-16 NOTE — TELEPHONE ENCOUNTER
----- Message from Tara Evans LPN sent at 7/16/2024  1:03 PM CDT -----  Contact: Patient    ----- Message -----  From: Zaida Jin  Sent: 7/16/2024  12:16 PM CDT  To: Devika [facility]DE[facility] Staff    Type:  Pharmacy Calling to Clarify an RX    Name of Caller:  Patient    Pharmacy Name:      [facility] [address] [place] [id number]  Phone: [phone] Fax: [phone]    Prescription Name:  immun glob G,IgG,-pro-IgA 0-50 (HIZENTRA) 10 gram/50 mL (20 %) Soln   [[id number]]    What do they need to clarify?:  Prior Authorization    Best Call Back Number:    [phone]    Additional Information:   States she was told the pharmacy needs prior authorization - states she would like to know why the previous prior authorization only lasted 6 months when she only sees him once a year - please call pharmacy - please call patient - thank you

## 2024-07-16 NOTE — TELEPHONE ENCOUNTER
----- Message from Zaida Jin sent at 7/16/2024 12:17 PM CDT -----  Contact: Patient  Type:  Appointment Request    Caller is requesting a sooner appointment.  Caller declined first available appointment listed below.  Caller will not accept being placed on the waitlist and is requesting a message be sent to doctor.    Name of Caller:  Patient  When is the first available appointment?  N/A    Would the patient rather a call back or a response via [facility]?   Call back  Best Call Back Number: [phone]    Additional Information:   States she would like to speak with someone to schedule her annual - states last annual with Dr Alves was 8/23 - [facility] would not let me schedule this appointment for her - please call - thank you

## 2024-07-16 NOTE — TELEPHONE ENCOUNTER
Spoke to pt and advised she is due for 6 month f/u requested by Dr. Fortune at previous visit. Also advised Her insurance company decides the length of medication PA, not Dr. Fortune. Pt complained may not receive Aug medication because not authorized. I referenced the message sent concerning this which she read but did not respond to.  Scheduled f/u for 07/29/2024.

## 2024-07-24 ENCOUNTER — CLINICAL SUPPORT (OUTPATIENT)
Dept: SMOKING CESSATION | Facility: CLINIC | Age: 54
End: 2024-07-24
Payer: MEDICAID

## 2024-07-24 DIAGNOSIS — F17.210 LIGHT CIGARETTE SMOKER (1-9 CIGS/DAY): Primary | ICD-10-CM

## 2024-07-24 PROCEDURE — 99999 PR PBB SHADOW E&M-EST. PATIENT-LVL II: CPT | Mod: PBBFAC,,,

## 2024-07-24 PROCEDURE — 99212 OFFICE O/P EST SF 10 MIN: CPT | Mod: PBBFAC,PO

## 2024-07-24 RX ORDER — VARENICLINE TARTRATE 0.5 (11)-1
KIT ORAL
Qty: 1 EACH | Refills: 0 | Status: SHIPPED | OUTPATIENT
Start: 2024-07-24

## 2024-07-25 NOTE — PROGRESS NOTES
Individual Follow-Up Form    7/25/2024    Clinical Status of Patient: Outpatient    Length of Service: 60 minutes    Continuing Medication: yes  Patches    Other Medications: none     Target Symptoms: Withdrawal and medication side effects. The following were  rated moderate (3) to severe (4) on TCRS:  Moderate (3): none  Severe (4): none    Comments: Patient is smoking 18-21cpd.  Patient reports no behavior changes discussed last visit.  Patient reports ambivalence about quitting. Patient states she needs to quit but is unsure if she wants to quit. We normalized ambivalence and discussed benefits and reasons to quit.  Patient continue to use 21 mg nicotine patch and feels it's not enough support to help with decrease.  Patient will begin on Chantix starter dose.  Completion of TCRS (Tobacco Cessation Rating Scale) reviewed strategies, controlling environment, cues, triggers, new goals set. Introduced high risk situations with preparation interventions, caffeine similarities with withdrawal issues of habit and nicotine, Alcohol, Understanding urges, cravings, stress and relaxation. Open discussion with intervention discussion. The patient denies any abnormal behavioral or mental changes at this time.  The patient will continue with  therapy sessions and medication monitoring by CTTS. Prescribed medication management will be by physician.           Diagnosis: F17.210    Next Visit: 2 weeks

## 2024-07-25 NOTE — PROGRESS NOTES
ALLERGY & IMMUNOLOGY CLINIC -  NEW PATIENT     HISTORY OF PRESENT ILLNESS     Patient ID: Araceli Ibarra is a 53 y.o. female    CC:   Chief Complaint   Patient presents with    Immunotherapy    Allergies       HPI: Araceli Ibarra is a 53 y.o. female presents for evaluation of:    07/29/2024  SPAD: Currently on Hizentra 16grams weekly (744mg/kg/month). States she developed mild URI May 2024 along with multiple family members and has continued to have excess mucus. Denies antibiotics, fevers, allergies. 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 Main complaint is sweating episodes even at rest. Feels like she has sweating episodes on a daily basis throughout the day. Unsure if related to starting Hizentra or increased dosages back in 2019. Denies sinus infections, pneumonia, and ear infections. No ER visits and denies hospitalizations. Denies fevers and unexplained weight loss.   Continue Hizentra 16grams weekly (~735mg/kg/month)  Consider re-checking IgM and IgA at future visits  Consider baseline spirometry  Unclear etiology of sweating, would not discontinie IG replacement          REVIEW OF SYSTEMS     CONST: no F/C/NS, no unintentional weight changes  Balance of review of systems negative except as mentioned above     MEDICAL HISTORY     MedHx: active problems reviewed  SurgHx:   Past Surgical History:   Procedure Laterality Date    CERVICAL CONIZATION   W/ LASER      INTRAVENOUS INFUSION      LEFT HEART CATHETERIZATION Left 6/9/2020    Procedure: CATHETERIZATION, HEART, LEFT;  Surgeon: Edis Peñaloza MD;  Location: [facility] CATH/EP LAB;  Service: Cardiology;  Laterality: Left;    PANCREAS SURGERY      Partial removal r/t cyst.    SKIN GRAFT Right     Arm    SPLENECTOMY, PARTIAL      TONSILLECTOMY      TUBAL LIGATION       Allergies: see below  Medications:  "MAR reviewed       PHYSICAL EXAM     VS: Ht 5' 8" (1.727 m)   Wt 86.2 kg (190 lb 0.6 oz)   BMI 28.89 kg/m²   GENERAL: awake, alert, cooperative with exam  LUNGS: CTAB, no w/r/c, no increased WOB  HEART: Normal Rate and regular rhythm, normal S1/S2, no m/g/r  EXTREMITIES: +2 distal pulses, no c/c/e  DERM: no rashes, no skin breaks     ASSESSMENT/PLAN     Araceli Ibarra is a 53 y.o. female with       1. IgG deficiency    2. Deficiency of anti-pneumococcal polysaccharide antibody    3. Hypogammaglobulinemia      Continue Hizentra 16 grams every 7 days given marked improvement from infectious standpoint.   Encouraged continued smoking cessation  Check Ig isotypes with next lab draw     Follow up: 6 months (January 27th at 8am-Virtual)      Walt Fortune MD    I spent a total of 30 minutes on the day of the visit. This includes face to face time and non-face to face time preparing to see the patient (eg, review of tests), obtaining and/or reviewing separately obtained history, documenting clinical information in the electronic or other health record, independently interpreting results and communicating results to the patient/family/caregiver, or care coordinator.      "

## 2024-07-29 ENCOUNTER — OFFICE VISIT (OUTPATIENT)
Dept: ALLERGY | Facility: CLINIC | Age: 54
End: 2024-07-29
Payer: MEDICAID

## 2024-07-29 VITALS — WEIGHT: 190.06 LBS | BODY MASS INDEX: 28.8 KG/M2 | HEIGHT: 68 IN

## 2024-07-29 DIAGNOSIS — D80.6 DEFICIENCY OF ANTI-PNEUMOCOCCAL POLYSACCHARIDE ANTIBODY: ICD-10-CM

## 2024-07-29 DIAGNOSIS — D80.3 IGG DEFICIENCY: Primary | ICD-10-CM

## 2024-07-29 DIAGNOSIS — D80.1 HYPOGAMMAGLOBULINEMIA: ICD-10-CM

## 2024-07-29 PROCEDURE — 3008F BODY MASS INDEX DOCD: CPT | Mod: CPTII,,, | Performed by: STUDENT IN AN ORGANIZED HEALTH CARE EDUCATION/TRAINING PROGRAM

## 2024-07-29 PROCEDURE — 99212 OFFICE O/P EST SF 10 MIN: CPT | Mod: PBBFAC,PO | Performed by: STUDENT IN AN ORGANIZED HEALTH CARE EDUCATION/TRAINING PROGRAM

## 2024-07-29 PROCEDURE — 99999 PR PBB SHADOW E&M-EST. PATIENT-LVL II: CPT | Mod: PBBFAC,,, | Performed by: STUDENT IN AN ORGANIZED HEALTH CARE EDUCATION/TRAINING PROGRAM

## 2024-07-29 PROCEDURE — 99214 OFFICE O/P EST MOD 30 MIN: CPT | Mod: S$PBB,,, | Performed by: STUDENT IN AN ORGANIZED HEALTH CARE EDUCATION/TRAINING PROGRAM

## 2024-07-29 PROCEDURE — 1159F MED LIST DOCD IN RCRD: CPT | Mod: CPTII,,, | Performed by: STUDENT IN AN ORGANIZED HEALTH CARE EDUCATION/TRAINING PROGRAM

## 2024-07-29 RX ORDER — HUMAN IMMUNOGLOBULIN G 0.2 G/ML
16 LIQUID SUBCUTANEOUS WEEKLY
Qty: 320 ML | Refills: 12 | Status: ACTIVE | OUTPATIENT
Start: 2024-07-29

## 2024-07-30 ENCOUNTER — TELEPHONE (OUTPATIENT)
Dept: ALLERGY | Facility: CLINIC | Age: 54
End: 2024-07-30
Payer: MEDICAID

## 2024-07-30 NOTE — TELEPHONE ENCOUNTER
Received request for updated clinicals from [facility] on 06/22/2024.  See below message sent to pt on 06/24/2024      William Charles,     Just a reminder you are past due for a 6 month f/u requested by Dr. Fortune at your last visit.  Also [facility] is requested updated chart notes to support new authorization for Hizentra. Please schedule at your earliest convenience.     Thanks  Jovanni        Called pt on 07/16/2024. See message below.      Spoke to pt and advised she is due for 6 month f/u requested by Dr. Fortune at previous visit. Also advised Her insurance company decides the length of medication PA, not Dr. Fortune. Pt complained may not receive Aug medication because not authorized. I referenced the message sent concerning this which she read but did not respond to.  Scheduled f/u for 07/29/2024.          Clinical note from 07/29/2024 visit faxed to [facility] at [phone] on 07/29/2024.    Original request uploaded to media manager.

## 2024-08-09 LAB
IGA SERPL-MCNC: 125 MG/DL (ref 87–352)
IGG SERPL-MCNC: 1148 MG/DL (ref 586–1602)
IGM SERPL-MCNC: 64 MG/DL (ref 26–217)

## 2024-08-14 ENCOUNTER — CLINICAL SUPPORT (OUTPATIENT)
Dept: SMOKING CESSATION | Facility: CLINIC | Age: 54
End: 2024-08-14
Payer: COMMERCIAL

## 2024-08-14 DIAGNOSIS — F17.210 MODERATE SMOKER (20 OR LESS PER DAY): Primary | ICD-10-CM

## 2024-08-14 PROCEDURE — 99407 BEHAV CHNG SMOKING > 10 MIN: CPT | Mod: S$GLB,,,

## 2024-08-14 PROCEDURE — 99999 PR PBB SHADOW E&M-EST. PATIENT-LVL I: CPT | Mod: PBBFAC,,,

## 2024-08-16 RX ORDER — IBUPROFEN 200 MG
1 TABLET ORAL DAILY
Qty: 28 PATCH | Refills: 0 | Status: SHIPPED | OUTPATIENT
Start: 2024-08-16

## 2024-08-20 ENCOUNTER — TELEPHONE (OUTPATIENT)
Dept: ALLERGY | Facility: CLINIC | Age: 54
End: 2024-08-20
Payer: MEDICAID

## 2024-08-20 NOTE — TELEPHONE ENCOUNTER
Pt asking for Rx to get RSV vaccine at [facility]. Also would you recommend any other vaccines. She is aware you will return 09/03/2024, and respond then.  You can respond directly to her via portal

## 2024-08-20 NOTE — TELEPHONE ENCOUNTER
----- Message from Sanjiv Dvaila sent at 8/19/2024  4:56 PM CDT -----  Regarding: advice  Type:  Needs Medical Advice    Who Called: pt    Best Call Back Number: [phone]      Additional Information: pt st that she went to get an rsv shot in was she needs a script for it. She also wants to to discuss getting others.  please call to discuss.

## 2024-08-22 ENCOUNTER — CLINICAL SUPPORT (OUTPATIENT)
Dept: SMOKING CESSATION | Facility: CLINIC | Age: 54
End: 2024-08-22
Payer: COMMERCIAL

## 2024-08-22 DIAGNOSIS — F17.210 MODERATE CIGARETTE SMOKER (10-19 PER DAY): Primary | ICD-10-CM

## 2024-08-22 PROCEDURE — 99999 PR PBB SHADOW E&M-EST. PATIENT-LVL I: CPT | Mod: PBBFAC,,,

## 2024-08-22 PROCEDURE — 99404 PREV MED CNSL INDIV APPRX 60: CPT | Mod: S$GLB,,,

## 2024-08-26 NOTE — PROGRESS NOTES
Individual Follow-Up Form    8/22/24    Clinical Status of Patient: Outpatient    Length of Service: 60 minutes    Continuing Medication: yes  Chantix    Other Medications: none     Target Symptoms: Withdrawal and medication side effects. The following were  rated moderate (3) to severe (4) on TCRS:  Moderate (3): none  Severe (4): none    Comments:Follow up done by phone after patient could not show up in office do to transportation issues/ Patient is smoking 15-20 cpd. Patient started use of Chantix and denies any negative side effects at this time.   Completion of TCRS (Tobacco Cessation Rating Scale) reviewed strategies, habitual behavior, stress, and high risk situations. Introduced stress with addition interventions, SOLVE, relaxation with interventions, nutrition, exercise, weight gain, and the importance of rewarding oneself for accomplishments toward becoming tobacco free. Open discussion of all items with interventions. The patient denies any abnormal behavioral or mental changes at this time. The patient will continue with  therapy sessions and medication monitoring by CTTS. Prescribed medication management will be by physician.      Diagnosis: F17.210    Next Visit: 2 weeks

## 2024-08-29 ENCOUNTER — PATIENT MESSAGE (OUTPATIENT)
Dept: ALLERGY | Facility: CLINIC | Age: 54
End: 2024-08-29
Payer: MEDICAID

## 2024-09-05 ENCOUNTER — TELEPHONE (OUTPATIENT)
Dept: SMOKING CESSATION | Facility: CLINIC | Age: 54
End: 2024-09-05
Payer: MEDICAID

## 2024-09-05 ENCOUNTER — CLINICAL SUPPORT (OUTPATIENT)
Dept: SMOKING CESSATION | Facility: CLINIC | Age: 54
End: 2024-09-05
Payer: COMMERCIAL

## 2024-09-05 DIAGNOSIS — F17.210 MODERATE CIGARETTE SMOKER (10-19 PER DAY): Primary | ICD-10-CM

## 2024-09-05 DIAGNOSIS — F17.210 MODERATE SMOKER (20 OR LESS PER DAY): ICD-10-CM

## 2024-09-05 PROCEDURE — 99404 PREV MED CNSL INDIV APPRX 60: CPT | Mod: S$GLB,,,

## 2024-09-05 PROCEDURE — 99999 PR PBB SHADOW E&M-EST. PATIENT-LVL I: CPT | Mod: PBBFAC,,,

## 2024-09-05 RX ORDER — VARENICLINE TARTRATE 1 MG/1
1 TABLET, FILM COATED ORAL 2 TIMES DAILY
Qty: 60 TABLET | Refills: 0 | Status: SHIPPED | OUTPATIENT
Start: 2024-09-05

## 2024-09-05 RX ORDER — IBUPROFEN 200 MG
1 TABLET ORAL DAILY
Qty: 28 PATCH | Refills: 0 | Status: SHIPPED | OUTPATIENT
Start: 2024-09-05

## 2024-09-05 NOTE — PROGRESS NOTES
Individual Follow-Up Form    9/5/2024    Clinical Status of Patient: Outpatient    Length of Service: 60 minutes    Continuing Medication: yes  Chantix    Other Medications: nicotine patch      Target Symptoms: Withdrawal and medication side effects. The following were  rated moderate (3) to severe (4) on TCRS:  Moderate (3): none  Severe (4): none    Comments: Patient continue to smoke the same.  Patient continue to use 1 mg Chantix twice daily. Patient states she wavers from day to day as to if she really wants to quit or not.   Patient continue to smoke in her room or TV room.  Patient isn't applying delay and distract techniques. We discussed the importance of behavior changes.  We discussed motivating factors to quit.  We did willpower building.  Patient states she will attempt a rate fade this week based on what we discussed today.   The patient denies any abnormal behavioral or mental changes at this time. The patient will continue with  therapy sessions and medication monitoring by CTTS. Prescribed medication management will be by physician.      Diagnosis: F17.210    Next Visit: 2 weeks

## 2024-09-06 ENCOUNTER — TELEPHONE (OUTPATIENT)
Dept: ALLERGY | Facility: CLINIC | Age: 54
End: 2024-09-06
Payer: MEDICAID

## 2024-09-06 NOTE — TELEPHONE ENCOUNTER
New Rx for Hizentra faxed to [facility] at [phone] on 09/05/2024.  Original uploaded to .

## 2024-09-17 ENCOUNTER — HOSPITAL ENCOUNTER (EMERGENCY)
Facility: HOSPITAL | Age: 54
Discharge: HOME | End: 2024-09-17
Attending: STUDENT IN AN ORGANIZED HEALTH CARE EDUCATION/TRAINING PROGRAM
Payer: MEDICAID

## 2024-09-17 VITALS
BODY MASS INDEX: 28.79 KG/M2 | WEIGHT: 190 LBS | RESPIRATION RATE: 17 BRPM | OXYGEN SATURATION: 99 % | SYSTOLIC BLOOD PRESSURE: 128 MMHG | HEIGHT: 68 IN | TEMPERATURE: 98 F | DIASTOLIC BLOOD PRESSURE: 70 MMHG | HEART RATE: 94 BPM

## 2024-09-17 DIAGNOSIS — M25.511 RIGHT SHOULDER PAIN: Primary | ICD-10-CM

## 2024-09-17 PROCEDURE — 99283 EMERGENCY DEPT VISIT LOW MDM: CPT | Mod: 25

## 2024-09-17 RX ORDER — DM/P-EPHED/ACETAMINOPH/DOXYLAM 30-7.5/3
LIQUID (ML) ORAL
COMMUNITY
Start: 2024-05-31

## 2024-09-17 RX ORDER — CARIPRAZINE 1.5 MG/1
1.5 CAPSULE, GELATIN COATED ORAL
COMMUNITY
Start: 2024-09-16

## 2024-09-17 RX ORDER — DONEPEZIL HYDROCHLORIDE 5 MG/1
5 TABLET, FILM COATED ORAL NIGHTLY
COMMUNITY
Start: 2024-09-05

## 2024-09-17 RX ORDER — METHYLPREDNISOLONE 4 MG/1
TABLET ORAL
COMMUNITY
Start: 2024-06-20

## 2024-09-17 NOTE — ED PROVIDER NOTES
Encounter Date: 9/17/2024       History     Chief Complaint   Patient presents with    Shoulder Pain     Right side and started yesterday evening     54-year-old female with a past medical history COPD, anxiety, depression, chronic neck and back pain presents to ED for right shoulder pain.  Patient states it started yesterday.  Patient complaining of intermittent sharp 8/10 pain.  She has tried hydrocodone 10 which does help.  Last dose this morning.  Patient states movement makes it worse.  Patient denies any trauma, heavy lifting, or falls.  Patient states she does have a history of arthritis in her right knee and left shoulder which she follows up with Orthopedics for.  Patient denies fever, sweats, chills, shortness breath, chest pain, abdominal pain, nausea, vomiting, color changes, swelling, numbness, and tingling.      Review of patient's allergies indicates:   Allergen Reactions    Levaquin [levofloxacin] Other (See Comments)     Tendonitis. Leg pain.    Quinazolinones Other (See Comments)     Tendonitis. Leg pain.      Penicillins Other (See Comments)     Unknown reaction as a child per mother. States she has taken amoxicillin with out reaction.      Theophylline-guaifenesin     Cephalosporins Hives    Fluconazole Nausea And Vomiting and Other (See Comments)    Sulfa (sulfonamide antibiotics) Hives     Past Medical History:   Diagnosis Date    Abnormal Pap smear of cervix     Anxiety     Back spasm     Chest pain     Chronic bronchitis     Chronic neck and back pain     Chronic pain     COPD (chronic obstructive pulmonary disease)     Depression     Emphysema/COPD     Lump in neck     PNA (pneumonia)     Thyroid disease      Past Surgical History:   Procedure Laterality Date    CERVICAL CONIZATION   W/ LASER      INTRAVENOUS INFUSION      LEFT HEART CATHETERIZATION Left 6/9/2020    Procedure: CATHETERIZATION, HEART, LEFT;  Surgeon: Edis Peñaloza MD;  Location: [facility] CATH/EP LAB;  Service: Cardiology;   Laterality: Left;    PANCREAS SURGERY      Partial removal r/t cyst.    SKIN GRAFT Right     Arm    SPLENECTOMY, PARTIAL      TONSILLECTOMY      TUBAL LIGATION       Family History   Problem Relation Name Age of Onset    Heart disease Mother      Diabetes Mother      Heart disease Father       Social History     Tobacco Use    Smoking status: Every Day     Current packs/day: 0.50     Types: Cigarettes, Vaping with nicotine    Smokeless tobacco: Never    Tobacco comments:     Smokes 1 to 1 1/2 packs daily   Substance Use Topics    Alcohol use: Not Currently    Drug use: No     Review of Systems   Constitutional:  Negative for chills, diaphoresis and fever.   Respiratory:  Negative for shortness of breath.    Cardiovascular:  Negative for chest pain.   Gastrointestinal:  Negative for abdominal pain, nausea and vomiting.   Musculoskeletal:  Positive for arthralgias (right shoulder). Negative for joint swelling.   Skin:  Negative for color change and pallor.   Neurological:  Negative for numbness.        (-) paresthesia       Physical Exam     Initial Vitals [09/17/24 1120]   BP Pulse Resp Temp SpO2   124/72 96 18 97.7 °F (36.5 °C) 96 %      MAP       --         Physical Exam    Nursing note and vitals reviewed.  Constitutional: Vital signs are normal. She appears well-developed and well-nourished. She is not diaphoretic. She is active. She does not appear ill. No distress.   HENT:   Head: Normocephalic and atraumatic.   Right Ear: External ear normal.   Left Ear: External ear normal.   Nose: Nose normal.   Eyes: Conjunctivae and EOM are normal. Pupils are equal, round, and reactive to light. Right eye exhibits no discharge. Left eye exhibits no discharge.   Neck: Phonation normal. Neck supple.   Normal range of motion.  Cardiovascular:            Pulses:       Radial pulses are 2+ on the right side.   Pulmonary/Chest: Effort normal. No respiratory distress.   Abdominal: She exhibits no distension.   Musculoskeletal:          General: Normal range of motion.      Right shoulder: No swelling, deformity, effusion, laceration, tenderness, bony tenderness or crepitus. Normal strength.      Right upper arm: Normal.      Right elbow: Normal.      Cervical back: Normal range of motion and neck supple.      Comments: Normal but painful ROM of the right shoulder. 2+ radial pulse. Normal sensation.  No erythema, warmth, swelling, or deformities noted.      Neurological: She is alert and oriented to person, place, and time.   Skin: Skin is dry. Capillary refill takes less than 2 seconds.         ED Course   Procedures  Labs Reviewed - No data to display       Imaging Results              X-Ray Shoulder Complete 2 View Right (Final result)  Result time 09/17/24 12:41:05      Final result by Alonso Irvin Jr., MD (09/17/24 12:41:05)                   Impression:      Negative shoulder radiographs.      Electronically signed by: Alonso Irvin MD  Date:    09/17/2024  Time:    12:41               Narrative:    EXAMINATION:  XR SHOULDER COMPLETE 2 OR MORE VIEWS RIGHT    CLINICAL HISTORY:  Pain in right shoulder    TECHNIQUE:  2 or more views of the shoulder are obtained.    COMPARISON:  None.    FINDINGS:  No dislocation is seen.  A fracture of the scapula, humerus or clavicle is not seen.  The acromioclavicular and glenohumeral joints are within normal limits.                                       Medications - No data to display  Medical Decision Making  54-year-old female with a past medical history COPD, anxiety, depression, chronic neck and back pain presents to ED for right shoulder pain.    Patient's chart and medical history reviewed.    ddX:  DJD  Sprain  Gout  Septic arthritis    Patient's vitals reviewed.  Afebrile, no respiratory distress, and nontoxic-appearing in the ED. Patient had normal but painful ROM of the right shoulder. 2+ radial pulse. Normal sensation.  No erythema, warmth, swelling, or deformities noted.  Patient  denied pain medication. Shoulder x-ray was unremarkable per my personal interpretation. Official x-ray interpretation showed no dislocation is seen.  A fracture of the scapula, humerus or clavicle is not seen.  The acromioclavicular and glenohumeral joints are within normal limits.  Discussed results with patient, she verbalized understanding. Considered but unlikely gout, pseudogout, and septic arthritis due to no erythema, warmth, edema, fevers, and normal range motion.  Referral sent to orthopedics for further management if pain persist for possible need for MRI.  Instructed patient to rest, ice, and elevate.   reviewed.  Patient has multiple pain medicines for home use.  Will follow-up with orthopedics. Patient agrees with this plan. Discussed with her strict return precautions, she verbalized understanding. Patient is stable for discharge.     Amount and/or Complexity of Data Reviewed  External Data Reviewed: labs, radiology and notes.  Radiology: ordered.                                      Clinical Impression:  Final diagnoses:  [M25.511] Right shoulder pain (Primary)          ED Disposition Condition    Discharge Stable          ED Prescriptions    None       Follow-up Information       Follow up With Specialties Details Why Contact Info    Mayank Schwartz MD Internal Medicine Call   [address] [place] [address] [address]  [place] [address]  [phone]               Holdsworth, Alayna, PA-C  09/17/24 0669

## 2024-09-18 ENCOUNTER — OFFICE VISIT (OUTPATIENT)
Dept: PULMONOLOGY | Facility: CLINIC | Age: 54
End: 2024-09-18
Payer: MEDICAID

## 2024-09-18 VITALS
WEIGHT: 194 LBS | HEART RATE: 83 BPM | BODY MASS INDEX: 29.5 KG/M2 | DIASTOLIC BLOOD PRESSURE: 80 MMHG | OXYGEN SATURATION: 93 % | SYSTOLIC BLOOD PRESSURE: 130 MMHG

## 2024-09-18 DIAGNOSIS — D80.1 HYPOGAMMAGLOBULINEMIA: ICD-10-CM

## 2024-09-18 DIAGNOSIS — J18.9 RECURRENT PNEUMONIA: ICD-10-CM

## 2024-09-18 DIAGNOSIS — J43.8 OTHER EMPHYSEMA: Primary | ICD-10-CM

## 2024-09-18 DIAGNOSIS — D80.3 IGG DEFICIENCY: ICD-10-CM

## 2024-09-18 PROCEDURE — 3008F BODY MASS INDEX DOCD: CPT | Mod: CPTII,S$GLB,, | Performed by: INTERNAL MEDICINE

## 2024-09-18 PROCEDURE — 1160F RVW MEDS BY RX/DR IN RCRD: CPT | Mod: CPTII,S$GLB,, | Performed by: INTERNAL MEDICINE

## 2024-09-18 PROCEDURE — 1159F MED LIST DOCD IN RCRD: CPT | Mod: CPTII,S$GLB,, | Performed by: INTERNAL MEDICINE

## 2024-09-18 PROCEDURE — 3075F SYST BP GE 130 - 139MM HG: CPT | Mod: CPTII,S$GLB,, | Performed by: INTERNAL MEDICINE

## 2024-09-18 PROCEDURE — 99214 OFFICE O/P EST MOD 30 MIN: CPT | Mod: S$GLB,,, | Performed by: INTERNAL MEDICINE

## 2024-09-18 PROCEDURE — 3079F DIAST BP 80-89 MM HG: CPT | Mod: CPTII,S$GLB,, | Performed by: INTERNAL MEDICINE

## 2024-09-18 NOTE — PROGRESS NOTES
"    Subjective:       Patient ID: Araceli Ibarra is a 54 y.o. female.    Chief Complaint: COPD (Copd follow up )      5/17/2017 - Here for follow up, overall doing much better with ANORO.  Did get some chest wall injury at a fair and has some chest discomfort as a result.  She continues to work on stopping smoking. No other new complaints    1/21/2017 - Here for follow up, still smoking (lots of smokers in the house).  She is currently using cigarettes and a VAPE.  Has dyspnea (primarily with exertion), has daily cough (mucus currently brown - her usual).  She does feel that her breathing is worse over the last month or so.  Taking medications regularly.  Working on stopping smoking.    5/22/2018 - Here for follow up has been on TRELEGY for 4-6 months, still with problems with cough, congestion which has not been much better.  Still smoking (d/w her)., still using VAPE.  Mother has had similar problems.  Has had elevated WBC and is going to see hematologist.  Denies much nasal congestion, sputum is described as a "caramel" color.  No recent CXR.    7/31/2018 - Here for follow up, feels ok for now, has chronic cough but not worse.  Has chronic dyspnea (not worse).  Still smoking about 1/2 PPD.  Reviewed CXR, CT  And sinus films with pt.  No other new issues.    10/30/2018 - Here for follow up, has been getting immunoglobulin infusions.  Smoking about 6 cig/day.  Overall doing ok, no recent pneumonia, no recent ER visits or hospitalizations.  Sputum is still described as brown but not as much as usual.  Has also been approved for DALIRESP.and started about 2 weeks ago.    2/5/2019 - Here for follow up, still with daily mucus (white to a little green).  Breathing has been OK.  Usually sinuses are not as much of a problem.  No reflux or heartburn.  Has been on daliresp for about 4 months and on TRELEGY.  Still smoking (was as high as 1 PPD but back to < 1/2 PPD).  Getting IVIG and hasn't had pneumonia since.  No " "hemoptysis.    5/7/2019 - Here for follow up, overall stable on present meds (TRELEGY and DALIRESP - she has been on numerous other regimens and this has been the best at controlling her symptoms).  Pt is currently stable on present medications with no recent increases in their symptoms or use of rescue medications.  I have reviewed the medical regimen and re-educated the pt on the role of rescue and controlling medications.  All questions answered.  Inhaler technique seems adequate.  Still getting immunoglobulin infusions (dose has been increased recently).  Still smoking anywhere from (1/4 - 3/4 PPD) - lives in house with multiple smokers.  CT scan showed COPD but no acute changes.  Sputum culture and AFB were negative.     11/7/2019 - Had URI and temp to 104, CXR was OK, treated with antibiotics and feels better.  She does feel that she "let it go too far".  Has had issues with exposures to things, AC went out, still with a lot of stress.  Still smoking about < 1/2 PPD but has second hand exposure as well.  Getting infusions from Dr Vasquez.  Does not vape at this time.  Had a fall at [employer] and had some back injury which has slowed her down.    5/7/2020 - Virtual Visit    The chief complaint leading to consultation is: follow up  The patient location is:  in car  Visit type: Virtual visit with synchronous audio and video    There was a delay in getting some of her hizentra infusion and she felt that she was having more trouble but since restarting she feels that she is better (reports that dose has been increased).  Still smoking about 7-8 cigarettes per day.  Not vaping at this time.    I have discussed the limitations of a virtual visit with the patient including the fact that there are no vital signs and no way to fully examine the patient.  This could lead to misdiagnosis and possibly to delays in appropriate care.    The physical exam in this note is pulled forward from prior visits.  It will be updated " based on any findings which I can discern based on seeing the patient on a video screen.  I will not eliminate findings from prior exams at this visit.    11/11/2020 - Here for follow up, has been getting regular hizentra (weekly) infusions and has some continued chronic cough with some brownish sputum but does feel better overall with the infusions.  Still smoking but mostly less than 1/2 PPD.  Last CXR 6/2020 was OK.  Patient has no known corona virus exposures and has been practicing social distancing.  We have discussed the virus and precautions and all questions have been answered.    5/12/2021 - Here for follow up, coughing has been about the same to better.  Still continuing with Hizentra infusions.  Still smoking but has had some periods without smoking but her home situation is stressful and there are smokers.  Patient has no known corona virus exposures and has been practicing social distancing.  We have discussed the virus and precautions and all questions have been answered.  Got Covid vaccine (Pfizer).    11/10/2021 - Here for follow up,  Has not been on TRELEGY in a while.  Since last visit was sick one time and has resolved.  She is still smoking and is up and down with it.  Still with a lot of stress at home.  She wants to try and see how she does without a controlling medication since she has not been using one.  Still has some anxiety.  Patient has no known corona virus exposures and has been practicing social distancing.  We have discussed the virus and precautions and all questions have been answered.  She has had Covid vaccine (Pfizer and more recently had 2 doses of Moderna)    5/10/2022 - Here for follow up,  Pt is currently stable on present medications with no recent increases in their symptoms or use of rescue medications.  Since our last visit there have been no hospitalizations or ER visits for their respiratory issues and there does not seem to be anything to suggest unrecognized  exacerbations.  I have reviewed the medical regimen and re-educated the pt on the role of rescue and controlling medications.  Inhaler technique and understanding seems adequate.  The patient reports no issues with any of there medications for their COPD.  Refills will be taken care of as needed.  All questions answered.  Patient has no known corona virus exposures and has been practicing social distancing.  We have discussed the virus and precautions and all questions have been answered.    11/10/2022 - Here for follow up, under a lot of stress with her mother (now in hospital). Breathing not as good because of her stress.  No other new issues.   HAs increased smoking to > 1 PPD (we discussed this).    5/11/2023 - Here for follow up, she tells me that she was exposed to TB in the past and had a + PPD and was treated for 6 months.   Pt is currently stable on present medications with no recent increases in their symptoms or use of rescue medications.  Since our last visit there have been no hospitalizations or ER visits for their respiratory issues and there does not seem to be anything to suggest unrecognized exacerbations.  I have reviewed the medical regimen and re-educated the pt on the role of rescue and controlling medications.  Inhaler technique and understanding seems adequate.  The patient reports no issues with any of there medications for their COPD.  Refills will be taken care of as needed.  All questions answered.  She is planning to get repeat bivalent Covid vaccine.  She needs refills of some of her heart meds.  She is still smoking about the same.    11/7/2023 - Here for follow up, Pt is currently stable on present medications with no recent increases in their symptoms or use of rescue medications.  Since our last visit there have been no hospitalizations or ER visits for their respiratory issues and there does not seem to be anything to suggest unrecognized exacerbations.  I have reviewed the medical  regimen and re-educated the pt on the role of rescue and controlling medications.  Inhaler technique and understanding seems adequate.  The patient reports no issues with any of there medications for their COPD.  Refills will be taken care of as needed.  All questions answered.  Continues with daily cough and congestion but is still smoking 1-1.5 PPD - we discussed this.  She continues with her infusions and feels that it helps with her recurrent infections.  She is not vaping at all now.  She has been on TRELEGY in the past and she did not notice any significant change in her symptoms.  We could not get insurance to cover DALIRESP in the past.    5/7/2024 - Here for follow up, Pt is currently stable on present medications with no recent increases in their symptoms or use of rescue medications.  Since our last visit there have been no hospitalizations or ER visits for their respiratory issues and there does not seem to be anything to suggest unrecognized exacerbations.  I have reviewed the medical regimen and re-educated the pt on the role of rescue and controlling medications.  Inhaler technique and understanding seems adequate.  The patient reports no issues with any of there medications for their COPD.  Refills will be taken care of as needed.  All questions answered.  She is smoking more because of stress in her family (son is being bullied).  We discussed this.  She is very concerned about the possibility of getting Covid and we discussed this.  She does feel down and depressed.  We discussed the need to get additional psychiatric and psychological help.      9/18/2024 - Here for follow up visit.  Patient is currently stable on present medications with no recent increases in their symptoms or use of rescue medications.  Since our last visit there have been no hospitalizations or ER visits for their respiratory issues and there does not seem to be anything to suggest unrecognized exacerbations.  I have reviewed  "the medical regimen and re-educated the pt on the role of rescue and controlling medications.  Inhaler technique and understanding seems adequate.  The patient reports no issues with any of there medications for their COPD.  Refills will be taken care of as needed.  All questions answered.  We have discussed potential future therapies or options as appropriate.  Still with her chronic cough and congestion.  She is still smoking about 1 PPD and continues to struggle with this.  Unfortunately other people are smoking in the house.  She is on chantix and so far not seeing much benefit (we discussed this).  She feels she is doing a little better with her depression. She is going to [employer] and working out.       COPD    GOLD A    Last PFT - 5/21  FEV1- 94 % DLCO - 62 %     + LABA/LAMA/ICS/daliresp    + prn ALBUTEROL    mMRC -  0 - SOB with strenuous exercise   - + 1 - SOB level ground, slight hill   -  2 - SOB walk slower or stop for breath level ground   -  3 - SOB at 100 yards or after few minutes   -  4 - SOB in house, dressing    Referral to PULMONARY REHABILITATION -   NO    Tested for alpha-1-antitrypsin - NO    Cigarette Counseling    Currently smoking about 1 packs per day  30 pack years    I have counseled pt for 3-5 minutes regarding cigarette cessation.  This has included the need to stop smoking as well as strategies, including but not limited to "cold turkey", CHANTIX (including risks and benefits of kasia drug), nicotine replacement and WELLBUTRIN.      COPD  Associated symptoms include coughing and joint swelling. Pertinent negatives include no abdominal pain, arthralgias, chest pain, chills, congestion, fatigue, fever, headaches, myalgias, nausea, numbness, sore throat or weakness.   Follow-up  Associated symptoms include coughing and joint swelling. Pertinent negatives include no abdominal pain, arthralgias, chest pain, chills, congestion, fatigue, fever, headaches, myalgias, nausea, numbness, sore " throat or weakness.     Review of Systems   Constitutional:  Negative for activity change, appetite change, chills, fatigue and fever.   HENT:  Negative for congestion, hearing loss, nosebleeds, postnasal drip, sneezing and sore throat.    Respiratory:  Positive for cough and shortness of breath. Negative for apnea, choking, chest tightness, wheezing and stridor.    Cardiovascular:  Negative for chest pain, palpitations and leg swelling.   Gastrointestinal:  Negative for abdominal distention, abdominal pain, constipation, diarrhea and nausea.   Genitourinary:  Negative for dysuria, frequency and urgency.   Musculoskeletal:  Positive for back pain and joint swelling. Negative for arthralgias and myalgias.   Neurological:  Negative for dizziness, tremors, seizures, syncope, facial asymmetry, speech difficulty, weakness, light-headedness, numbness and headaches.   Hematological:  Negative for adenopathy.   Psychiatric/Behavioral:  Negative for dysphoric mood. The patient is nervous/anxious.        Objective:       Vitals:    09/18/24 1036   BP: 130/80   BP Location: Left arm   Patient Position: Sitting   BP Method: Medium (Manual)   Pulse: 83   SpO2: (!) 93%   Weight: 88 kg (194 lb)         Physical Exam  Vitals and nursing note reviewed.   Constitutional:       General: She is not in acute distress.     Appearance: She is well-developed. She is not diaphoretic.   HENT:      Head: Normocephalic and atraumatic.      Nose: Nose normal.   Eyes:      General: No scleral icterus.        Right eye: No discharge.         Left eye: No discharge.      Extraocular Movements: Extraocular movements intact.      Conjunctiva/sclera: Conjunctivae normal.      Pupils: Pupils are equal, round, and reactive to light.   Neck:      Thyroid: No thyromegaly.      Vascular: No JVD.      Trachea: No tracheal deviation.   Cardiovascular:      Rate and Rhythm: Normal rate and regular rhythm.      Heart sounds: Normal heart sounds. No murmur  heard.     No friction rub. No gallop.   Pulmonary:      Effort: Pulmonary effort is normal. No respiratory distress.      Breath sounds: Normal breath sounds. No stridor. No wheezing, rhonchi or rales.   Chest:      Chest wall: No tenderness.   Abdominal:      General: Bowel sounds are normal. There is no distension.      Palpations: Abdomen is soft.      Tenderness: There is no abdominal tenderness. There is no guarding.   Musculoskeletal:         General: No tenderness. Normal range of motion.      Cervical back: Normal range of motion and neck supple. No rigidity.      Right lower leg: No edema.      Left lower leg: No edema.   Lymphadenopathy:      Cervical: No cervical adenopathy.   Skin:     General: Skin is warm and dry.   Neurological:      General: No focal deficit present.      Mental Status: She is alert and oriented to person, place, and time. Mental status is at baseline.      Cranial Nerves: No cranial nerve deficit.      Motor: No weakness.   Psychiatric:         Mood and Affect: Mood normal.         Behavior: Behavior normal.         Thought Content: Thought content normal.         Judgment: Judgment normal.         Assessment:       No diagnosis found.    Plan:       Problem List Items Addressed This Visit          Pulmonary    Recurrent pneumonia  - stable at this time      Chronic obstructive pulmonary disease  Continue present medications.  Will refill medications as needed.  Instructed patient to contact us with any issues concerning their medications (cost, reactions, etc.).  Have discussed with patient about inciting conditions which may exacerbate their disease.  We did discuss possible new therapies or de-escalation of therapy (if appropriate).  Asked patient if they were interested in pursuing pulmonary rehabilitation.  All questions answered  RTC 6 months  Patient instructed that they are to call if symptoms change or new issues develop prior to their next visit.           Other     "Personal history of tobacco use, presenting hazards to health  Currently smoking 1 packs per day  40 pack years  I have counseled pt for 3-5 minutes regarding cigarette cessation.  This has included the need to stop smoking as well as strategies, including but not limited to "cold turkey", CHANTIX (including risks and benefits of the drug), nicotine replacement and WELLBUTRIN.  She is currently on CHANTIX      Immunoglobulin deficiency  - per Dr Vasquez  - gets weekly hizentra infusions    Depression/anxiety  - as above  - seems better  - will follow              Darrel Page MD              "

## 2024-09-20 ENCOUNTER — OFFICE VISIT (OUTPATIENT)
Dept: ORTHOPEDICS | Facility: CLINIC | Age: 54
End: 2024-09-20
Payer: MEDICAID

## 2024-09-20 VITALS — WEIGHT: 194 LBS | BODY MASS INDEX: 29.4 KG/M2 | HEIGHT: 68 IN

## 2024-09-20 DIAGNOSIS — M25.511 RIGHT SHOULDER PAIN: ICD-10-CM

## 2024-09-20 DIAGNOSIS — M67.911 ROTATOR CUFF DISORDER, RIGHT: Primary | ICD-10-CM

## 2024-09-20 PROCEDURE — 99215 OFFICE O/P EST HI 40 MIN: CPT | Mod: PBBFAC,PO | Performed by: ORTHOPAEDIC SURGERY

## 2024-09-20 PROCEDURE — 99999 PR PBB SHADOW E&M-EST. PATIENT-LVL V: CPT | Mod: PBBFAC,,, | Performed by: ORTHOPAEDIC SURGERY

## 2024-09-20 NOTE — PROGRESS NOTES
Patient ID: Araceli Ibarra is a 54 y.o. female    Chief Complaint:   Chief Complaint   Patient presents with    Right Shoulder - Initial Visit, Pain     ED for right shoulder pain 09/17/2024.  Started 09/16/2024.  Patient complaining of intermittent sharp pain.  She has tried hydrocodone 10 which does help.  Still taking prescribed by another provider. Patient states movement makes it worse.  Patient denies any trauma, heavy lifting, or falls.  Patient states she does have a history of arthritis in her left shoulder which she follows up with Orthopedics for.       History of Present Illness:    Pleasant 54-year-old female with autoimmune disorder and immunoglobulin deficiency who is here for evaluation of right shoulder pain.  Her pain started on Monday and the pain worsened.  She went to the emergency department.  She was placed on anti-inflammatories as well as pain medication.  This did not help much.  Pain is slightly better today.  Any movement makes it worse specifically nighttime and abduction.  No recent injections.  Has had left shoulder issues in the past.    PAST MEDICAL HISTORY:   Past Medical History:   Diagnosis Date    Abnormal Pap smear of cervix     Anxiety     Back spasm     Chest pain     Chronic bronchitis     Chronic neck and back pain     Chronic pain     COPD (chronic obstructive pulmonary disease)     Depression     Emphysema/COPD     Lump in neck     PNA (pneumonia)     Thyroid disease      PAST SURGICAL HISTORY:   Past Surgical History:   Procedure Laterality Date    CERVICAL CONIZATION   W/ LASER      INTRAVENOUS INFUSION      LEFT HEART CATHETERIZATION Left 6/9/2020    Procedure: CATHETERIZATION, HEART, LEFT;  Surgeon: Edis Peñaloza MD;  Location: [facility] CATH/EP LAB;  Service: Cardiology;  Laterality: Left;    PANCREAS SURGERY      Partial removal r/t cyst.    SKIN GRAFT Right     Arm    SPLENECTOMY, PARTIAL      TONSILLECTOMY      TUBAL LIGATION       FAMILY HISTORY:   Family History    Problem Relation Name Age of Onset    Heart disease Mother      Diabetes Mother      Heart disease Father       SOCIAL HISTORY:   Social History     Occupational History    Not on file   Tobacco Use    Smoking status: Every Day     Current packs/day: 0.50     Types: Cigarettes, Vaping with nicotine    Smokeless tobacco: Never    Tobacco comments:     Smokes 1 to 1 1/2 packs daily   Substance and Sexual Activity    Alcohol use: Not Currently    Drug use: No    Sexual activity: Not Currently        MEDICATIONS:   Current Outpatient Medications:     albuterol (PROVENTIL/VENTOLIN HFA) 90 mcg/actuation inhaler, Inhale 2 puffs into the lungs every 6 (six) hours as needed. Rescue, Disp: 18 g, Rfl: 5    amitriptyline (ELAVIL) 100 MG tablet, amitriptyline 100 mg tablet  TAKE 1 TABLET BY MOUTH AT BEDTIME, Disp: , Rfl:     amLODIPine (NORVASC) 5 MG tablet, Take 5 mg by mouth., Disp: , Rfl:     aspirin (ECOTRIN) 81 MG EC tablet, 1 tablet, Disp: , Rfl:     ATIVAN 1 mg tablet, , Disp: , Rfl:     baclofen (LIORESAL) 20 MG tablet, Take 20 mg by mouth 3 (three) times daily. , Disp: , Rfl:     budesonide-formoterol 160-4.5 mcg (SYMBICORT) 160-4.5 mcg/actuation HFAA, Inhale 2 puffs into the lungs every 12 (twelve) hours. Controller, Disp: 10.2 g, Rfl: 5    busPIRone (BUSPAR) 10 MG tablet, Take 10 mg by mouth 3 (three) times daily., Disp: , Rfl:     diphenhydrAMINE (BENADRYL ALLERGY) 25 mg tablet, Take 1 tablet (25 mg total) by mouth nightly as needed for Itching. (Patient taking differently: Take 25 mg by mouth nightly as needed (Taken with Hizentra.).), Disp: 30 tablet, Rfl: 6    donepeziL (ARICEPT) 5 MG tablet, Take 5 mg by mouth every evening., Disp: , Rfl:     EScitalopram oxalate (LEXAPRO) 20 MG tablet, Take 20 mg by mouth every morning., Disp: , Rfl:     gabapentin (NEURONTIN) 800 MG tablet, Take 800 mg by mouth 2 (two) times daily., Disp: , Rfl: 1    HYDROcodone-acetaminophen (NORCO)  mg per tablet, Take 1 tablet by  mouth 3 (three) times daily. , Disp: , Rfl:     immun glob G,IgG,-pro-IgA 0-50 (HIZENTRA) 10 gram/50 mL (20 %) Soln, Inject 80 mLs (16 g total) into the skin once a week., Disp: 320 mL, Rfl: 12    isosorbide mononitrate (IMDUR) 30 MG 24 hr tablet, TAKE ONE TABLET BY MOUTH ONCE DAILY, Disp: 30 tablet, Rfl: 0    levothyroxine (SYNTHROID) 100 MCG tablet, Take 100 mcg by mouth., Disp: , Rfl:     levothyroxine 100 mcg Cap, Take 100 mcg by mouth once daily. , Disp: , Rfl:     lidocaine (LIDODERM) 5 %, Place 2 patches onto the skin once daily. , Disp: , Rfl:     lidocaine-prilocaine (EMLA) cream, Apply topically as needed. (Patient taking differently: Apply 1 each topically as needed (For infusions.).), Disp: 30 g, Rfl: 3    LINZESS 72 mcg Cap capsule, Take 72 mcg by mouth., Disp: , Rfl:     methylPREDNISolone (MEDROL DOSEPACK) 4 mg tablet, Take by mouth., Disp: , Rfl:     metoprolol succinate (TOPROL-XL) 50 MG 24 hr tablet, Take 50 mg by mouth 3 (three) times daily., Disp: , Rfl:     metoprolol tartrate (LOPRESSOR) 50 MG tablet, Take 50 mg by mouth 3 (three) times daily., Disp: , Rfl:     morphine (MS CONTIN) 15 MG 12 hr tablet, Take 15 mg by mouth 2 (two) times daily. , Disp: , Rfl:     multivit-min-iron-FA-lutein 8 mg iron-400 mcg-300 mcg Tab, , Disp: , Rfl:     nicotine (NICODERM CQ) 21 mg/24 hr, Place 1 patch onto the skin once daily., Disp: 28 patch, Rfl: 0    nicotine polacrilex 2 MG Lozg, Take by mouth., Disp: , Rfl:     nitroGLYCERIN (NITROSTAT) 0.4 MG SL tablet, , Disp: , Rfl:     QUEtiapine (SEROQUEL) 200 MG Tab, Take 200 mg by mouth every evening. , Disp: , Rfl: 0    rosuvastatin (CRESTOR) 20 MG tablet, Take 20 mg by mouth every evening. , Disp: , Rfl:     sumatriptan (IMITREX) 25 MG Tab, Take 25 mg by mouth daily as needed. , Disp: , Rfl:     temazepam (RESTORIL) 15 mg Cap, Take 30 mg by mouth every evening., Disp: , Rfl:     triamcinolone acetonide 0.1% (KENALOG) 0.1 % cream, Apply topically 2 (two) times  daily., Disp: 80 g, Rfl: 1    varenicline (CHANTIX) 1 mg Tab, Take 1 tablet (1 mg total) by mouth 2 (two) times daily., Disp: 60 tablet, Rfl: 0    VRAYLAR 1.5 mg Cap, Take 1.5 mg by mouth., Disp: , Rfl:   ALLERGIES:   Review of patient's allergies indicates:   Allergen Reactions    Levaquin [levofloxacin] Other (See Comments)     Tendonitis. Leg pain.    Quinazolinones Other (See Comments)     Tendonitis. Leg pain.      Penicillins Other (See Comments)     Unknown reaction as a child per mother. States she has taken amoxicillin with out reaction.      Theophylline-guaifenesin     Cephalosporins Hives    Fluconazole Nausea And Vomiting and Other (See Comments)    Sulfa (sulfonamide antibiotics) Hives         Physical Exam     There were no vitals filed for this visit.  Alert and oriented to person, place and time. No acute distress. Well-groomed, not ill appearing. Pupils round and reactive, normal respiratory effort, no audible wheezing.     On exam she has diffuse tenderness to palpation of the right shoulder mostly along the greater tuberosity and rotator cuff.  She has pain with provocative testing of the rotator cuff in forward flexion and abduction with mild weakness.  Positive impingement.  Positive Speed's and Yergason's test.  No significant pain over the acromioclavicular joint.  External rotation at the side 60° with negative drop-arm test.        Imaging:       X-Ray: I have reviewed all pertinent results/findings and my personal findings are:  Negative right shoulder radiographs.  No evidence of fracture or dislocation.      Assessment & Plan    Rotator cuff disorder, right  -     Cancel: MRI Shoulder Without Contrast Right; Future; Expected date: 09/20/2024  -     MRI Shoulder Without Contrast Right; Future; Expected date: 09/20/2024    Right shoulder pain  -     Ambulatory referral/consult to Orthopedics  -     Cancel: MRI Shoulder Without Contrast Right; Future; Expected date: 09/20/2024  -     MRI  Shoulder Without Contrast Right; Future; Expected date: 09/20/2024         Treatment options were discussed with the patient in detail. We discussed the patient's current imaging as well as differential diagnosis in detail. Based on the patient's symptoms of concern for rotator cuff tear, I do believe an MRI of the Right Shoulder is indicated to rule out rotator cuff tear, adhesive capsulitis, biceps/labral complex injury     The patient will follow-up after MRI to discuss results and further treatment options. They will call the clinic with any questions or concerns.     Follow up: for MRI/CT Results   X-rays next visit:  None

## 2024-09-23 ENCOUNTER — TELEPHONE (OUTPATIENT)
Dept: ORTHOPEDICS | Facility: CLINIC | Age: 54
End: 2024-09-23
Payer: MEDICAID

## 2024-09-23 NOTE — TELEPHONE ENCOUNTER
----- Message from Gali Ragland MA sent at 9/23/2024 11:50 AM CDT -----  Contact: pt  Needs to resend MRI order to [facility]   Fax    Call back   pt

## 2024-09-27 ENCOUNTER — OFFICE VISIT (OUTPATIENT)
Dept: ORTHOPEDICS | Facility: CLINIC | Age: 54
End: 2024-09-27
Payer: MEDICAID

## 2024-09-27 DIAGNOSIS — M25.511 CHRONIC RIGHT SHOULDER PAIN: Primary | ICD-10-CM

## 2024-09-27 DIAGNOSIS — G89.29 CHRONIC RIGHT SHOULDER PAIN: Primary | ICD-10-CM

## 2024-09-27 NOTE — PROGRESS NOTES
Patient ID: Araceli Ibarra is a 54 y.o. female    Chief Complaint:   No chief complaint on file.      History of Present Illness:    Pleasant 54-year-old female with autoimmune disorder and immunoglobulin deficiency who is here for evaluation of right shoulder pain.  Her pain started on Monday and the pain worsened.  She went to the emergency department.  She was placed on anti-inflammatories as well as pain medication.  This did not help much.  Pain is slightly better today.  Any movement makes it worse specifically nighttime and abduction.  No recent injections.  Has had left shoulder issues in the past.    ____________________________________________________________________    Interval history 09/27/2024 : Patient returns today for follow up of her right shoulder.  At her last visit we did order an MRI.  She wanted to get this done at an outside facility.  However lately over the past few days her pain has markedly improved.  She is inquiring about whether or not she should still get the MRI.    PAST MEDICAL HISTORY:   Past Medical History:   Diagnosis Date    Abnormal Pap smear of cervix     Anxiety     Back spasm     Chest pain     Chronic bronchitis     Chronic neck and back pain     Chronic pain     COPD (chronic obstructive pulmonary disease)     Depression     Emphysema/COPD     Lump in neck     PNA (pneumonia)     Thyroid disease      PAST SURGICAL HISTORY:   Past Surgical History:   Procedure Laterality Date    CERVICAL CONIZATION   W/ LASER      INTRAVENOUS INFUSION      LEFT HEART CATHETERIZATION Left 6/9/2020    Procedure: CATHETERIZATION, HEART, LEFT;  Surgeon: Edis Peñaloza MD;  Location: [facility] CATH/EP LAB;  Service: Cardiology;  Laterality: Left;    PANCREAS SURGERY      Partial removal r/t cyst.    SKIN GRAFT Right     Arm    SPLENECTOMY, PARTIAL      TONSILLECTOMY      TUBAL LIGATION       FAMILY HISTORY:   Family History   Problem Relation Name Age of Onset    Heart disease Mother       Diabetes Mother      Heart disease Father       SOCIAL HISTORY:   Social History     Occupational History    Not on file   Tobacco Use    Smoking status: Every Day     Current packs/day: 0.50     Types: Cigarettes, Vaping with nicotine    Smokeless tobacco: Never    Tobacco comments:     Smokes 1 to 1 1/2 packs daily   Substance and Sexual Activity    Alcohol use: Not Currently    Drug use: No    Sexual activity: Not Currently        MEDICATIONS:   Current Outpatient Medications:     albuterol (PROVENTIL/VENTOLIN HFA) 90 mcg/actuation inhaler, Inhale 2 puffs into the lungs every 6 (six) hours as needed. Rescue, Disp: 18 g, Rfl: 5    amitriptyline (ELAVIL) 100 MG tablet, amitriptyline 100 mg tablet  TAKE 1 TABLET BY MOUTH AT BEDTIME, Disp: , Rfl:     amLODIPine (NORVASC) 5 MG tablet, Take 5 mg by mouth., Disp: , Rfl:     aspirin (ECOTRIN) 81 MG EC tablet, 1 tablet, Disp: , Rfl:     ATIVAN 1 mg tablet, , Disp: , Rfl:     baclofen (LIORESAL) 20 MG tablet, Take 20 mg by mouth 3 (three) times daily. , Disp: , Rfl:     budesonide-formoterol 160-4.5 mcg (SYMBICORT) 160-4.5 mcg/actuation HFAA, Inhale 2 puffs into the lungs every 12 (twelve) hours. Controller, Disp: 10.2 g, Rfl: 5    busPIRone (BUSPAR) 10 MG tablet, Take 10 mg by mouth 3 (three) times daily., Disp: , Rfl:     diphenhydrAMINE (BENADRYL ALLERGY) 25 mg tablet, Take 1 tablet (25 mg total) by mouth nightly as needed for Itching. (Patient taking differently: Take 25 mg by mouth nightly as needed (Taken with Hizentra.).), Disp: 30 tablet, Rfl: 6    donepeziL (ARICEPT) 5 MG tablet, Take 5 mg by mouth every evening., Disp: , Rfl:     EScitalopram oxalate (LEXAPRO) 20 MG tablet, Take 20 mg by mouth every morning., Disp: , Rfl:     gabapentin (NEURONTIN) 800 MG tablet, Take 800 mg by mouth 2 (two) times daily., Disp: , Rfl: 1    HYDROcodone-acetaminophen (NORCO)  mg per tablet, Take 1 tablet by mouth 3 (three) times daily. , Disp: , Rfl:     immun glob  G,IgG,-pro-IgA 0-50 (HIZENTRA) 10 gram/50 mL (20 %) Soln, Inject 80 mLs (16 g total) into the skin once a week., Disp: 320 mL, Rfl: 12    isosorbide mononitrate (IMDUR) 30 MG 24 hr tablet, TAKE ONE TABLET BY MOUTH ONCE DAILY, Disp: 30 tablet, Rfl: 0    levothyroxine (SYNTHROID) 100 MCG tablet, Take 100 mcg by mouth., Disp: , Rfl:     levothyroxine 100 mcg Cap, Take 100 mcg by mouth once daily. , Disp: , Rfl:     lidocaine (LIDODERM) 5 %, Place 2 patches onto the skin once daily. , Disp: , Rfl:     lidocaine-prilocaine (EMLA) cream, Apply topically as needed. (Patient taking differently: Apply 1 each topically as needed (For infusions.).), Disp: 30 g, Rfl: 3    LINZESS 72 mcg Cap capsule, Take 72 mcg by mouth., Disp: , Rfl:     methylPREDNISolone (MEDROL DOSEPACK) 4 mg tablet, Take by mouth., Disp: , Rfl:     metoprolol succinate (TOPROL-XL) 50 MG 24 hr tablet, Take 50 mg by mouth 3 (three) times daily., Disp: , Rfl:     metoprolol tartrate (LOPRESSOR) 50 MG tablet, Take 50 mg by mouth 3 (three) times daily., Disp: , Rfl:     morphine (MS CONTIN) 15 MG 12 hr tablet, Take 15 mg by mouth 2 (two) times daily. , Disp: , Rfl:     multivit-min-iron-FA-lutein 8 mg iron-400 mcg-300 mcg Tab, , Disp: , Rfl:     nicotine (NICODERM CQ) 21 mg/24 hr, Place 1 patch onto the skin once daily., Disp: 28 patch, Rfl: 0    nicotine polacrilex 2 MG Lozg, Take by mouth., Disp: , Rfl:     nitroGLYCERIN (NITROSTAT) 0.4 MG SL tablet, , Disp: , Rfl:     QUEtiapine (SEROQUEL) 200 MG Tab, Take 200 mg by mouth every evening. , Disp: , Rfl: 0    rosuvastatin (CRESTOR) 20 MG tablet, Take 20 mg by mouth every evening. , Disp: , Rfl:     sumatriptan (IMITREX) 25 MG Tab, Take 25 mg by mouth daily as needed. , Disp: , Rfl:     temazepam (RESTORIL) 15 mg Cap, Take 30 mg by mouth every evening., Disp: , Rfl:     triamcinolone acetonide 0.1% (KENALOG) 0.1 % cream, Apply topically 2 (two) times daily., Disp: 80 g, Rfl: 1    varenicline (CHANTIX) 1 mg Tab,  Take 1 tablet (1 mg total) by mouth 2 (two) times daily., Disp: 60 tablet, Rfl: 0    VRAYLAR 1.5 mg Cap, Take 1.5 mg by mouth., Disp: , Rfl:   ALLERGIES:   Review of patient's allergies indicates:   Allergen Reactions    Levaquin [levofloxacin] Other (See Comments)     Tendonitis. Leg pain.    Quinazolinones Other (See Comments)     Tendonitis. Leg pain.      Penicillins Other (See Comments)     Unknown reaction as a child per mother. States she has taken amoxicillin with out reaction.      Theophylline-guaifenesin     Cephalosporins Hives    Fluconazole Nausea And Vomiting and Other (See Comments)    Sulfa (sulfonamide antibiotics) Hives         Physical Exam     There were no vitals filed for this visit.  Alert and oriented to person, place and time. No acute distress. Well-groomed, not ill appearing. Pupils round and reactive, normal respiratory effort, no audible wheezing.     On exam she has improved tenderness to palpation of the right shoulder mostly along the greater tuberosity and rotator cuff.  She has pain with provocative testing of the rotator cuff in forward flexion and abduction with mild weakness.  Positive impingement.  Positive Speed's and Yergason's test.  No significant pain over the acromioclavicular joint.  External rotation at the side 60° with negative drop-arm test.        Imaging:       X-Ray: I have reviewed all pertinent results/findings and my personal findings are:  Negative right shoulder radiographs.  No evidence of fracture or dislocation.      Assessment & Plan    Chronic right shoulder pain           Treatment options were discussed with the patient in detail.  Previously we had ordered MRI of the right shoulder to evaluate the rotator cuff secondary to autoimmune disease and chronic pain.  However lately her pain has been much better.  She is considering holding off on getting the MRI which is certainly reasonable.  Recommend p.r.n. injections and home exercise program.  Follow up  as needed

## 2024-10-11 ENCOUNTER — CLINICAL SUPPORT (OUTPATIENT)
Dept: SMOKING CESSATION | Facility: CLINIC | Age: 54
End: 2024-10-11
Payer: COMMERCIAL

## 2024-10-11 DIAGNOSIS — F17.210 HEAVY CIGARETTE SMOKER (20-39 PER DAY): Primary | ICD-10-CM

## 2024-10-11 DIAGNOSIS — F17.210 MODERATE CIGARETTE SMOKER (10-19 PER DAY): ICD-10-CM

## 2024-10-11 PROCEDURE — 99999 PR PBB SHADOW E&M-EST. PATIENT-LVL I: CPT | Mod: PBBFAC,,,

## 2024-10-11 PROCEDURE — 99402 PREV MED CNSL INDIV APPRX 30: CPT | Mod: S$GLB,,,

## 2024-10-11 RX ORDER — VARENICLINE TARTRATE 1 MG/1
1 TABLET, FILM COATED ORAL 2 TIMES DAILY
Qty: 60 TABLET | Refills: 0 | Status: SHIPPED | OUTPATIENT
Start: 2024-10-11

## 2024-10-11 NOTE — TELEPHONE ENCOUNTER
Refill Routing Note   Medication(s) are not appropriate for processing by [facility] for the following reason(s):      Medication outside of protocol    [facility] action(s):  Route Care Due:  None identified            Appointments  past 12m or future 3m with PCP    Date Provider   Last Visit   Visit date not found Juliocesar James MD   Next Visit   Visit date not found Juliocesar James MD   ED visits in past 90 days: 1        Note composed:11:55 AM 10/11/2024

## 2024-10-13 RX ORDER — VARENICLINE TARTRATE 1 MG/1
1 TABLET, FILM COATED ORAL 2 TIMES DAILY
Qty: 60 TABLET | Refills: 0 | Status: SHIPPED | OUTPATIENT
Start: 2024-10-13

## 2024-10-14 NOTE — PROGRESS NOTES
Individual Follow-Up Form    10/14/2024      Clinical Status of Patient: Outpatient    Length of Service: 30 minutes    Continuing Medication: yes  Chantix    Other Medications: nicotine patch     Target Symptoms: Withdrawal and medication side effects. The following were  rated moderate (3) to severe (4) on TCRS:  Moderate (3): none  Severe (4): none    Comments: Follow up completed by phone.  Patient is smoking 15 cpd or less Patient will continue to decrease to less than 10 cpd.    Patient continue to use 1 mg Chantix BID without any negative side effects at this time. We reviewed tobacco cessation strategies and reviewed motivations to quit.  We discussed the importance of setting a quit date.  Explained to patient proper time-frame and  use of Chantix.  We discussed and reviewed: triggers, managing stress & anxiety, support system, continued momentum, setting attainable goals, mindset & motivation, treatment plan and accountability.  The patient denies any abnormal behavioral or mental changes at this time. The patient will continue with  therapy sessions and medication monitoring by CTTS. Prescribed medication management will be by physician.'    Diagnosis: F17.210    Next Visit: 2 weeks

## 2024-10-23 ENCOUNTER — OFFICE VISIT (OUTPATIENT)
Dept: OBSTETRICS AND GYNECOLOGY | Facility: CLINIC | Age: 54
End: 2024-10-23
Payer: MEDICAID

## 2024-10-23 VITALS
SYSTOLIC BLOOD PRESSURE: 120 MMHG | BODY MASS INDEX: 29.17 KG/M2 | WEIGHT: 192.44 LBS | DIASTOLIC BLOOD PRESSURE: 86 MMHG | HEIGHT: 68 IN

## 2024-10-23 DIAGNOSIS — Z01.419 WELL WOMAN EXAM WITH ROUTINE GYNECOLOGICAL EXAM: Primary | ICD-10-CM

## 2024-10-23 DIAGNOSIS — E07.9 THYROID DISEASE: ICD-10-CM

## 2024-10-23 DIAGNOSIS — Z12.4 SCREENING FOR MALIGNANT NEOPLASM OF CERVIX: ICD-10-CM

## 2024-10-23 DIAGNOSIS — N95.1 PERIMENOPAUSAL: ICD-10-CM

## 2024-10-23 DIAGNOSIS — Z12.31 ENCOUNTER FOR SCREENING MAMMOGRAM FOR MALIGNANT NEOPLASM OF BREAST: ICD-10-CM

## 2024-10-23 PROCEDURE — 3079F DIAST BP 80-89 MM HG: CPT | Mod: CPTII,,, | Performed by: OBSTETRICS & GYNECOLOGY

## 2024-10-23 PROCEDURE — 3008F BODY MASS INDEX DOCD: CPT | Mod: CPTII,,, | Performed by: OBSTETRICS & GYNECOLOGY

## 2024-10-23 PROCEDURE — 99396 PREV VISIT EST AGE 40-64: CPT | Mod: S$PBB,,, | Performed by: OBSTETRICS & GYNECOLOGY

## 2024-10-23 PROCEDURE — 99213 OFFICE O/P EST LOW 20 MIN: CPT | Mod: PBBFAC,PO | Performed by: OBSTETRICS & GYNECOLOGY

## 2024-10-23 PROCEDURE — 99999 PR PBB SHADOW E&M-EST. PATIENT-LVL III: CPT | Mod: PBBFAC,,, | Performed by: OBSTETRICS & GYNECOLOGY

## 2024-10-23 PROCEDURE — 1159F MED LIST DOCD IN RCRD: CPT | Mod: CPTII,,, | Performed by: OBSTETRICS & GYNECOLOGY

## 2024-10-23 PROCEDURE — 3074F SYST BP LT 130 MM HG: CPT | Mod: CPTII,,, | Performed by: OBSTETRICS & GYNECOLOGY

## 2024-10-23 NOTE — PROGRESS NOTES
[facility] Note    SUBJECTIVE     Chief Complaint   Patient presents with    Annual Exam       History and Physical:  Patient's last menstrual period was 2024 (exact date).    Contraception: None   HRT: None     Date: 10/23/2024    Araceli Ibarra is a 54 y.o.  who presents today for her routine annual GYN exam.     The patient has no Gynecology complaints today.   She has longstanding issues with sweating but no menopausal issues.  Her last menstrual period was on 2024.    Pap smear history:  Positive Hx Abnormal pap smear.  Last Pap smear: 2023    Colon cancer screening:  She is planning to see Gastroenterology in the near future.  Her last colon cancer evaluation was approximately five years ago.  Personal or family history of bleeding or blood clotting disorders:  Negative    Family history:  Breast cancer: POSITIVE - maternal aunt  Colon cancer:  Negative  Gyn related cancer:  Negative    The patient reports no changes in her medical or surgical history since her last evaluation.    Allergies:   Review of patient's allergies indicates:   Allergen Reactions    Levaquin [levofloxacin] Other (See Comments)     Tendonitis. Leg pain.    Quinazolinones Other (See Comments)     Tendonitis. Leg pain.      Penicillins Other (See Comments)     Unknown reaction as a child per mother. States she has taken amoxicillin with out reaction.      Theophylline-guaifenesin     Cephalosporins Hives    Fluconazole Nausea And Vomiting and Other (See Comments)    Sulfa (sulfonamide antibiotics) Hives       Past Medical History:   Diagnosis Date    Abnormal Pap smear of cervix     Anxiety     Back spasm     Chest pain     Chronic bronchitis     Chronic neck and back pain     Chronic pain     COPD (chronic obstructive pulmonary disease)     Depression     Emphysema/COPD     Lump in neck     PNA (pneumonia)     Thyroid disease        Past Surgical History:   Procedure Laterality Date     CERVICAL CONIZATION   W/ LASER      INTRAVENOUS INFUSION      LEFT HEART CATHETERIZATION Left 6/9/2020    Procedure: CATHETERIZATION, HEART, LEFT;  Surgeon: Edis Peñaloza MD;  Location: [facility] CATH/EP LAB;  Service: Cardiology;  Laterality: Left;    PANCREAS SURGERY      Partial removal r/t cyst.    SKIN GRAFT Right     Arm    SPLENECTOMY, PARTIAL      TONSILLECTOMY      TUBAL LIGATION         MEDS:   Current Outpatient Medications:     albuterol (PROVENTIL/VENTOLIN HFA) 90 mcg/actuation inhaler, Inhale 2 puffs into the lungs every 6 (six) hours as needed. Rescue, Disp: 18 g, Rfl: 5    amitriptyline (ELAVIL) 100 MG tablet, amitriptyline 100 mg tablet  TAKE 1 TABLET BY MOUTH AT BEDTIME, Disp: , Rfl:     amLODIPine (NORVASC) 5 MG tablet, Take 5 mg by mouth., Disp: , Rfl:     aspirin (ECOTRIN) 81 MG EC tablet, 1 tablet, Disp: , Rfl:     ATIVAN 1 mg tablet, , Disp: , Rfl:     baclofen (LIORESAL) 20 MG tablet, Take 20 mg by mouth 3 (three) times daily. , Disp: , Rfl:     busPIRone (BUSPAR) 10 MG tablet, Take 10 mg by mouth 3 (three) times daily., Disp: , Rfl:     diphenhydrAMINE (BENADRYL ALLERGY) 25 mg tablet, Take 1 tablet (25 mg total) by mouth nightly as needed for Itching., Disp: 30 tablet, Rfl: 6    donepeziL (ARICEPT) 5 MG tablet, Take 5 mg by mouth every evening., Disp: , Rfl:     EScitalopram oxalate (LEXAPRO) 20 MG tablet, Take 20 mg by mouth every morning., Disp: , Rfl:     HYDROcodone-acetaminophen (NORCO)  mg per tablet, Take 1 tablet by mouth 3 (three) times daily. , Disp: , Rfl:     immun glob G,IgG,-pro-IgA 0-50 (HIZENTRA) 10 gram/50 mL (20 %) Soln, Inject 80 mLs (16 g total) into the skin once a week., Disp: 320 mL, Rfl: 12    isosorbide mononitrate (IMDUR) 30 MG 24 hr tablet, TAKE ONE TABLET BY MOUTH ONCE DAILY, Disp: 30 tablet, Rfl: 0    levothyroxine (SYNTHROID) 100 MCG tablet, Take 100 mcg by mouth., Disp: , Rfl:     lidocaine (LIDODERM) 5 %, Place 2 patches onto the skin once daily. , Disp: ,  Rfl:     lidocaine-prilocaine (EMLA) cream, Apply topically as needed., Disp: 30 g, Rfl: 3    LINZESS 72 mcg Cap capsule, Take 72 mcg by mouth., Disp: , Rfl:     metoprolol succinate (TOPROL-XL) 50 MG 24 hr tablet, Take 50 mg by mouth 3 (three) times daily., Disp: , Rfl:     morphine (MS CONTIN) 15 MG 12 hr tablet, Take 15 mg by mouth 2 (two) times daily. , Disp: , Rfl:     nicotine (NICODERM CQ) 21 mg/24 hr, Place 1 patch onto the skin once daily., Disp: 28 patch, Rfl: 0    nicotine polacrilex 2 MG Lozg, Take by mouth., Disp: , Rfl:     nitroGLYCERIN (NITROSTAT) 0.4 MG SL tablet, , Disp: , Rfl:     QUEtiapine (SEROQUEL) 200 MG Tab, Take 200 mg by mouth every evening. , Disp: , Rfl: 0    rosuvastatin (CRESTOR) 20 MG tablet, Take 20 mg by mouth every evening. , Disp: , Rfl:     sumatriptan (IMITREX) 25 MG Tab, Take 25 mg by mouth daily as needed. , Disp: , Rfl:     temazepam (RESTORIL) 15 mg Cap, Take 30 mg by mouth every evening., Disp: , Rfl:     triamcinolone acetonide 0.1% (KENALOG) 0.1 % cream, Apply topically 2 (two) times daily., Disp: 80 g, Rfl: 1    varenicline (CHANTIX) 1 mg Tab, Take 1 tablet (1 mg total) by mouth 2 (two) times daily., Disp: 60 tablet, Rfl: 0    VRAYLAR 1.5 mg Cap, Take 1.5 mg by mouth., Disp: , Rfl:      OB History          7    Para   5    Term           5    AB   2    Living   5         SAB        IAB        Ectopic        Multiple        Live Births   5           Obstetric Comments    Pre term vaginal delivery x5 with EAB x2               Age of Menarche:16  Age at first pregnancy:17   Age at first live birth:22  Number of months breastfeeding:    Age at Menopause:    Comments:       Social History     Tobacco Use    Smoking status: Every Day     Current packs/day: 0.50     Types: Cigarettes, Vaping with nicotine    Smokeless tobacco: Never    Tobacco comments:     Smokes 1 to 1 1/2 packs daily   Substance Use Topics    Alcohol use: Not Currently    Drug use: No  "      Family History   Problem Relation Name Age of Onset    Heart disease Mother      Diabetes Mother      Heart disease Father         Past medical and surgical history reviewed.   I have reviewed the patient's medical history in detail and updated the computerized patient record.    Review of Systems (at today's evaluation)  Review of Systems   Constitutional:  Negative for fever and unexpected weight change.   HENT:  Negative for congestion, ear pain, nosebleeds, sinus pain and sore throat.    Eyes: Negative.    Respiratory:  Negative for cough and shortness of breath.    Cardiovascular:  Negative for chest pain and palpitations.   Gastrointestinal:  Negative for constipation, diarrhea, nausea and vomiting.   Endocrine: Negative.    Genitourinary:  Negative for dysuria, frequency, hematuria and urgency.        Gyn as per HPI   Musculoskeletal:  Negative for arthralgias and myalgias.   Skin:  Negative for rash.   Neurological:  Negative for weakness and headaches.   Psychiatric/Behavioral:  The patient is not nervous/anxious.         Physical Exam:   /86 (BP Location: Right arm, Patient Position: Sitting)   Ht 5' 8" (1.727 m)   Wt 87.3 kg (192 lb 7.4 oz)   LMP 03/29/2024 (Exact Date)   BMI 29.26 kg/m²     Physical Exam:   Constitutional: She appears well-developed and well-nourished.    HENT:   Head: Normocephalic.     Neck: No thyroid mass present.    Cardiovascular:  Normal rate.             Pulmonary/Chest: Effort normal. Right breast exhibits no mass, no nipple discharge and no skin change. Left breast exhibits no mass, no nipple discharge and no skin change.        Abdominal: Soft. There is no abdominal tenderness.     Genitourinary:    Inguinal canal, vagina, uterus, right adnexa and left adnexa normal.      Pelvic exam was performed with patient supine.   The external female genitalia was normal.   No external genitalia lesions identified,Cervix is normal. Right adnexum displays no mass and no " tenderness. Left adnexum displays no mass and no tenderness. No tenderness or bleeding in the vagina. Uterus is not tender. Normal urethral meatus.Urethra findings: no tendernessBladder findings: no bladder tenderness   Genitourinary Comments: Chaperone (female medical assistant) present throughout physical exam.             Musculoskeletal:      Right lower leg: No edema.      Left lower leg: No edema.      Lymphadenopathy:     She has no cervical adenopathy. No inguinal adenopathy noted on the right or left side.    Neurological: She is alert.   No gross defects noted    Skin: Skin is warm and dry.    Psychiatric: Mood normal.        Assessment:        1. Well woman exam with routine gynecological exam    2. Screening for malignant neoplasm of cervix    3. Encounter for screening mammogram for malignant neoplasm of breast    4. Perimenopausal    5. Thyroid disease         Plan:      Well woman exam with routine gynecological exam    Screening for malignant neoplasm of cervix  -     HPV High Risk Genotypes, PCR  -     Liquid-Based Pap Smear, Screening    Encounter for screening mammogram for malignant neoplasm of breast  -     Cancel: Mammo Digital Screening Bilat w/ Maged; Future; Expected date: 10/23/2024  -     Cancel: US Breast Bilateral Limited; Future; Expected date: 10/23/2024  -     Mammo Digital Screening Bilat w/ Maged; Future; Expected date: 10/23/2024  -     US Breast Bilateral Limited; Future; Expected date: 10/23/2024    Perimenopausal  -     Luteinizing Hormone; Future; Expected date: 10/23/2024  -     Follicle Stimulating Hormone; Future; Expected date: 10/23/2024  -     Testosterone Panel; Future; Expected date: 10/23/2024  -     Estradiol; Future; Expected date: 10/23/2024    Thyroid disease  -     T3; Future; Expected date: 10/23/2024  -     Thyroid Peroxidase Antibody; Future; Expected date: 10/23/2024  -     T4, Free; Future; Expected date: 10/23/2024  -     TSH; Future; Expected date:  10/23/2024       Follow up for as needed / for any GYN related issues.     The above was reviewed and discussed with the patient.    Annual exam and screening issues based on the patient's age, medical history and family history were reviewed / discussed.  Routine health maintenance issues were reviewed and discussed.      Pap smear was obtained and prescription for mammogram as well as breast ultrasound faxed to [facility].  The patient reports a history of dense breasts on mammogram and always requires follow up ultrasound.  We discussed the patient's naomi mental partial status.  Options reviewed and the patient would like a general evaluation of her hormonal levels at this time.    From a gynecologic standpoint the patient is currently doing well without complaints.    The patient's questions were answered, and she is in agreement with the current plan.     Ron Alves MD  Department OBGYN  [facility]

## 2024-10-24 ENCOUNTER — LAB VISIT (OUTPATIENT)
Dept: LAB | Facility: HOSPITAL | Age: 54
End: 2024-10-24
Attending: OBSTETRICS & GYNECOLOGY
Payer: MEDICAID

## 2024-10-24 ENCOUNTER — CLINICAL SUPPORT (OUTPATIENT)
Dept: SMOKING CESSATION | Facility: CLINIC | Age: 54
End: 2024-10-24
Payer: COMMERCIAL

## 2024-10-24 DIAGNOSIS — N95.1 PERIMENOPAUSAL: ICD-10-CM

## 2024-10-24 DIAGNOSIS — F17.210 HEAVY CIGARETTE SMOKER (20-39 PER DAY): Primary | ICD-10-CM

## 2024-10-24 LAB
ESTRADIOL SERPL-MCNC: 62 PG/ML
FSH SERPL-ACNC: 23.82 MIU/ML
LH SERPL-ACNC: 15.3 MIU/ML

## 2024-10-24 PROCEDURE — 82040 ASSAY OF SERUM ALBUMIN: CPT | Performed by: OBSTETRICS & GYNECOLOGY

## 2024-10-24 PROCEDURE — 83001 ASSAY OF GONADOTROPIN (FSH): CPT | Performed by: OBSTETRICS & GYNECOLOGY

## 2024-10-24 PROCEDURE — 99999 PR PBB SHADOW E&M-EST. PATIENT-LVL II: CPT | Mod: PBBFAC,,,

## 2024-10-24 PROCEDURE — 84270 ASSAY OF SEX HORMONE GLOBUL: CPT | Performed by: OBSTETRICS & GYNECOLOGY

## 2024-10-24 PROCEDURE — 84403 ASSAY OF TOTAL TESTOSTERONE: CPT | Performed by: OBSTETRICS & GYNECOLOGY

## 2024-10-24 PROCEDURE — 82670 ASSAY OF TOTAL ESTRADIOL: CPT | Performed by: OBSTETRICS & GYNECOLOGY

## 2024-10-24 PROCEDURE — 99404 PREV MED CNSL INDIV APPRX 60: CPT | Mod: S$GLB,,,

## 2024-10-24 PROCEDURE — 83002 ASSAY OF GONADOTROPIN (LH): CPT | Performed by: OBSTETRICS & GYNECOLOGY

## 2024-10-24 NOTE — PROGRESS NOTES
"Individual Follow-Up Form    10/24/2024      Clinical Status of Patient: Outpatient    Length of Service: 60 minutes    Continuing Medication: yes  Chantix    Other Medications: nicotine patch      Target Symptoms: Withdrawal and medication side effects. The following were  rated moderate (3) to severe (4) on TCRS:  Moderate (3): none  Severe (4): none    Comments: Patient is smoking 20cpd.  Patient admits she was smoking in her house this week and did not wear the patch.  Patient reports having a lot going on this week and she "did not care" about how much she smoked.  Patient states this week she just feels she does not want to quit smoking.  Patient feels she is not ready to resume attempt.  We discussed ambivalence and motivations to quit.  Patient continue to use of 1 mg Chantix BID.   Patient continue to use 21 mg nicotine patch daily without any negative side effects at this time.  Patient reports not using nicotine patch consistantly.   We discussed and reviewed: triggers, managing stress & anxiety, support system, continued momentum, setting attainable goals, mindset & motivation, treatment plan and accountability. The patient denies any abnormal behavioral or mental changes at this time. The patient will continue with  therapy sessions and medication monitoring by CTTS. Prescribed medication management will be by physician.      Diagnosis: F17.210    Next Visit: 2 weeks    "

## 2024-10-31 ENCOUNTER — CLINICAL SUPPORT (OUTPATIENT)
Dept: SMOKING CESSATION | Facility: CLINIC | Age: 54
End: 2024-10-31
Payer: COMMERCIAL

## 2024-10-31 DIAGNOSIS — F17.210 MODERATE CIGARETTE SMOKER (10-19 PER DAY): Primary | ICD-10-CM

## 2024-10-31 PROCEDURE — 99404 PREV MED CNSL INDIV APPRX 60: CPT | Mod: S$GLB,,,

## 2024-10-31 PROCEDURE — 99999 PR PBB SHADOW E&M-EST. PATIENT-LVL II: CPT | Mod: PBBFAC,,,

## 2024-11-01 LAB
ALBUMIN SERPL-MCNC: 4 G/DL (ref 3.6–5.1)
SHBG SERPL-SCNC: 64 NMOL/L (ref 17–124)

## 2024-11-04 ENCOUNTER — PATIENT MESSAGE (OUTPATIENT)
Dept: OBSTETRICS AND GYNECOLOGY | Facility: CLINIC | Age: 54
End: 2024-11-04
Payer: MEDICAID

## 2024-11-14 ENCOUNTER — CLINICAL SUPPORT (OUTPATIENT)
Dept: SMOKING CESSATION | Facility: CLINIC | Age: 54
End: 2024-11-14
Payer: COMMERCIAL

## 2024-11-14 DIAGNOSIS — F17.210 MODERATE CIGARETTE SMOKER (10-19 PER DAY): Primary | ICD-10-CM

## 2024-11-14 PROCEDURE — 99404 PREV MED CNSL INDIV APPRX 60: CPT | Mod: S$GLB,,,

## 2024-11-14 PROCEDURE — 99999 PR PBB SHADOW E&M-EST. PATIENT-LVL II: CPT | Mod: PBBFAC,,,

## 2024-11-14 NOTE — Clinical Note
Patient is smoking 15 cpd or less.  Patient reports use of 21 mg nicotine patch daily most days without any negative side effects at this time.  Patient reports smoking 12 cpd on days she use the patch. Patient reports keeping busy which has also helped with reduction.  Patient is no longer using Chantix and she is unsure if she is ready to completely quit at this and does not feel it is working.  Patient admits to not trying hard to reduce. We discussed and reviewed: triggers, managing stress & anxiety, support system, continued momentum, setting attainable goals, mindset & motivation, treatment plan and accountability.  The patient denies any abnormal behavioral or mental changes at this time. The patient will continue with  therapy sessions and medication monitoring by CTTS. Prescribed medication management will be by physician.

## 2024-11-14 NOTE — PROGRESS NOTES
Individual Follow-Up Form    11/14/2024      Clinical Status of Patient: Outpatient    Length of Service: 60 minutes    Continuing Medication: yes  Patches    Other Medications: none     Target Symptoms: Withdrawal and medication side effects. The following were  rated moderate (3) to severe (4) on TCRS:  Moderate (3): none  Severe (4): none    Comments: Patient is smoking 15 cpd or less.  Patient reports use of 21 mg nicotine patch daily most days without any negative side effects at this time.  Patient reports smoking 12 cpd on days she use the patch. Patient reports keeping busy which has also helped with reduction.  Patient is no longer using Chantix and she is unsure if she is ready to completely quit at this and does not feel it is working.  Patient admits to not trying hard to reduce. We discussed and reviewed: triggers, managing stress & anxiety, support system, continued momentum, setting attainable goals, mindset & motivation, treatment plan and accountability.  The patient denies any abnormal behavioral or mental changes at this time. The patient will continue with  therapy sessions and medication monitoring by CTTS. Prescribed medication management will be by physician.      Diagnosis: F17.210    Next Visit: 2 weeks

## 2024-11-22 ENCOUNTER — TELEPHONE (OUTPATIENT)
Dept: ALLERGY | Facility: CLINIC | Age: 54
End: 2024-11-22
Payer: MEDICAID

## 2024-12-03 ENCOUNTER — CLINICAL SUPPORT (OUTPATIENT)
Dept: SMOKING CESSATION | Facility: CLINIC | Age: 54
End: 2024-12-03
Payer: COMMERCIAL

## 2024-12-03 DIAGNOSIS — F17.210 MODERATE SMOKER (20 OR LESS PER DAY): ICD-10-CM

## 2024-12-03 PROCEDURE — 99999 PR PBB SHADOW E&M-EST. PATIENT-LVL II: CPT | Mod: PBBFAC,,,

## 2024-12-03 PROCEDURE — 99404 PREV MED CNSL INDIV APPRX 60: CPT | Mod: S$GLB,,,

## 2024-12-03 RX ORDER — IBUPROFEN 200 MG
1 TABLET ORAL DAILY
Qty: 28 PATCH | Refills: 0 | Status: SHIPPED | OUTPATIENT
Start: 2024-12-03

## 2024-12-03 NOTE — PROGRESS NOTES
Individual Follow-Up Form    12/3/2024      Clinical Status of Patient: Outpatient    Length of Service: 60 minutes    Continuing Medication: yes  Patches    Other Medications: none     Target Symptoms: Withdrawal and medication side effects. The following were  rated moderate (3) to severe (4) on TCRS:  Moderate (3): none  Severe (4): none    Comments: Patient reports smoking 5 cpd for about 2 weeks before returning to [facility] on yesterday. Patient set quit date for 1/1/25.   Patient reports wearing nicotine patches on some days but did have one in place on yesterday. Patient is no longer using Chantix,  Patient states she smoked less when she doesn't want to and smoke when she does.  Patient has ambivalence about quitting.  We reviewed motivations to quit.  We discussed the importance of behavior change.  We discussed the importance of setting a timeline to quit.   The patient denies any abnormal behavioral or mental changes at this time. The patient will continue with  therapy sessions and medication monitoring by CTTS. Prescribed medication management will be by physician.      Diagnosis: F17.210    Next Visit: 2 weeks

## 2024-12-04 ENCOUNTER — CLINICAL SUPPORT (OUTPATIENT)
Dept: SMOKING CESSATION | Facility: CLINIC | Age: 54
End: 2024-12-04
Payer: COMMERCIAL

## 2024-12-04 DIAGNOSIS — F17.200 NICOTINE DEPENDENCE: Primary | ICD-10-CM

## 2024-12-04 PROCEDURE — 99407 BEHAV CHNG SMOKING > 10 MIN: CPT | Mod: S$GLB,,, | Performed by: GENERAL PRACTICE

## 2024-12-04 PROCEDURE — 99999 PR PBB SHADOW E&M-EST. PATIENT-LVL I: CPT | Mod: PBBFAC,,,

## 2024-12-04 NOTE — PROGRESS NOTES
Spoke with patient today in regard to smoking cessation progress for 6 month telephone follow up. She states that she is not tobacco free at this time. She has reduced her smoking with use of the nicotine patch and lozenges and support from Mariella ANDRE. Patient states she has a quit date set for 1/1/25. Commended patient on her efforts towards quitting and encouraged her to continue towards her goal of quitting. Informed patient of benefit period, future follow ups, and contact information if any further help or support is needed. Will complete smart form for 6 month follow up for Quit attempt #1.

## 2024-12-18 ENCOUNTER — CLINICAL SUPPORT (OUTPATIENT)
Dept: SMOKING CESSATION | Facility: CLINIC | Age: 54
End: 2024-12-18
Payer: COMMERCIAL

## 2024-12-18 DIAGNOSIS — F17.210 MODERATE CIGARETTE SMOKER (10-19 PER DAY): Primary | ICD-10-CM

## 2024-12-18 PROCEDURE — 99999 PR PBB SHADOW E&M-EST. PATIENT-LVL II: CPT | Mod: PBBFAC,,,

## 2024-12-18 PROCEDURE — 99404 PREV MED CNSL INDIV APPRX 60: CPT | Mod: S$GLB,,,

## 2024-12-18 NOTE — Clinical Note
Patient is smoking 1 ppd.  Patient admits to not being focused on quitting at this time.  Patient continue to use 21 mg nicotine patch daily without any negative side effects at this time.  We discussed and reviewed: triggers, managing stress & anxiety, support system, continued momentum, setting attainable goals, mindset & motivation, treatment plan and accountability.  Patient decided she will step back from this quit attempt and resume sometime next year when she is better able to focus.  The patient denies any abnormal behavioral or mental changes at this time. The patient will continue with  therapy sessions and medication monitoring by CTTS. Prescribed medication management will be by physician.

## 2024-12-19 NOTE — PROGRESS NOTES
Individual Follow-Up Form    12/19/2024    Clinical Status of Patient: Outpatient    Length of Service: 60 minutes    Continuing Medication: yes  Patches    Other Medications: none     Target Symptoms: Withdrawal and medication side effects. The following were  rated moderate (3) to severe (4) on TCRS:  Moderate (3): none  Severe (4): none    Comments: Patient is smoking 1 ppd.  Patient admits to not being focused on quitting at this time.  Patient continue to use 21 mg nicotine patch daily without any negative side effects at this time.  We discussed and reviewed: triggers, managing stress & anxiety, support system, continued momentum, setting attainable goals, mindset & motivation, treatment plan and accountability.  Patient decided she will step back from this quit attempt and resume sometime next year when she is better able to focus.  The patient denies any abnormal behavioral or mental changes at this time. The patient will continue with  therapy sessions and medication monitoring by CTTS. Prescribed medication management will be by physician.      Diagnosis: F17.210    Next Visit: 2 weeks

## 2025-01-24 NOTE — PROGRESS NOTES
The patient location is: [place], [place]  The chief complaint leading to consultation is: follow up  Visit type: audiovisual     Face to Face time with patient: 10 minutes  20 minutes of total time spent on the encounter, which includes face to face time and non-face to face time preparing to see the patient (eg, review of tests), Obtaining and/or reviewing separately obtained history, Documenting clinical information in the electronic or other health record, Independently interpreting results (not separately reported) and communicating results to the patient/family/caregiver, or Care coordination (not separately reported).      Each patient to whom he or she provides medical services by telemedicine is:  (1) informed of the relationship between the physician and patient and the respective role of any other health care provider with respect to management of the patient; and (2) notified that he or she may decline to receive medical services by telemedicine and may withdraw from such care at any time.    ALLERGY & [facility]MM[facility]OLOGY CLINIC -  Established Virtual Visit     HISTORY OF PRESENT ILLNESS     Patient ID: Araceli Ibarra is a 54 y.o. female    CC: follow up    HPI: Araceli Ibarra is a 54 y.o. female presents for evaluation of:    01/27/2025  Here for 6 month follow up for SPAD. Administering Hizentra 16grams weekly (744mg/kg/month) and denies recent infections, bacterial and viral. No courses of antibiotics. Denies sinus, ear and lung infections. Evaluated by endocrine for diaphoresis but no definitive causation found. Intermittent episodes of shortness of breath which is followed by Pulmonology.     07/29/2024  SPAD: Currently on Hizentra 16grams weekly (744mg/kg/month). States she developed mild URI May 2024 along with multiple family members and has continued to have excess mucus. Denies antibiotics, fevers, allergies. 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 Main complaint is sweating episodes even at rest. Feels like she has sweating episodes on a daily basis throughout the day. Unsure if related to starting Hizentra or increased dosages back in 2019. Denies sinus infections, pneumonia, and ear infections. No ER visits and denies hospitalizations. Denies fevers and unexplained weight loss.   Continue Hizentra 16grams weekly (~735mg/kg/month)  Consider re-checking IgM and IgA at future visits  Consider baseline spirometry  Unclear etiology of sweating, would not discontinie IG replacement     REVIEW OF SYSTEMS     CONST: no F/C/NS, no unintentional weight changes  Balance of review of systems negative except as mentioned above     MEDICAL HISTORY     MedHx: active problems reviewed  SurgHx:   Past Surgical History:   Procedure Laterality Date    CERVICAL CONIZATION   W/ LASER      INTRAVENOUS INFUSION      LEFT HEART CATHETERIZATION Left 6/9/2020    Procedure: CATHETERIZATION, HEART, LEFT;  Surgeon: Edis Peñaloza MD;  Location: [facility] CATH/EP LAB;  Service: Cardiology;  Laterality: Left;    PANCREAS SURGERY      Partial removal r/t cyst.    SKIN GRAFT Right     Arm    SPLENECTOMY, PARTIAL      TONSILLECTOMY      TUBAL LIGATION       Allergies: see below  Medications: MAR reviewed    Otherwise No interval changes in medical history     PHYSICAL EXAM     Virtual Visit-Patient Appears well and in no distress during evaluation.     LABORATORY STUDIES     Component      Latest Ref Rng 5/18/2020 8/8/2024   Immunoglobulin G      586 - 1,602 mg/dL 1,047  1,148         ASSESSMENT/PLAN     Araceli Ibarra is a 54 y.o. female with       1. IgG deficiency    2. Hypogammaglobulinemia    3. Deficiency of anti-pneumococcal polysaccharide antibody      Well controlled from infectious standpoint at current dosage Hizentra 16grams weekly (744mg/kg/month)  Immunoglobulins prior to 6  month follow up in July, tracking IgA and IgM to ensure not developing CVID  Recommend spirometry pending follow up Ig isotypes as CVID is associated with restrictive lung disease     Follow up: 6 months      Walt Fortune MD    I spent a total of 20 minutes on the day of the visit. This includes face to face time and non-face to face time preparing to see the patient (eg, review of tests), obtaining and/or reviewing separately obtained history, documenting clinical information in the electronic or other health record, independently interpreting results and communicating results to the patient/family/caregiver, or care coordinator.

## 2025-01-27 ENCOUNTER — TELEPHONE (OUTPATIENT)
Dept: ALLERGY | Facility: CLINIC | Age: 55
End: 2025-01-27
Payer: MEDICAID

## 2025-01-27 ENCOUNTER — OFFICE VISIT (OUTPATIENT)
Dept: ALLERGY | Facility: CLINIC | Age: 55
End: 2025-01-27
Payer: MEDICAID

## 2025-01-27 DIAGNOSIS — D80.6 DEFICIENCY OF ANTI-PNEUMOCOCCAL POLYSACCHARIDE ANTIBODY: ICD-10-CM

## 2025-01-27 DIAGNOSIS — D80.1 HYPOGAMMAGLOBULINEMIA: ICD-10-CM

## 2025-01-27 DIAGNOSIS — D80.3 IGG DEFICIENCY: Primary | ICD-10-CM

## 2025-01-27 NOTE — TELEPHONE ENCOUNTER
Spoke to pt who had by this time signed in for her appt      ----- Message from Argelia sent at 1/27/2025  8:04 AM CST -----  Contact: self  Type: Needs Medical Advice  Who Called:  pt  Best Call Back Number: [phone]   Additional Information: pt has an 8am appt but does not know how to set up virtual appt.please call

## 2025-02-20 ENCOUNTER — TELEPHONE (OUTPATIENT)
Dept: CARDIOLOGY | Facility: CLINIC | Age: 55
End: 2025-02-20
Payer: MEDICAID

## 2025-02-20 NOTE — TELEPHONE ENCOUNTER
----- Message from Faith sent at 2/20/2025  1:30 PM CST -----  Type:  Sooner Apoointment RequestCaller is requesting a sooner appointment.   Name of Caller:ptWhen is the first available appointment?dept. bookedSymptoms:chest painWould the patient rather a call back or a response via [facility]? Call [facility] Call Back Number:[phone] Additional Information: pt st she needs to be seen. Please call to discuss.

## 2025-04-30 RX ORDER — POLYETHYLENE GLYCOL 3350 17 G/17G
POWDER, FOR SOLUTION ORAL
COMMUNITY
Start: 2024-11-06

## 2025-04-30 RX ORDER — METOPROLOL TARTRATE 50 MG/1
50 TABLET ORAL 3 TIMES DAILY
COMMUNITY
Start: 2025-04-22

## 2025-04-30 RX ORDER — GABAPENTIN 800 MG/1
800 TABLET ORAL
COMMUNITY

## 2025-04-30 RX ORDER — PROMETHAZINE HYDROCHLORIDE 25 MG/1
25 TABLET ORAL
COMMUNITY
Start: 2024-11-06

## 2025-04-30 RX ORDER — BISACODYL 5 MG
TABLET, DELAYED RELEASE (ENTERIC COATED) ORAL
COMMUNITY
Start: 2024-11-06

## 2025-04-30 RX ORDER — PANCRELIPASE 36000; 180000; 114000 [USP'U]/1; [USP'U]/1; [USP'U]/1
CAPSULE, DELAYED RELEASE PELLETS ORAL
COMMUNITY

## 2025-05-01 ENCOUNTER — OFFICE VISIT (OUTPATIENT)
Dept: PULMONOLOGY | Facility: CLINIC | Age: 55
End: 2025-05-01
Payer: MEDICAID

## 2025-05-01 VITALS — OXYGEN SATURATION: 94 % | WEIGHT: 191 LBS | BODY MASS INDEX: 29.04 KG/M2 | HEART RATE: 75 BPM

## 2025-05-01 DIAGNOSIS — J18.9 RECURRENT PNEUMONIA: ICD-10-CM

## 2025-05-01 DIAGNOSIS — Z87.891 PERSONAL HISTORY OF TOBACCO USE, PRESENTING HAZARDS TO HEALTH: ICD-10-CM

## 2025-05-01 DIAGNOSIS — J43.8 OTHER EMPHYSEMA: Primary | ICD-10-CM

## 2025-05-01 RX ORDER — DONEPEZIL HYDROCHLORIDE 10 MG/1
10 TABLET, FILM COATED ORAL
COMMUNITY
Start: 2025-04-30

## 2025-05-01 NOTE — PROGRESS NOTES
"    Subjective:       Patient ID: Araceli Ibarra is a 54 y.o. female.    Chief Complaint: Follow-up (Follow up emphysema )      5/17/2017 - Here for follow up, overall doing much better with ANORO.  Did get some chest wall injury at a fair and has some chest discomfort as a result.  She continues to work on stopping smoking. No other new complaints    1/21/2017 - Here for follow up, still smoking (lots of smokers in the house).  She is currently using cigarettes and a VAPE.  Has dyspnea (primarily with exertion), has daily cough (mucus currently brown - her usual).  She does feel that her breathing is worse over the last month or so.  Taking medications regularly.  Working on stopping smoking.    5/22/2018 - Here for follow up has been on TRELEGY for 4-6 months, still with problems with cough, congestion which has not been much better.  Still smoking (d/w her)., still using VAPE.  Mother has had similar problems.  Has had elevated WBC and is going to see hematologist.  Denies much nasal congestion, sputum is described as a "caramel" color.  No recent CXR.    7/31/2018 - Here for follow up, feels ok for now, has chronic cough but not worse.  Has chronic dyspnea (not worse).  Still smoking about 1/2 PPD.  Reviewed CXR, CT  And sinus films with pt.  No other new issues.    10/30/2018 - Here for follow up, has been getting immunoglobulin infusions.  Smoking about 6 cig/day.  Overall doing ok, no recent pneumonia, no recent ER visits or hospitalizations.  Sputum is still described as brown but not as much as usual.  Has also been approved for DALIRESP.and started about 2 weeks ago.    2/5/2019 - Here for follow up, still with daily mucus (white to a little green).  Breathing has been OK.  Usually sinuses are not as much of a problem.  No reflux or heartburn.  Has been on daliresp for about 4 months and on TRELEGY.  Still smoking (was as high as 1 PPD but back to < 1/2 PPD).  Getting IVIG and hasn't had pneumonia since.  " "No hemoptysis.    5/7/2019 - Here for follow up, overall stable on present meds (TRELEGY and DALIRESP - she has been on numerous other regimens and this has been the best at controlling her symptoms).  Pt is currently stable on present medications with no recent increases in their symptoms or use of rescue medications.  I have reviewed the medical regimen and re-educated the pt on the role of rescue and controlling medications.  All questions answered.  Inhaler technique seems adequate.  Still getting immunoglobulin infusions (dose has been increased recently).  Still smoking anywhere from (1/4 - 3/4 PPD) - lives in house with multiple smokers.  CT scan showed COPD but no acute changes.  Sputum culture and AFB were negative.     11/7/2019 - Had URI and temp to 104, CXR was OK, treated with antibiotics and feels better.  She does feel that she "let it go too far".  Has had issues with exposures to things, AC went out, still with a lot of stress.  Still smoking about < 1/2 PPD but has second hand exposure as well.  Getting infusions from Dr Vasquez.  Does not vape at this time.  Had a fall at [place] and had some back injury which has slowed her down.    5/7/2020 - Virtual Visit    The chief complaint leading to consultation is: follow up  The patient location is:  in car  Visit type: Virtual visit with synchronous audio and video    There was a delay in getting some of her hizentra infusion and she felt that she was having more trouble but since restarting she feels that she is better (reports that dose has been increased).  Still smoking about 7-8 cigarettes per day.  Not vaping at this time.    I have discussed the limitations of a virtual visit with the patient including the fact that there are no vital signs and no way to fully examine the patient.  This could lead to misdiagnosis and possibly to delays in appropriate care.    The physical exam in this note is pulled forward from prior visits.  It will be " updated based on any findings which I can discern based on seeing the patient on a video screen.  I will not eliminate findings from prior exams at this visit.    11/11/2020 - Here for follow up, has been getting regular hizentra (weekly) infusions and has some continued chronic cough with some brownish sputum but does feel better overall with the infusions.  Still smoking but mostly less than 1/2 PPD.  Last CXR 6/2020 was OK.  Patient has no known corona virus exposures and has been practicing social distancing.  We have discussed the virus and precautions and all questions have been answered.    5/12/2021 - Here for follow up, coughing has been about the same to better.  Still continuing with Hizentra infusions.  Still smoking but has had some periods without smoking but her home situation is stressful and there are smokers.  Patient has no known corona virus exposures and has been practicing social distancing.  We have discussed the virus and precautions and all questions have been answered.  Got Covid vaccine (Pfizer).    11/10/2021 - Here for follow up,  Has not been on TRELEGY in a while.  Since last visit was sick one time and has resolved.  She is still smoking and is up and down with it.  Still with a lot of stress at home.  She wants to try and see how she does without a controlling medication since she has not been using one.  Still has some anxiety.  Patient has no known corona virus exposures and has been practicing social distancing.  We have discussed the virus and precautions and all questions have been answered.  She has had Covid vaccine (Pfizer and more recently had 2 doses of Moderna)    5/10/2022 - Here for follow up,  Pt is currently stable on present medications with no recent increases in their symptoms or use of rescue medications.  Since our last visit there have been no hospitalizations or ER visits for their respiratory issues and there does not seem to be anything to suggest unrecognized  exacerbations.  I have reviewed the medical regimen and re-educated the pt on the role of rescue and controlling medications.  Inhaler technique and understanding seems adequate.  The patient reports no issues with any of there medications for their COPD.  Refills will be taken care of as needed.  All questions answered.  Patient has no known corona virus exposures and has been practicing social distancing.  We have discussed the virus and precautions and all questions have been answered.    11/10/2022 - Here for follow up, under a lot of stress with her mother (now in hospital). Breathing not as good because of her stress.  No other new issues.   HAs increased smoking to > 1 PPD (we discussed this).    5/11/2023 - Here for follow up, she tells me that she was exposed to TB in the past and had a + PPD and was treated for 6 months.   Pt is currently stable on present medications with no recent increases in their symptoms or use of rescue medications.  Since our last visit there have been no hospitalizations or ER visits for their respiratory issues and there does not seem to be anything to suggest unrecognized exacerbations.  I have reviewed the medical regimen and re-educated the pt on the role of rescue and controlling medications.  Inhaler technique and understanding seems adequate.  The patient reports no issues with any of there medications for their COPD.  Refills will be taken care of as needed.  All questions answered.  She is planning to get repeat bivalent Covid vaccine.  She needs refills of some of her heart meds.  She is still smoking about the same.    11/7/2023 - Here for follow up, Pt is currently stable on present medications with no recent increases in their symptoms or use of rescue medications.  Since our last visit there have been no hospitalizations or ER visits for their respiratory issues and there does not seem to be anything to suggest unrecognized exacerbations.  I have reviewed the medical  regimen and re-educated the pt on the role of rescue and controlling medications.  Inhaler technique and understanding seems adequate.  The patient reports no issues with any of there medications for their COPD.  Refills will be taken care of as needed.  All questions answered.  Continues with daily cough and congestion but is still smoking 1-1.5 PPD - we discussed this.  She continues with her infusions and feels that it helps with her recurrent infections.  She is not vaping at all now.  She has been on TRELEGY in the past and she did not notice any significant change in her symptoms.  We could not get insurance to cover DALIRESP in the past.    5/7/2024 - Here for follow up, Pt is currently stable on present medications with no recent increases in their symptoms or use of rescue medications.  Since our last visit there have been no hospitalizations or ER visits for their respiratory issues and there does not seem to be anything to suggest unrecognized exacerbations.  I have reviewed the medical regimen and re-educated the pt on the role of rescue and controlling medications.  Inhaler technique and understanding seems adequate.  The patient reports no issues with any of there medications for their COPD.  Refills will be taken care of as needed.  All questions answered.  She is smoking more because of stress in her family (son is being bullied).  We discussed this.  She is very concerned about the possibility of getting Covid and we discussed this.  She does feel down and depressed.  We discussed the need to get additional psychiatric and psychological help.      9/18/2024 - Here for follow up visit.  Patient is currently stable on present medications with no recent increases in their symptoms or use of rescue medications.  Since our last visit there have been no hospitalizations or ER visits for their respiratory issues and there does not seem to be anything to suggest unrecognized exacerbations.  I have reviewed  the medical regimen and re-educated the pt on the role of rescue and controlling medications.  Inhaler technique and understanding seems adequate.  The patient reports no issues with any of there medications for their COPD.  Refills will be taken care of as needed.  All questions answered.  We have discussed potential future therapies or options as appropriate.  Still with her chronic cough and congestion.  She is still smoking about 1 PPD and continues to struggle with this.  Unfortunately other people are smoking in the house.  She is on chantix and so far not seeing much benefit (we discussed this).  She feels she is doing a little better with her depression. She is going to [employer] and working out.     5/1/2025 - Here for follow up visit.  Patient is currently stable on present medications with no recent increases in their symptoms or use of rescue medications.  Since our last visit there have been no hospitalizations or ER visits for their respiratory issues and there does not seem to be anything to suggest unrecognized exacerbations.  I have reviewed the medical regimen and re-educated the pt on the role of rescue and controlling medications.  Inhaler technique and understanding seems adequate.  The patient reports no issues with any of there medications for their COPD.  Refills will be taken care of as needed.  All questions answered.  We have discussed potential future therapies or options as appropriate.  She now has a new primary care and a new cardiologist.  The new primary care MD did not RX her sleep meds or anxiety meds.  She is having issues with the fact that her meds have been being changed by her primary and we discussed this.  We also discussed trying RELAXIUM OTC for insomnia (she has tried melatonin and ashwaganda in the past).        COPD    GOLD A    Last PFT - 5/21  FEV1- 94 % DLCO - 62 %     + LABA/LAMA/ICS/daliresp    + prn ALBUTEROL    mMRC -  0 - SOB with strenuous exercise   - + 1 -  "SOB level ground, slight hill   -  2 - SOB walk slower or stop for breath level ground   -  3 - SOB at 100 yards or after few minutes   -  4 - SOB in house, dressing    Referral to PULMONARY REHABILITATION -   NO    Tested for alpha-1-antitrypsin - NO    Cigarette Counseling    Currently smoking about 1 packs per day  30 pack years    I have counseled pt for 3-5 minutes regarding cigarette cessation.  This has included the need to stop smoking as well as strategies, including but not limited to "cold turkey", CHANTIX (including risks and benefits of kasia drug), nicotine replacement and WELLBUTRIN.      COPD  Associated symptoms include coughing and joint swelling. Pertinent negatives include no abdominal pain, arthralgias, chest pain, chills, congestion, fatigue, fever, headaches, myalgias, nausea, numbness, sore throat or weakness.   Follow-up  Associated symptoms include coughing and joint swelling. Pertinent negatives include no abdominal pain, arthralgias, chest pain, chills, congestion, fatigue, fever, headaches, myalgias, nausea, numbness, sore throat or weakness.     Review of Systems   Constitutional:  Negative for activity change, appetite change, chills, fatigue and fever.   HENT:  Negative for congestion, hearing loss, nosebleeds, postnasal drip, sneezing and sore throat.    Respiratory:  Positive for cough and shortness of breath. Negative for apnea, choking, chest tightness, wheezing and stridor.    Cardiovascular:  Negative for chest pain, palpitations and leg swelling.   Gastrointestinal:  Negative for abdominal distention, abdominal pain, constipation, diarrhea and nausea.   Genitourinary:  Negative for dysuria, frequency and urgency.   Musculoskeletal:  Positive for back pain and joint swelling. Negative for arthralgias and myalgias.   Neurological:  Negative for dizziness, tremors, seizures, syncope, facial asymmetry, speech difficulty, weakness, light-headedness, numbness and headaches. "   Hematological:  Negative for adenopathy.   Psychiatric/Behavioral:  Negative for dysphoric mood. The patient is nervous/anxious.        Objective:       Vitals:    05/01/25 1013   BP: (P) 125/78   BP Location: Left arm   Patient Position: Sitting   Pulse: 75   SpO2: (!) 94%   Weight: 86.6 kg (191 lb)         Physical Exam  Vitals and nursing note reviewed.   Constitutional:       General: She is not in acute distress.     Appearance: She is well-developed. She is not diaphoretic.   HENT:      Head: Normocephalic and atraumatic.      Nose: Nose normal.      Mouth/Throat:      Mouth: Mucous membranes are moist.      Pharynx: Oropharynx is clear. No oropharyngeal exudate.   Eyes:      General: No scleral icterus.        Right eye: No discharge.         Left eye: No discharge.      Extraocular Movements: Extraocular movements intact.      Conjunctiva/sclera: Conjunctivae normal.      Pupils: Pupils are equal, round, and reactive to light.   Neck:      Thyroid: No thyromegaly.      Vascular: No JVD.      Trachea: No tracheal deviation.   Cardiovascular:      Rate and Rhythm: Normal rate and regular rhythm.      Heart sounds: Normal heart sounds. No murmur heard.     No friction rub. No gallop.   Pulmonary:      Effort: Pulmonary effort is normal. No respiratory distress.      Breath sounds: Normal breath sounds. No stridor. No wheezing, rhonchi or rales.   Chest:      Chest wall: No tenderness.   Abdominal:      General: Bowel sounds are normal. There is no distension.      Palpations: Abdomen is soft.      Tenderness: There is no abdominal tenderness. There is no guarding.   Musculoskeletal:         General: No tenderness. Normal range of motion.      Cervical back: Normal range of motion and neck supple. No rigidity.      Right lower leg: No edema.      Left lower leg: No edema.   Lymphadenopathy:      Cervical: No cervical adenopathy.   Skin:     General: Skin is warm and dry.   Neurological:      General: No focal  "deficit present.      Mental Status: She is alert and oriented to person, place, and time. Mental status is at baseline.      Cranial Nerves: No cranial nerve deficit.      Motor: No weakness.   Psychiatric:         Mood and Affect: Mood normal.         Behavior: Behavior normal.         Thought Content: Thought content normal.         Judgment: Judgment normal.         Assessment:       No diagnosis found.    Plan:       Problem List Items Addressed This Visit          Pulmonary    Recurrent pneumonia  - stable at this time      Chronic obstructive pulmonary disease  Continue present medications.  Will refill medications as needed.  Instructed patient to contact us with any issues concerning their medications (cost, reactions, etc.).  Have discussed with patient about inciting conditions which may exacerbate their disease.  We did discuss possible new therapies or de-escalation of therapy (if appropriate).  Asked patient if they were interested in pursuing pulmonary rehabilitation.  All questions answered  RTC 6 months  Patient instructed that they are to call if symptoms change or new issues develop prior to their next visit.           Other    Personal history of tobacco use, presenting hazards to health  Currently smoking 1 packs per day  40 pack years  I have counseled pt for 3-5 minutes regarding cigarette cessation.  This has included the need to stop smoking as well as strategies, including but not limited to "cold turkey", CHANTIX (including risks and benefits of the drug), nicotine replacement and WELLBUTRIN.  She is currently on CHANTIX      Immunoglobulin deficiency  - per Dr Vasquez  - gets weekly hizentra infusions    Depression/anxiety  - as above  - she has seen psychiatry in past for this and may need to revisit this    Insomnia  Has been taken off of her meds by primary  Have recommended trying RELAXIUM OTC to see if she gets any benefit from that             Darrel Page MD                "

## 2025-05-27 ENCOUNTER — TELEPHONE (OUTPATIENT)
Dept: SMOKING CESSATION | Facility: CLINIC | Age: 55
End: 2025-05-27
Payer: MEDICAID

## 2025-05-27 NOTE — TELEPHONE ENCOUNTER
Called patient about 12 month follow up. Left message for patient to call [phone]. Resolved quit episode.

## 2025-06-02 ENCOUNTER — HOSPITAL ENCOUNTER (EMERGENCY)
Facility: HOSPITAL | Age: 55
Discharge: HOME | End: 2025-06-02
Attending: EMERGENCY MEDICINE
Payer: MEDICAID

## 2025-06-02 VITALS
RESPIRATION RATE: 10 BRPM | TEMPERATURE: 98 F | DIASTOLIC BLOOD PRESSURE: 67 MMHG | HEART RATE: 75 BPM | OXYGEN SATURATION: 93 % | BODY MASS INDEX: 29.7 KG/M2 | SYSTOLIC BLOOD PRESSURE: 115 MMHG | WEIGHT: 196 LBS | HEIGHT: 68 IN

## 2025-06-02 DIAGNOSIS — F11.90 OPIATE USE: ICD-10-CM

## 2025-06-02 DIAGNOSIS — I95.9 HYPOTENSION, UNSPECIFIED HYPOTENSION TYPE: ICD-10-CM

## 2025-06-02 DIAGNOSIS — R55 SYNCOPE, UNSPECIFIED SYNCOPE TYPE: Primary | ICD-10-CM

## 2025-06-02 LAB
ABSOLUTE EOSINOPHIL (SMH): 0.72 K/UL
ABSOLUTE MONOCYTE (SMH): 0.58 K/UL (ref 0.3–1)
ABSOLUTE NEUTROPHIL COUNT (SMH): 5.4 K/UL (ref 1.8–7.7)
ALBUMIN SERPL-MCNC: 3 G/DL (ref 3.5–5.2)
ALP SERPL-CCNC: 64 UNIT/L (ref 55–135)
ALT SERPL-CCNC: 7 UNIT/L (ref 10–44)
AMPHET UR QL SCN: NEGATIVE
ANION GAP (SMH): 3 MMOL/L (ref 8–16)
AST SERPL-CCNC: 17 UNIT/L (ref 10–40)
BARBITURATE SCN PRESENT UR: NEGATIVE
BASOPHILS # BLD AUTO: 0.08 K/UL
BASOPHILS NFR BLD AUTO: 0.8 %
BENZODIAZ UR QL SCN: ABNORMAL
BILIRUB SERPL-MCNC: 0.3 MG/DL (ref 0.1–1)
BILIRUB UR QL STRIP.AUTO: NEGATIVE
BNP SERPL-MCNC: 24 PG/ML
BUN SERPL-MCNC: 14 MG/DL (ref 6–20)
CALCIUM SERPL-MCNC: 7.6 MG/DL (ref 8.7–10.5)
CANNABINOIDS UR QL SCN: NEGATIVE
CHLORIDE SERPL-SCNC: 113 MMOL/L (ref 95–110)
CLARITY UR: CLEAR
CO2 SERPL-SCNC: 22 MMOL/L (ref 23–29)
COCAINE UR QL SCN: NEGATIVE
COLOR UR AUTO: YELLOW
CREAT SERPL-MCNC: 0.8 MG/DL (ref 0.5–1.4)
CREAT UR-MCNC: 90.1 MG/DL (ref 15–325)
ERYTHROCYTE [DISTWIDTH] IN BLOOD BY AUTOMATED COUNT: 14 % (ref 11.5–14.5)
ETHANOL SERPL-MCNC: <10 MG/DL
GFR SERPLBLD CREATININE-BSD FMLA CKD-EPI: >60 ML/MIN/1.73/M2
GLUCOSE SERPL-MCNC: 88 MG/DL (ref 70–110)
GLUCOSE UR QL STRIP: NEGATIVE
HCT VFR BLD AUTO: 36.3 % (ref 37–48.5)
HGB BLD-MCNC: 11.8 GM/DL (ref 12–16)
HGB UR QL STRIP: NEGATIVE
IMM GRANULOCYTES # BLD AUTO: 0.05 K/UL (ref 0–0.04)
IMM GRANULOCYTES NFR BLD AUTO: 0.5 % (ref 0–0.5)
KETONES UR QL STRIP: NEGATIVE
LEUKOCYTE ESTERASE UR QL STRIP: NEGATIVE
LYMPHOCYTES # BLD AUTO: 3.67 K/UL (ref 1–4.8)
MCH RBC QN AUTO: 32.4 PG (ref 27–31)
MCHC RBC AUTO-ENTMCNC: 32.5 G/DL (ref 32–36)
MCV RBC AUTO: 100 FL (ref 82–98)
NITRITE UR QL STRIP: NEGATIVE
NUCLEATED RBC (/100WBC) (SMH): 0 /100 WBC
OPIATES UR QL SCN: ABNORMAL
PCP UR QL: NEGATIVE
PH UR STRIP: 6 [PH]
PLATELET # BLD AUTO: 317 K/UL (ref 150–450)
PMV BLD AUTO: 10 FL (ref 9.2–12.9)
POTASSIUM SERPL-SCNC: 4 MMOL/L (ref 3.5–5.1)
PROT SERPL-MCNC: 5.8 GM/DL (ref 6–8.4)
PROT UR QL STRIP: NEGATIVE
RBC # BLD AUTO: 3.64 M/UL (ref 4–5.4)
RELATIVE EOSINOPHIL (SMH): 6.9 % (ref 0–8)
RELATIVE LYMPHOCYTE (SMH): 35 % (ref 18–48)
RELATIVE MONOCYTE (SMH): 5.5 % (ref 4–15)
RELATIVE NEUTROPHIL (SMH): 51.3 % (ref 38–73)
SODIUM SERPL-SCNC: 138 MMOL/L (ref 136–145)
SP GR UR STRIP: 1.01
TROPONIN HIGH SENSITIVE (SMH): 2.4 PG/ML
UROBILINOGEN UR STRIP-ACNC: NEGATIVE EU/DL
WBC # BLD AUTO: 10.48 K/UL (ref 3.9–12.7)

## 2025-06-02 PROCEDURE — 93010 ELECTROCARDIOGRAM REPORT: CPT | Mod: ,,, | Performed by: INTERNAL MEDICINE

## 2025-06-02 PROCEDURE — 82077 ASSAY SPEC XCP UR&BREATH IA: CPT | Performed by: EMERGENCY MEDICINE

## 2025-06-02 PROCEDURE — 93005 ELECTROCARDIOGRAM TRACING: CPT | Performed by: INTERNAL MEDICINE

## 2025-06-02 PROCEDURE — 84484 ASSAY OF TROPONIN QUANT: CPT | Performed by: EMERGENCY MEDICINE

## 2025-06-02 PROCEDURE — 99285 EMERGENCY DEPT VISIT HI MDM: CPT | Mod: 25

## 2025-06-02 PROCEDURE — 96361 HYDRATE IV INFUSION ADD-ON: CPT

## 2025-06-02 PROCEDURE — 63600175 PHARM REV CODE 636 W HCPCS: Performed by: EMERGENCY MEDICINE

## 2025-06-02 PROCEDURE — 81003 URINALYSIS AUTO W/O SCOPE: CPT | Mod: 59 | Performed by: EMERGENCY MEDICINE

## 2025-06-02 PROCEDURE — 83880 ASSAY OF NATRIURETIC PEPTIDE: CPT | Performed by: EMERGENCY MEDICINE

## 2025-06-02 PROCEDURE — 80307 DRUG TEST PRSMV CHEM ANLYZR: CPT | Performed by: EMERGENCY MEDICINE

## 2025-06-02 PROCEDURE — 85025 COMPLETE CBC W/AUTO DIFF WBC: CPT | Performed by: EMERGENCY MEDICINE

## 2025-06-02 PROCEDURE — 80053 COMPREHEN METABOLIC PANEL: CPT | Performed by: EMERGENCY MEDICINE

## 2025-06-02 PROCEDURE — 96360 HYDRATION IV INFUSION INIT: CPT

## 2025-06-02 RX ADMIN — SODIUM CHLORIDE, POTASSIUM CHLORIDE, SODIUM LACTATE AND CALCIUM CHLORIDE 2667 ML: 600; 310; 30; 20 INJECTION, SOLUTION INTRAVENOUS at 04:06
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
## 2025-06-02 NOTE — DISCHARGE INSTRUCTIONS
Drink lots of fluids.  Rest.  Stop taking your blood pressure medicine as your blood pressure is low.  Follow-up with your primary care provider.  Do not take narcotics or benzodiazepines, that is your pain medicine and anxiety medicine and Drive As they can cause sedation and makes you prone to get into an accident.  Rest.  Follow-up with your primary care provider and your cardiologist.  Return for worsening symptoms or any problems.  Check your blood pressure twice daily and only take your blood pressure medicine if your blood pressure is higher than 140 mmHg systolic.  Please do not drive if you taking narcotics and benzodiazepines as they can be extremely sedating and make your prone to get in accidents

## 2025-06-07 LAB
OHS QRS DURATION: 88 MS
OHS QTC CALCULATION: 414 MS

## 2025-06-20 NOTE — PROGRESS NOTES
The patient location is: [place]  The chief complaint leading to consultation is: follow up    Visit type: audiovisual    Face to Face time with patient: 30  35 minutes of total time spent on the encounter, which includes face to face time and non-face to face time preparing to see the patient (eg, review of tests), Obtaining and/or reviewing separately obtained history, Documenting clinical information in the electronic or other health record, Independently interpreting results (not separately reported) and communicating results to the patient/family/caregiver, or Care coordination (not separately reported).         Each patient to whom he or she provides medical services by telemedicine is:  (1) informed of the relationship between the physician and patient and the respective role of any other health care provider with respect to management of the patient; and (2) notified that he or she may decline to receive medical services by telemedicine and may withdraw from such care at any time.    Notes:     [facility] -  Established Patient     HISTORY OF PRESENT ILLNESS     Patient ID: Araceli Ibarra is a 54 y.o. female    CC: follow up visit    HPI: Araceli Ibarra is a 54 y.o. female presents for follow up.     Office Visit 06/23/2025  Here today for follow up of SPAD with hypogammaglobulinemia. Tolerating Hizentra 16grams weekly (719mg/kg/month). One viral illness that did not require antibiotics one month ago for which she is still with a lingering cough.     01/27/2025  Here for 6 month follow up for SPAD. Administering Hizentra 16grams weekly (744mg/kg/month) and denies recent infections, bacterial and viral. No courses of antibiotics. Denies sinus, ear and lung infections. Evaluated by endocrine for diaphoresis but no definitive causation found. Intermittent episodes of shortness of breath which is followed by Pulmonology.      07/29/2024  SPAD: Currently on Hizentra 16grams weekly  (744mg/kg/month). States she developed mild URI May 2024 along with multiple family members and has continued to have excess mucus. Denies antibiotics, fevers, allergies. 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 Main complaint is sweating episodes even at rest. Feels like she has sweating episodes on a daily basis throughout the day. Unsure if related to starting Hizentra or increased dosages back in 2019. Denies sinus infections, pneumonia, and ear infections. No ER visits and denies hospitalizations. Denies fevers and unexplained weight loss.   Continue Hizentra 16grams weekly (~735mg/kg/month)  Consider re-checking IgM and IgA at future visits  Consider baseline spirometry  Unclear etiology of sweating, would not discontinie IG replacement       REVIEW OF SYSTEMS     CONST: no F/C/NS, no unintentional weight changes  Balance of review of systems negative except as mentioned above     MEDICAL HISTORY     MedHx: active problems reviewed  SurgHx:   Past Surgical History:   Procedure Laterality Date    CERVICAL CONIZATION   W/ LASER      INTRAVENOUS INFUSION      LEFT HEART CATHETERIZATION Left 6/9/2020    Procedure: CATHETERIZATION, HEART, LEFT;  Surgeon: Edis Peñaloza MD;  Location: [facility] CATH/EP LAB;  Service: Cardiology;  Laterality: Left;    PANCREAS SURGERY      Partial removal r/t cyst.    SKIN GRAFT Right     Arm    SPLENECTOMY, PARTIAL      TONSILLECTOMY      TUBAL LIGATION       Allergies: see below  Medications: MAR reviewed    No pertinent allergy changes in medical history since last visit     PHYSICAL EXAM     Virtual-Appears well and no distress     ASSESSMENT/PLAN     Araceli Ibarra is a 54 y.o. female with       1. Hypogammaglobulinemia    2. IgG deficiency    3. Deficiency of anti-pneumococcal polysaccharide antibody      Tolerating  Hizentra 16grams weekly (719mg/kg/month) without issues  Recheck Ig isotypes during next lab draw    Follow up: 6 months      Walt Fortune MD    I spent a total of 35 minutes on the day of the visit. This includes face to face time and non-face to face time preparing to see the patient (eg, review of tests), obtaining and/or reviewing separately obtained history, documenting clinical information in the electronic or other health record, independently interpreting results and communicating results to the patient/family/caregiver, or care coordinator.

## 2025-06-23 ENCOUNTER — OFFICE VISIT (OUTPATIENT)
Dept: ALLERGY | Facility: CLINIC | Age: 55
End: 2025-06-23
Payer: MEDICAID

## 2025-06-23 DIAGNOSIS — D80.1 HYPOGAMMAGLOBULINEMIA: Primary | ICD-10-CM

## 2025-06-23 DIAGNOSIS — D80.6 DEFICIENCY OF ANTI-PNEUMOCOCCAL POLYSACCHARIDE ANTIBODY: ICD-10-CM

## 2025-06-23 DIAGNOSIS — D80.3 IGG DEFICIENCY: ICD-10-CM

## 2025-06-23 PROCEDURE — 98006 SYNCH AUDIO-VIDEO EST MOD 30: CPT | Mod: 95,,, | Performed by: STUDENT IN AN ORGANIZED HEALTH CARE EDUCATION/TRAINING PROGRAM

## 2025-06-23 PROCEDURE — G2211 COMPLEX E/M VISIT ADD ON: HCPCS | Mod: 95,,, | Performed by: STUDENT IN AN ORGANIZED HEALTH CARE EDUCATION/TRAINING PROGRAM

## 2025-06-23 RX ORDER — HUMAN IMMUNOGLOBULIN G 0.2 G/ML
16 LIQUID SUBCUTANEOUS WEEKLY
Qty: 320 ML | Refills: 12 | Status: ACTIVE | OUTPATIENT
Start: 2025-06-23

## 2025-08-05 ENCOUNTER — TELEPHONE (OUTPATIENT)
Dept: PULMONOLOGY | Facility: CLINIC | Age: 55
End: 2025-08-05
Payer: MEDICAID

## 2025-08-05 DIAGNOSIS — R05.9 COUGH, UNSPECIFIED TYPE: Primary | ICD-10-CM

## 2025-08-05 RX ORDER — PREDNISONE 10 MG/1
TABLET ORAL
Qty: 18 TABLET | Refills: 0 | Status: SHIPPED | OUTPATIENT
Start: 2025-08-05

## 2025-08-05 RX ORDER — DOXYCYCLINE 100 MG/1
100 CAPSULE ORAL 2 TIMES DAILY
Qty: 14 CAPSULE | Refills: 0 | Status: SHIPPED | OUTPATIENT
Start: 2025-08-05

## 2025-08-05 NOTE — TELEPHONE ENCOUNTER
Patient called an states that she has been sick since Friday . Her RUL hurts she really feels like she has pneumonia . She would like to get a antibodic kentrell blanchard steriod called into the pharmacy

## 2025-08-13 ENCOUNTER — TELEPHONE (OUTPATIENT)
Dept: PULMONOLOGY | Facility: CLINIC | Age: 55
End: 2025-08-13
Payer: MEDICAID

## 2025-08-13 RX ORDER — ALBUTEROL SULFATE 0.83 MG/ML
2.5 SOLUTION RESPIRATORY (INHALATION) EVERY 6 HOURS PRN
Qty: 50 EACH | Refills: 11 | Status: SHIPPED | OUTPATIENT
Start: 2025-08-13 | End: 2026-08-13

## 2025-08-13 RX ORDER — ALBUTEROL SULFATE 90 UG/1
2 INHALANT RESPIRATORY (INHALATION) EVERY 6 HOURS PRN
Qty: 18 G | Refills: 5 | Status: SHIPPED | OUTPATIENT
Start: 2025-08-13

## 2025-08-14 RX ORDER — BUDESONIDE 0.5 MG/2ML
0.5 INHALANT ORAL DAILY
Qty: 60 ML | Refills: 5 | Status: SHIPPED | OUTPATIENT
Start: 2025-08-14

## (undated) DEVICE — CATHETER DIAGNOSTIC DXTERITY 5F PIGST

## (undated) DEVICE — GUIDEWIRE J TIP EXCHANGE FIXED CORE 260

## (undated) DEVICE — GUIDEWIRE DOUBLE ENDED .035 DIA. 150CML

## (undated) DEVICE — SHEATH INTRODUCER SLENDER 6FX10CM

## (undated) DEVICE — CATHETER RADIAL TIG 4.0 OPTITORQUE 5X110

## (undated) DEVICE — HEMOSTAT VASC BAND REG 24CM